# Patient Record
Sex: FEMALE | Race: NATIVE HAWAIIAN OR OTHER PACIFIC ISLANDER | NOT HISPANIC OR LATINO | Employment: FULL TIME | URBAN - METROPOLITAN AREA
[De-identification: names, ages, dates, MRNs, and addresses within clinical notes are randomized per-mention and may not be internally consistent; named-entity substitution may affect disease eponyms.]

---

## 2018-01-01 ENCOUNTER — TRANSFERRED RECORDS (OUTPATIENT)
Dept: HEALTH INFORMATION MANAGEMENT | Facility: CLINIC | Age: 33
End: 2018-01-01

## 2018-01-01 LAB — PAP SMEAR - HIM PATIENT REPORTED: NEGATIVE

## 2020-11-05 ASSESSMENT — ENCOUNTER SYMPTOMS
DIARRHEA: 0
PALPITATIONS: 0
JOINT SWELLING: 0
DYSURIA: 0
HEARTBURN: 0
FEVER: 0
CONSTIPATION: 0
FREQUENCY: 0
COUGH: 0
HEADACHES: 0
DIZZINESS: 0
NERVOUS/ANXIOUS: 1
CHILLS: 0
HEMATOCHEZIA: 0
NAUSEA: 0
SHORTNESS OF BREATH: 0
PARESTHESIAS: 0
SORE THROAT: 0
ABDOMINAL PAIN: 0
EYE PAIN: 0
HEMATURIA: 0
BREAST MASS: 0
ARTHRALGIAS: 0
MYALGIAS: 0

## 2020-11-06 ENCOUNTER — OFFICE VISIT (OUTPATIENT)
Dept: FAMILY MEDICINE | Facility: CLINIC | Age: 35
End: 2020-11-06
Payer: COMMERCIAL

## 2020-11-06 VITALS
HEIGHT: 66 IN | OXYGEN SATURATION: 99 % | BODY MASS INDEX: 38.06 KG/M2 | SYSTOLIC BLOOD PRESSURE: 128 MMHG | WEIGHT: 236.8 LBS | HEART RATE: 64 BPM | RESPIRATION RATE: 16 BRPM | TEMPERATURE: 98.2 F | DIASTOLIC BLOOD PRESSURE: 76 MMHG

## 2020-11-06 DIAGNOSIS — Z13.0 SCREENING, ANEMIA, DEFICIENCY, IRON: ICD-10-CM

## 2020-11-06 DIAGNOSIS — Z00.00 ROUTINE GENERAL MEDICAL EXAMINATION AT A HEALTH CARE FACILITY: Primary | ICD-10-CM

## 2020-11-06 DIAGNOSIS — Z13.1 SCREENING FOR DIABETES MELLITUS: ICD-10-CM

## 2020-11-06 DIAGNOSIS — Z11.4 SCREENING FOR HIV (HUMAN IMMUNODEFICIENCY VIRUS): ICD-10-CM

## 2020-11-06 DIAGNOSIS — Z13.29 SCREENING FOR THYROID DISORDER: ICD-10-CM

## 2020-11-06 DIAGNOSIS — Z11.59 ENCOUNTER FOR HEPATITIS C SCREENING TEST FOR LOW RISK PATIENT: ICD-10-CM

## 2020-11-06 DIAGNOSIS — Z13.21 ENCOUNTER FOR VITAMIN DEFICIENCY SCREENING: ICD-10-CM

## 2020-11-06 DIAGNOSIS — Z13.6 CARDIOVASCULAR SCREENING; LDL GOAL LESS THAN 160: ICD-10-CM

## 2020-11-06 LAB
ALBUMIN SERPL-MCNC: 3.6 G/DL (ref 3.4–5)
ALP SERPL-CCNC: 62 U/L (ref 40–150)
ALT SERPL W P-5'-P-CCNC: 20 U/L (ref 0–50)
ANION GAP SERPL CALCULATED.3IONS-SCNC: 3 MMOL/L (ref 3–14)
AST SERPL W P-5'-P-CCNC: 15 U/L (ref 0–45)
BASOPHILS # BLD AUTO: 0 10E9/L (ref 0–0.2)
BASOPHILS NFR BLD AUTO: 0.4 %
BILIRUB SERPL-MCNC: 0.6 MG/DL (ref 0.2–1.3)
BUN SERPL-MCNC: 8 MG/DL (ref 7–30)
CALCIUM SERPL-MCNC: 9.4 MG/DL (ref 8.5–10.1)
CHLORIDE SERPL-SCNC: 105 MMOL/L (ref 94–109)
CHOLEST SERPL-MCNC: 169 MG/DL
CO2 SERPL-SCNC: 29 MMOL/L (ref 20–32)
CREAT SERPL-MCNC: 0.67 MG/DL (ref 0.52–1.04)
DIFFERENTIAL METHOD BLD: NORMAL
EOSINOPHIL # BLD AUTO: 0.2 10E9/L (ref 0–0.7)
EOSINOPHIL NFR BLD AUTO: 2.2 %
ERYTHROCYTE [DISTWIDTH] IN BLOOD BY AUTOMATED COUNT: 12.6 % (ref 10–15)
FERRITIN SERPL-MCNC: 19 NG/ML (ref 12–150)
GFR SERPL CREATININE-BSD FRML MDRD: >90 ML/MIN/{1.73_M2}
GLUCOSE SERPL-MCNC: 83 MG/DL (ref 70–99)
HCT VFR BLD AUTO: 42.4 % (ref 35–47)
HDLC SERPL-MCNC: 38 MG/DL
HGB BLD-MCNC: 13.7 G/DL (ref 11.7–15.7)
LDLC SERPL CALC-MCNC: 125 MG/DL
LYMPHOCYTES # BLD AUTO: 2 10E9/L (ref 0.8–5.3)
LYMPHOCYTES NFR BLD AUTO: 28.7 %
MCH RBC QN AUTO: 30.6 PG (ref 26.5–33)
MCHC RBC AUTO-ENTMCNC: 32.3 G/DL (ref 31.5–36.5)
MCV RBC AUTO: 95 FL (ref 78–100)
MONOCYTES # BLD AUTO: 0.6 10E9/L (ref 0–1.3)
MONOCYTES NFR BLD AUTO: 7.9 %
NEUTROPHILS # BLD AUTO: 4.2 10E9/L (ref 1.6–8.3)
NEUTROPHILS NFR BLD AUTO: 60.8 %
NONHDLC SERPL-MCNC: 131 MG/DL
PLATELET # BLD AUTO: 267 10E9/L (ref 150–450)
POTASSIUM SERPL-SCNC: 4.1 MMOL/L (ref 3.4–5.3)
PROT SERPL-MCNC: 7.5 G/DL (ref 6.8–8.8)
RBC # BLD AUTO: 4.48 10E12/L (ref 3.8–5.2)
SODIUM SERPL-SCNC: 137 MMOL/L (ref 133–144)
TRIGL SERPL-MCNC: 30 MG/DL
TSH SERPL DL<=0.005 MIU/L-ACNC: 2.11 MU/L (ref 0.4–4)
WBC # BLD AUTO: 6.9 10E9/L (ref 4–11)

## 2020-11-06 PROCEDURE — 36415 COLL VENOUS BLD VENIPUNCTURE: CPT | Performed by: PHYSICIAN ASSISTANT

## 2020-11-06 PROCEDURE — 99385 PREV VISIT NEW AGE 18-39: CPT | Performed by: PHYSICIAN ASSISTANT

## 2020-11-06 PROCEDURE — 82306 VITAMIN D 25 HYDROXY: CPT | Performed by: PHYSICIAN ASSISTANT

## 2020-11-06 PROCEDURE — 80061 LIPID PANEL: CPT | Performed by: PHYSICIAN ASSISTANT

## 2020-11-06 PROCEDURE — 87389 HIV-1 AG W/HIV-1&-2 AB AG IA: CPT | Performed by: PHYSICIAN ASSISTANT

## 2020-11-06 PROCEDURE — 86803 HEPATITIS C AB TEST: CPT | Performed by: PHYSICIAN ASSISTANT

## 2020-11-06 PROCEDURE — 82728 ASSAY OF FERRITIN: CPT | Performed by: PHYSICIAN ASSISTANT

## 2020-11-06 PROCEDURE — 80050 GENERAL HEALTH PANEL: CPT | Performed by: PHYSICIAN ASSISTANT

## 2020-11-06 ASSESSMENT — ENCOUNTER SYMPTOMS
FREQUENCY: 0
DIZZINESS: 0
HEARTBURN: 0
JOINT SWELLING: 0
BREAST MASS: 0
NAUSEA: 0
ARTHRALGIAS: 0
DIARRHEA: 0
EYE PAIN: 0
DYSURIA: 0
CONSTIPATION: 0
FEVER: 0
SHORTNESS OF BREATH: 0
COUGH: 0
ABDOMINAL PAIN: 0
HEMATOCHEZIA: 0
HEMATURIA: 0
MYALGIAS: 0
NERVOUS/ANXIOUS: 1
CHILLS: 0
PALPITATIONS: 0
HEADACHES: 0
SORE THROAT: 0
PARESTHESIAS: 0

## 2020-11-06 ASSESSMENT — MIFFLIN-ST. JEOR: SCORE: 1772.93

## 2020-11-06 NOTE — Clinical Note
Pap smear done ~ 2 years ago, within normal limits and no history of abnormals.  Previously lived in [facility] and will get medical records from there when possible.

## 2020-11-06 NOTE — PROGRESS NOTES
SUBJECTIVE:   CC: Durga Flannery is an 35 year old woman who presents for preventive health visit.       Patient has been advised of split billing requirements and indicates understanding: Yes  Healthy Habits:     Getting at least 3 servings of Calcium per day:  Yes    Bi-annual eye exam:  NO    Dental care twice a year:  Yes    Sleep apnea or symptoms of sleep apnea:  Excessive snoring    Diet:  Carbohydrate counting    Frequency of exercise:  6-7 days/week    Duration of exercise:  45-60 minutes    Taking medications regularly:  Yes    Medication side effects:  None    PHQ-2 Total Score: 2    Additional concerns today:  Yes    Pap smear done ~ 2 years ago, within normal limits and no history of abnormals.  Previously lived in [facility] and will get medical records from there when possible.      Today's PHQ-2 Score:   PHQ-2 ( 1999 [employer]) 11/5/2020   Q1: Little interest or pleasure in doing things 1   Q2: Feeling down, depressed or hopeless 1   PHQ-2 Score 2   Q1: Little interest or pleasure in doing things Several days   Q2: Feeling down, depressed or hopeless Several days   PHQ-2 Score 2       Abuse: Current or Past (Physical, Sexual or Emotional) - No  Do you feel safe in your environment? Yes        Social History     Tobacco Use     Smoking status: Never Smoker     Smokeless tobacco: Never Used   Substance Use Topics     Alcohol use: Yes     Comment: 3/ week         Alcohol Use 11/5/2020   Prescreen: >3 drinks/day or >7 drinks/week? No       Reviewed orders with patient.  Reviewed health maintenance and updated orders accordingly - Yes  Labs reviewed in EPIC  BP Readings from Last 3 Encounters:   11/06/20 128/76    Wt Readings from Last 3 Encounters:   11/06/20 107.4 kg (236 lb 12.8 oz)                  There is no problem list on file for this patient.    History reviewed. No pertinent surgical history.    Social History     Tobacco Use     Smoking status: Never Smoker     Smokeless tobacco: Never Used    Substance Use Topics     Alcohol use: Yes     Comment: 3/ week     History reviewed. No pertinent family history.      Current Outpatient Medications   Medication Sig Dispense Refill     ferrous fumarate 65 mg, [place]. FE,-Vitamin C 125 mg (VITRON C)  MG TABS tablet Take 1 tablet by mouth daily       Multiple Vitamins-Minerals (HAIR SKIN AND NAILS FORMULA PO)        Nutritional Supplements (VITAMIN D BOOSTER PO)        No Known Allergies  No lab results found.     Mammogram not appropriate for this patient based on age.    Pertinent mammograms are reviewed under the imaging tab.  History of abnormal Pap smear: NO - age 30- 65 PAP every 3 years recommended     Reviewed and updated as needed this visit by clinical staff  Tobacco  Allergies  Meds  Problems  Med Hx  Surg Hx  Fam Hx  Soc Hx          Reviewed and updated as needed this visit by Provider  Tobacco  Allergies  Meds  Problems  Med Hx  Surg Hx  Fam Hx             Review of Systems   Constitutional: Negative for chills and fever.   HENT: Negative for congestion, ear pain, hearing loss and sore throat.    Eyes: Negative for pain.   Respiratory: Negative for cough and shortness of breath.    Cardiovascular: Negative for chest pain, palpitations and peripheral edema.   Gastrointestinal: Negative for abdominal pain, constipation, diarrhea, heartburn, hematochezia and nausea.   Breasts:  Negative for tenderness, breast mass and discharge.   Genitourinary: Negative for dysuria, frequency, genital sores, hematuria, pelvic pain, urgency, vaginal bleeding and vaginal discharge.   Musculoskeletal: Negative for arthralgias, joint swelling and myalgias.   Skin: Negative for rash.   Neurological: Negative for dizziness, headaches and paresthesias.   Psychiatric/Behavioral: Negative for mood changes. The patient is nervous/anxious.         OBJECTIVE:   /76 (BP Location: Right arm, Patient Position: Chair, Cuff Size: Adult Large)   Pulse 64   Temp  "98.2  F (36.8  C) (Oral)   Resp 16   Ht 1.664 m (5' 5.5\")   Wt 107.4 kg (236 lb 12.8 oz)   LMP 10/13/2020 (Exact Date)   SpO2 99%   Breastfeeding No   BMI 38.81 kg/m    Physical Exam  GENERAL: healthy, alert and no distress  EYES: Eyes grossly normal to inspection, PERRL and conjunctivae and sclerae normal  HENT: ear canals and TM's normal, nose and mouth without ulcers or lesions  NECK: no adenopathy, no asymmetry, masses, or scars and thyroid normal to palpation  RESP: lungs clear to auscultation - no rales, rhonchi or wheezes  BREAST: normal without masses, tenderness or nipple discharge and no palpable axillary masses or adenopathy  CV: regular rate and rhythm, normal S1 S2, no S3 or S4, no murmur, click or rub, no peripheral edema and peripheral pulses strong  ABDOMEN: soft, nontender, no hepatosplenomegaly, no masses and bowel sounds normal  MS: no gross musculoskeletal defects noted, no edema  SKIN: no suspicious lesions or rashes  NEURO: Normal strength and tone, mentation intact and speech normal  PSYCH: mentation appears normal, affect normal/bright    Diagnostic Test Results:  Labs reviewed in Epic    ASSESSMENT/PLAN:       ICD-10-CM    1. Routine general medical examination at a health care facility  Z00.00    2. Screening, anemia, deficiency, iron  Z13.0 Ferritin     CBC with platelets differential   3. Screening for diabetes mellitus  Z13.1 Comprehensive metabolic panel   4. Screening for thyroid disorder  Z13.29 TSH with free T4 reflex   5. Encounter for vitamin deficiency screening  Z13.21 Vitamin D Deficiency   6. Encounter for hepatitis C screening test for low risk patient  Z11.59 Hepatitis C Screen Reflex to HCV RNA Quant and Genotype   7. Screening for HIV (human immunodeficiency virus)  Z11.4 HIV Antigen Antibody Combo   8. CARDIOVASCULAR SCREENING; LDL GOAL LESS THAN 160  Z13.6 Lipid panel reflex to direct LDL Fasting       Patient has been advised of split billing requirements and " "indicates understanding: Yes  COUNSELING:  Reviewed preventive health counseling, as reflected in patient instructions       Regular exercise       Healthy diet/nutrition    Estimated body mass index is 38.81 kg/m  as calculated from the following:    Height as of this encounter: 1.664 m (5' 5.5\").    Weight as of this encounter: 107.4 kg (236 lb 12.8 oz).    Weight management plan: Discussed healthy diet and exercise guidelines    She reports that she has never smoked. She has never used smokeless tobacco.      Counseling Resources:  ATP IV Guidelines  Pooled Cohorts Equation Calculator  Breast Cancer Risk Calculator  BRCA-Related Cancer Risk Assessment: FHS-7 Tool  FRAX Risk Assessment  ICSI Preventive Guidelines  Dietary Guidelines for Americans, 2010  USDA's MyPlate  ASA Prophylaxis  Lung CA Screening    [facility]  "

## 2020-11-09 LAB
DEPRECATED CALCIDIOL+CALCIFEROL SERPL-MC: 46 UG/L (ref 20–75)
HCV AB SERPL QL IA: NONREACTIVE
HIV 1+2 AB+HIV1 P24 AG SERPL QL IA: NONREACTIVE

## 2020-11-09 NOTE — RESULT ENCOUNTER NOTE
"Paulina Calixto  Your attached labs are overall within normal limits, but continue to work on healthy diet and exercise for your cholesterol levels.  Please contact the office with any questions or concerns.    Melisa Zavaleta \"Luís\" CECI Garcia    "

## 2021-01-29 ENCOUNTER — OFFICE VISIT (OUTPATIENT)
Dept: FAMILY MEDICINE | Facility: CLINIC | Age: 36
End: 2021-01-29
Payer: COMMERCIAL

## 2021-01-29 VITALS
HEART RATE: 80 BPM | WEIGHT: 237 LBS | OXYGEN SATURATION: 99 % | HEIGHT: 66 IN | SYSTOLIC BLOOD PRESSURE: 133 MMHG | BODY MASS INDEX: 38.09 KG/M2 | DIASTOLIC BLOOD PRESSURE: 85 MMHG | TEMPERATURE: 98.5 F

## 2021-01-29 DIAGNOSIS — N30.01 ACUTE CYSTITIS WITH HEMATURIA: Primary | ICD-10-CM

## 2021-01-29 DIAGNOSIS — Z30.011 ENCOUNTER FOR INITIAL PRESCRIPTION OF CONTRACEPTIVE PILLS: ICD-10-CM

## 2021-01-29 DIAGNOSIS — R39.9 URINARY SYMPTOM OR SIGN: ICD-10-CM

## 2021-01-29 DIAGNOSIS — Z11.3 SCREEN FOR STD (SEXUALLY TRANSMITTED DISEASE): ICD-10-CM

## 2021-01-29 LAB
ALBUMIN UR-MCNC: 30 MG/DL
APPEARANCE UR: CLEAR
BILIRUB UR QL STRIP: ABNORMAL
COLOR UR AUTO: YELLOW
GLUCOSE UR STRIP-MCNC: NEGATIVE MG/DL
HGB UR QL STRIP: ABNORMAL
KETONES UR STRIP-MCNC: 15 MG/DL
LEUKOCYTE ESTERASE UR QL STRIP: ABNORMAL
NITRATE UR QL: POSITIVE
PH UR STRIP: 5.5 PH (ref 5–7)
RBC #/AREA URNS AUTO: ABNORMAL /HPF
SOURCE: ABNORMAL
SP GR UR STRIP: >1.03 (ref 1–1.03)
UROBILINOGEN UR STRIP-ACNC: 1 EU/DL (ref 0.2–1)
WBC #/AREA URNS AUTO: ABNORMAL /HPF

## 2021-01-29 PROCEDURE — 87591 N.GONORRHOEAE DNA AMP PROB: CPT | Performed by: PHYSICIAN ASSISTANT

## 2021-01-29 PROCEDURE — 99214 OFFICE O/P EST MOD 30 MIN: CPT | Performed by: PHYSICIAN ASSISTANT

## 2021-01-29 PROCEDURE — 87491 CHLMYD TRACH DNA AMP PROBE: CPT | Performed by: PHYSICIAN ASSISTANT

## 2021-01-29 PROCEDURE — 87086 URINE CULTURE/COLONY COUNT: CPT | Performed by: PHYSICIAN ASSISTANT

## 2021-01-29 PROCEDURE — 81001 URINALYSIS AUTO W/SCOPE: CPT | Performed by: PHYSICIAN ASSISTANT

## 2021-01-29 RX ORDER — NITROFURANTOIN 25; 75 MG/1; MG/1
100 CAPSULE ORAL 2 TIMES DAILY
Qty: 14 CAPSULE | Refills: 0 | Status: SHIPPED | OUTPATIENT
Start: 2021-01-29 | End: 2021-02-05

## 2021-01-29 RX ORDER — NORETHINDRONE ACETATE AND ETHINYL ESTRADIOL 1MG-20(21)
1 KIT ORAL DAILY
Qty: 84 TABLET | Refills: 3 | Status: SHIPPED | OUTPATIENT
Start: 2021-01-29 | End: 2021-09-07

## 2021-01-29 ASSESSMENT — MIFFLIN-ST. JEOR: SCORE: 1773.83

## 2021-01-29 NOTE — PATIENT INSTRUCTIONS
Encouraged patient to push fluids, may use Pyridium (urlistat/azo) and/or cranberry juice/pills OTC as directed. Discussed importance of good personal hygiene and frequent urination.  Prescription for oral antibiotic sent to pharmacy.  Call or return to clinic prn if these symptoms worsen or fail to improve as anticipated.     Risks and benefits of various birth control options were discussed.  -No family hx or personal hx of breast cancer, clots, DVTs, PEs.    -Discussed options including IUD, oral contraception, nuvaring, patch, condoms, natural family planning.  Patient elects low dose oral contraceptive pill.  Side effects discussed.  Discussed how to use, what to do if misses a dose, when to start.  Discussed importance of safe sex practices and condom use with patient.  Follow up if any questions or concerns.        We are working hard to begin vaccinating more people against COVID-19. Currently, we are only vaccinating Phase 1a workers - healthcare workers who are unable to do their job remotely. Vaccine availability is very limited.      If you are a healthcare worker and you are unable to do your job remotely, please log in to [employer] using this link to see if we have openings and schedule an appointment. At your vaccine appointment, you will be asked to provide proof of employment as a health care worker. If you cannot, you will be turned away.     Vaccine appointments are being added as they become available. Please check your [employer] account frequently for availability.  If you have technical difficulty using [employer], call [phone] for assistance.     You can learn more about the state's phased approach to administering the vaccine, with details on each phase, here.      Phase 1b is the next group that will get vaccinated and includes frontline essential workers and adults 75 years of age and older. When we are able to start vaccinating this group, we will share that information on our website. Check  this website to stay up to date on COVID-19 vaccination information.        Did you know?      You can schedule a video visit for follow-up appointments as well as future appointments for certain conditions.  Please see the below link.     [email or web address]    If you have not already done so,  I encourage you to sign up for [employer] ([email or web address]).  This will allow you to review your results, securely communicate with a provider, and schedule virtual visits as well.

## 2021-01-29 NOTE — PROGRESS NOTES
Assessment & Plan     Acute cystitis with hematuria; Urinary symptom or sign  Urinalysis abnormal, prescription for oral antibiotic sent to pharmacy; urine culture pending; see patient instructions as well.  - nitroFURantoin macrocrystal-monohydrate (MACROBID) 100 MG capsule; Take 1 capsule (100 mg) by mouth 2 times daily for 7 days  - *UA reflex to Microscopic and Culture ([place] and [facility] (except [place] and [place])  - Urine Culture Aerobic Bacterial  - Urine Microscopic    Encounter for initial prescription of contraceptive pills  Risks and benefits of various birth control options were discussed.  -No family hx or personal hx of breast cancer, clots, DVTs, PEs.    -Discussed options including IUD, oral contraception, nuvaring, patch, condoms, natural family planning.  Patient elects low dose oral contraceptive pill.  Side effects discussed.  Discussed how to use, what to do if misses a dose, when to start.  Discussed importance of safe sex practices and condom use with patient.  Follow up if any questions or concerns.  - norethindrone-ethinyl estradiol (JUNEL FE 1/20) 1-20 MG-MCG tablet; Take 1 tablet by mouth daily    Screen for STD (sexually transmitted disease)  No current symptoms, screening  - Neisseria gonorrhoeae PCR  - Chlamydia trachomatis PCR  Review of the result(s) of each unique test - pap semar in [facility] ~ 2 years ago, will get exact dates      20 minutes spent on the date of the encounter doing chart review, history and exam, documentation and further activities as noted above           Patient Instructions   Encouraged patient to push fluids, may use Pyridium (urlistat/azo) and/or cranberry juice/pills OTC as directed. Discussed importance of good personal hygiene and frequent urination.  Prescription for oral antibiotic sent to pharmacy.  Call or return to clinic prn if these symptoms worsen or fail to improve as anticipated.     Risks and benefits of various birth control options  were discussed.  -No family hx or personal hx of breast cancer, clots, DVTs, PEs.    -Discussed options including IUD, oral contraception, nuvaring, patch, condoms, natural family planning.  Patient elects low dose oral contraceptive pill.  Side effects discussed.  Discussed how to use, what to do if misses a dose, when to start.  Discussed importance of safe sex practices and condom use with patient.  Follow up if any questions or concerns.        We are working hard to begin vaccinating more people against COVID-19. Currently, we are only vaccinating Phase 1a workers - healthcare workers who are unable to do their job remotely. Vaccine availability is very limited.      If you are a healthcare worker and you are unable to do your job remotely, please log in to [employer] using this link to see if we have openings and schedule an appointment. At your vaccine appointment, you will be asked to provide proof of employment as a health care worker. If you cannot, you will be turned away.     Vaccine appointments are being added as they become available. Please check your [employer] account frequently for availability.  If you have technical difficulty using [employer], call [phone] for assistance.     You can learn more about the state's phased approach to administering the vaccine, with details on each phase, here.      Phase 1b is the next group that will get vaccinated and includes frontline essential workers and adults 75 years of age and older. When we are able to start vaccinating this group, we will share that information on our website. Check this website to stay up to date on COVID-19 vaccination information.        Did you know?      You can schedule a video visit for follow-up appointments as well as future appointments for certain conditions.  Please see the below link.     [email or web address]    If you have not already done so,  I encourage you to sign up for [employer]  "([email or web address]).  This will allow you to review your results, securely communicate with a provider, and schedule virtual visits as well.      Return in about 6 months (around 7/29/2021) for Routine pap smear, or sooner with worsening symptoms.    [facility] is a 35 year old who presents to clinic today for the following health issues     HPI       Genitourinary - Female  Onset/Duration: 3 days  Description:   Painful urination (Dysuria): YES           Frequency: YES  Blood in urine (Hematuria): YES  Delay in urine (Hesitency): YES  Intensity: mild  Progression of Symptoms:  same  Accompanying Signs & Symptoms:  Fever/chills: no  Flank pain: no  Nausea and vomiting: no  Vaginal symptoms: none  Abdominal/Pelvic Pain: no  History:   History of frequent UTI s: no  History of kidney stones: no  Sexually Active: YES  Possibility of pregnancy: No  Precipitating or alleviating factors: None  Therapies tried and outcome: Cranberry juice prn    Patient also interested in started oral contraceptive pill - previously on orthotricyclin in the past with no issues and good results.    Patient also wondering if she is a candidate for Gardasil9 vaccine.    Review of Systems   Constitutional, HEENT, cardiovascular, pulmonary, GI, , musculoskeletal, neuro, skin, endocrine and psych systems are negative, except as otherwise noted.      Objective    /85 (BP Location: Left arm, Cuff Size: Adult Large)   Pulse 80   Temp 98.5  F (36.9  C) (Tympanic)   Ht 1.664 m (5' 5.5\")   Wt 107.5 kg (237 lb)   LMP 01/04/2021   SpO2 99%   BMI 38.84 kg/m    Body mass index is 38.84 kg/m .  Physical Exam   GENERAL: healthy, alert and no distress  RESP: lungs clear to auscultation - no rales, rhonchi or wheezes  CV: regular rate and rhythm, normal S1 S2, no S3 or S4, no murmur, click or rub, no peripheral edema and peripheral pulses " strong  ABDOMEN: soft, nontender, no hepatosplenomegaly, no masses and bowel sounds normal  BACK: no paralumbar tenderness  PSYCH: mentation appears normal, affect normal/bright    Results for orders placed or performed in visit on 01/29/21 (from the past 24 hour(s))   *UA reflex to Microscopic and Culture ([place] and [facility] (except [place] and [place])    Specimen: Midstream Urine   Result Value Ref Range    Color Urine Yellow     Appearance Urine Clear     Glucose Urine Negative NEG^Negative mg/dL    Bilirubin Urine Small (A) NEG^Negative    Ketones Urine 15 (A) NEG^Negative mg/dL    Specific Gravity Urine >1.030 1.003 - 1.035    Blood Urine Large (A) NEG^Negative    pH Urine 5.5 5.0 - 7.0 pH    Protein Albumin Urine 30 (A) NEG^Negative mg/dL    Urobilinogen Urine 1.0 0.2 - 1.0 EU/dL    Nitrite Urine Positive (A) NEG^Negative    Leukocyte Esterase Urine Moderate (A) NEG^Negative    Source Midstream Urine    Urine Microscopic   Result Value Ref Range    WBC Urine Quantity not sufficient (A) OTO5^0 - 5 /HPF    RBC Urine Quantity not sufficient (A) OTO2^O - 2 /HPF

## 2021-01-29 NOTE — NURSING NOTE
"Chief Complaint   Patient presents with     Urinary Problem     Imm/Inj     initial /85 (BP Location: Left arm, Cuff Size: Adult Large)   Pulse 80   Temp 98.5  F (36.9  C) (Tympanic)   Ht 1.664 m (5' 5.5\")   Wt 107.5 kg (237 lb)   LMP 01/04/2021   SpO2 99%   BMI 38.84 kg/m   Estimated body mass index is 38.84 kg/m  as calculated from the following:    Height as of this encounter: 1.664 m (5' 5.5\").    Weight as of this encounter: 107.5 kg (237 lb).  BP completed using cuff size: large.  L  arm      Health Maintenance that is potentially due pending provider review:  NONE    n/a    Jared Suh ma  "

## 2021-01-30 LAB
BACTERIA SPEC CULT: NORMAL
SPECIMEN SOURCE: NORMAL

## 2021-02-02 LAB
C TRACH DNA SPEC QL NAA+PROBE: NEGATIVE
N GONORRHOEA DNA SPEC QL NAA+PROBE: NEGATIVE
SPECIMEN SOURCE: NORMAL
SPECIMEN SOURCE: NORMAL

## 2021-02-02 NOTE — RESULT ENCOUNTER NOTE
"Paulina Calixto  Your attached labs are negative for gonorrhea and chlamydia.  Please contact the office with any questions or concerns.    Melisa Zavaleta \"Luís\" CECI Garcia    "

## 2021-03-07 ENCOUNTER — HEALTH MAINTENANCE LETTER (OUTPATIENT)
Age: 36
End: 2021-03-07

## 2021-03-16 ENCOUNTER — OFFICE VISIT (OUTPATIENT)
Dept: FAMILY MEDICINE | Facility: CLINIC | Age: 36
End: 2021-03-16
Payer: COMMERCIAL

## 2021-03-16 VITALS
DIASTOLIC BLOOD PRESSURE: 88 MMHG | HEART RATE: 77 BPM | TEMPERATURE: 98.6 F | WEIGHT: 240 LBS | OXYGEN SATURATION: 100 % | BODY MASS INDEX: 38.57 KG/M2 | RESPIRATION RATE: 16 BRPM | HEIGHT: 66 IN | SYSTOLIC BLOOD PRESSURE: 127 MMHG

## 2021-03-16 DIAGNOSIS — Z01.419 ENCOUNTER FOR GYNECOLOGICAL EXAMINATION WITHOUT ABNORMAL FINDING: Primary | ICD-10-CM

## 2021-03-16 DIAGNOSIS — Z11.3 SCREEN FOR STD (SEXUALLY TRANSMITTED DISEASE): ICD-10-CM

## 2021-03-16 PROCEDURE — 99213 OFFICE O/P EST LOW 20 MIN: CPT | Performed by: PHYSICIAN ASSISTANT

## 2021-03-16 PROCEDURE — 87624 HPV HI-RISK TYP POOLED RSLT: CPT | Performed by: PHYSICIAN ASSISTANT

## 2021-03-16 PROCEDURE — G0145 SCR C/V CYTO,THINLAYER,RESCR: HCPCS | Performed by: PHYSICIAN ASSISTANT

## 2021-03-16 PROCEDURE — 99000 SPECIMEN HANDLING OFFICE-LAB: CPT | Performed by: PHYSICIAN ASSISTANT

## 2021-03-16 PROCEDURE — 36415 COLL VENOUS BLD VENIPUNCTURE: CPT | Performed by: PHYSICIAN ASSISTANT

## 2021-03-16 PROCEDURE — 87591 N.GONORRHOEAE DNA AMP PROB: CPT | Performed by: PHYSICIAN ASSISTANT

## 2021-03-16 PROCEDURE — 86780 TREPONEMA PALLIDUM: CPT | Mod: 90 | Performed by: PHYSICIAN ASSISTANT

## 2021-03-16 PROCEDURE — 87389 HIV-1 AG W/HIV-1&-2 AB AG IA: CPT | Performed by: PHYSICIAN ASSISTANT

## 2021-03-16 PROCEDURE — 87491 CHLMYD TRACH DNA AMP PROBE: CPT | Performed by: PHYSICIAN ASSISTANT

## 2021-03-16 ASSESSMENT — MIFFLIN-ST. JEOR: SCORE: 1782.44

## 2021-03-16 NOTE — PROGRESS NOTES
Assessment & Plan     Encounter for gynecological examination without abnormal finding  - Pap imaged thin layer screen with HPV - recommended age 30 - 65 years (select HPV order below)  - HPV High Risk Types DNA Cervical    Screen for STD (sexually transmitted disease)  - Neisseria gonorrhoeae PCR  - Chlamydia trachomatis PCR  - Treponema Abs w Reflex to RPR and Titer  - HIV Antigen Antibody Combo    Review of prior external note(s) from - previous PE with labs done at that time for biometric form  20 minutes spent on the date of the encounter doing chart review, history and exam, documentation and further activities as noted above       Patient Instructions     Preventive Health Recommendations  Female Ages 26 - 39  Yearly exam:   See your health care provider every year in order to    Review health changes.     Discuss preventive care.      Review your medicines if you your doctor has prescribed any.    Until age 30: Get a Pap test every three years (more often if you have had an abnormal result).    After age 30: Talk to your doctor about whether you should have a Pap test every 3 years or have a Pap test with HPV screening every 5 years.   You do not need a Pap test if your uterus was removed (hysterectomy) and you have not had cancer.  You should be tested each year for STDs (sexually transmitted diseases), if you're at risk.   Talk to your provider about how often to have your cholesterol checked.  If you are at risk for diabetes, you should have a diabetes test (fasting glucose).  Shots: Get a flu shot each year. Get a tetanus shot every 10 years.   Nutrition:     Eat at least 5 servings of fruits and vegetables each day.    Eat whole-grain bread, whole-wheat pasta and brown rice instead of white grains and rice.    Get adequate Calcium and Vitamin D.     Lifestyle    Exercise at least 150 minutes a week (30 minutes a day, 5 days of the week). This will help you control your weight and prevent  disease.    Limit alcohol to one drink per day.    No smoking.     Wear sunscreen to prevent skin cancer.    See your dentist every six months for an exam and cleaning.    Preventive Health Recommendations  Female Ages 26 - 39  Yearly exam:   See your health care provider every year in order to    Review health changes.     Discuss preventive care.      Review your medicines if you your doctor has prescribed any.    Until age 30: Get a Pap test every three years (more often if you have had an abnormal result).    After age 30: Talk to your doctor about whether you should have a Pap test every 3 years or have a Pap test with HPV screening every 5 years.   You do not need a Pap test if your uterus was removed (hysterectomy) and you have not had cancer.  You should be tested each year for STDs (sexually transmitted diseases), if you're at risk.   Talk to your provider about how often to have your cholesterol checked.  If you are at risk for diabetes, you should have a diabetes test (fasting glucose).  Shots: Get a flu shot each year. Get a tetanus shot every 10 years.   Nutrition:     Eat at least 5 servings of fruits and vegetables each day.    Eat whole-grain bread, whole-wheat pasta and brown rice instead of white grains and rice.    Get adequate Calcium and Vitamin D.     Lifestyle    Exercise at least 150 minutes a week (30 minutes a day, 5 days of the week). This will help you control your weight and prevent disease.    Limit alcohol to one drink per day.    No smoking.     Wear sunscreen to prevent skin cancer.    See your dentist every six months for an exam and cleaning.        Return in about 1 year (around 3/16/2022) for Routine Visit, or sooner with worsening symptoms.    Melisa Garcia PA-C  [facility]    Los Calixto is a 36 year old who presents for the following health issues   HPI     Pap and STD check  No current std symptoms but new partner and would like to be  "safe.  Interested in getting IUD in near future as well.  Biometric form completed from previous labs drawn end of 2020.        Review of Systems   Constitutional, HEENT, cardiovascular, pulmonary, GI, , musculoskeletal, neuro, skin, endocrine and psych systems are negative, except as otherwise noted.      Objective    /88 (BP Location: Left arm, Cuff Size: Adult Large)   Pulse 77   Temp 98.6  F (37  C) (Tympanic)   Resp 16   Ht 1.664 m (5' 5.5\")   Wt 108.9 kg (240 lb)   LMP 02/25/2021   SpO2 100%   BMI 39.33 kg/m    Body mass index is 39.33 kg/m .  Physical Exam   GENERAL: healthy, alert and no distress  RESP: lungs clear to auscultation - no rales, rhonchi or wheezes  CV: regular rate and rhythm, normal S1 S2, no S3 or S4, no murmur, click or rub, no peripheral edema and peripheral pulses strong  ABDOMEN: soft, nontender, no hepatosplenomegaly, no masses and bowel sounds normal   (female): normal female external genitalia, normal urethral meatus, vaginal mucosa, normal cervix/adnexa/uterus without masses or discharge; pap smear done today  PSYCH: mentation appears normal, affect normal/bright              "

## 2021-03-16 NOTE — NURSING NOTE
"Chief Complaint   Patient presents with     Physical     initial /88 (BP Location: Left arm, Cuff Size: Adult Large)   Pulse 77   Temp 98.6  F (37  C) (Tympanic)   Resp 16   Ht 1.664 m (5' 5.5\")   Wt 108.9 kg (240 lb)   LMP 02/25/2021   SpO2 100%   BMI 39.33 kg/m   Estimated body mass index is 39.33 kg/m  as calculated from the following:    Height as of this encounter: 1.664 m (5' 5.5\").    Weight as of this encounter: 108.9 kg (240 lb).  BP completed using cuff size: large.  L  R arm      Health Maintenance that is potentially due pending provider review:  NONE    PAP--Possibly completing today, per Provider review    Jared Suh ma  "

## 2021-03-17 LAB
HIV 1+2 AB+HIV1 P24 AG SERPL QL IA: NONREACTIVE
T PALLIDUM AB SER QL: NONREACTIVE

## 2021-03-18 NOTE — RESULT ENCOUNTER NOTE
"Paulina Calixto  Your attached labs are negative for STDs.  Please contact the office with any questions or concerns.    Melisa Zavaleta \"Luís\" CECI Garcia    "

## 2021-03-19 LAB
COPATH REPORT: NORMAL
PAP: NORMAL

## 2021-03-22 LAB
FINAL DIAGNOSIS: NORMAL
HPV HR 12 DNA CVX QL NAA+PROBE: NEGATIVE
HPV16 DNA SPEC QL NAA+PROBE: NEGATIVE
HPV18 DNA SPEC QL NAA+PROBE: NEGATIVE
SPECIMEN DESCRIPTION: NORMAL
SPECIMEN SOURCE CVX/VAG CYTO: NORMAL

## 2021-04-14 ENCOUNTER — OFFICE VISIT (OUTPATIENT)
Dept: NURSING | Facility: CLINIC | Age: 36
End: 2021-04-14
Payer: COMMERCIAL

## 2021-04-14 PROCEDURE — 91300 PR COVID VAC PFIZER DIL RECON 30 MCG/0.3 ML IM: CPT

## 2021-04-14 PROCEDURE — 0001A PR COVID VAC PFIZER DIL RECON 30 MCG/0.3 ML IM: CPT

## 2021-05-05 ENCOUNTER — IMMUNIZATION (OUTPATIENT)
Dept: NURSING | Facility: CLINIC | Age: 36
End: 2021-05-05
Attending: INTERNAL MEDICINE
Payer: COMMERCIAL

## 2021-05-05 PROCEDURE — 91300 PR COVID VAC PFIZER DIL RECON 30 MCG/0.3 ML IM: CPT

## 2021-05-05 PROCEDURE — 0002A PR COVID VAC PFIZER DIL RECON 30 MCG/0.3 ML IM: CPT

## 2021-07-13 ENCOUNTER — OFFICE VISIT (OUTPATIENT)
Dept: FAMILY MEDICINE | Facility: CLINIC | Age: 36
End: 2021-07-13
Payer: COMMERCIAL

## 2021-07-13 VITALS
BODY MASS INDEX: 39.6 KG/M2 | SYSTOLIC BLOOD PRESSURE: 134 MMHG | HEIGHT: 66 IN | RESPIRATION RATE: 22 BRPM | WEIGHT: 246.4 LBS | OXYGEN SATURATION: 100 % | HEART RATE: 67 BPM | DIASTOLIC BLOOD PRESSURE: 81 MMHG | TEMPERATURE: 98 F

## 2021-07-13 DIAGNOSIS — Z11.3 SCREEN FOR STD (SEXUALLY TRANSMITTED DISEASE): Primary | ICD-10-CM

## 2021-07-13 LAB
CLUE CELLS: PRESENT
TRICHOMONAS, WET PREP: ABNORMAL
WBC'S/HIGH POWER FIELD, WET PREP: ABNORMAL
YEAST, WET PREP: ABNORMAL

## 2021-07-13 PROCEDURE — 87591 N.GONORRHOEAE DNA AMP PROB: CPT | Performed by: FAMILY MEDICINE

## 2021-07-13 PROCEDURE — 87389 HIV-1 AG W/HIV-1&-2 AB AG IA: CPT | Performed by: FAMILY MEDICINE

## 2021-07-13 PROCEDURE — 36415 COLL VENOUS BLD VENIPUNCTURE: CPT | Performed by: FAMILY MEDICINE

## 2021-07-13 PROCEDURE — 87210 SMEAR WET MOUNT SALINE/INK: CPT | Performed by: FAMILY MEDICINE

## 2021-07-13 PROCEDURE — 99213 OFFICE O/P EST LOW 20 MIN: CPT | Performed by: FAMILY MEDICINE

## 2021-07-13 PROCEDURE — 87491 CHLMYD TRACH DNA AMP PROBE: CPT | Performed by: FAMILY MEDICINE

## 2021-07-13 ASSESSMENT — MIFFLIN-ST. JEOR: SCORE: 1811.47

## 2021-07-13 NOTE — PROGRESS NOTES
"        Los Calixto is a 36 year old who presents for the following health issues     HPI     Pt here for STD screening, new partner recently - no acute sx's or known exposure.     reports being sexually active and has had partner(s) who are Male. She reports using the following methods of birth control/protection: Pull-out method and Pill.    Current Outpatient Medications   Medication     ferrous fumarate 65 mg, [place]. FE,-Vitamin C 125 mg (VITRON C)  MG TABS tablet     Multiple Vitamins-Minerals (HAIR SKIN AND NAILS FORMULA PO)     Nutritional Supplements (VITAMIN D BOOSTER PO)     norethindrone-ethinyl estradiol (JUNEL FE 1/20) 1-20 MG-MCG tablet     No current facility-administered medications for this visit.     I have reviewed the patient's medical history in detail; there are no changes to the history as noted in EpicCare.    ROS: As per HPI.  Constitutional: no fevers/sweats/chills, no weight changes  GI: no nausea/vomiting/diarrhea, normal stooling  GYN: no Vaginal discharge    EXAM  /81 (Patient Position: Sitting, Cuff Size: Adult Large)   Pulse 67   Temp 98  F (36.7  C) (Tympanic)   Resp 22   Ht 1.664 m (5' 5.5\")   Wt 111.8 kg (246 lb 6.4 oz)   LMP 07/13/2021 (Exact Date)   SpO2 100%   BMI 40.38 kg/m    Gen: Healthy appearing female in no apparent distress  :vaginal exam defered    Results for orders placed or performed in visit on 07/13/21 (from the past 24 hour(s))   Wet prep - Clinic Collect    Specimen: Vagina; Swab   Result Value Ref Range    Trichomonas Absent Absent    Yeast Absent Absent    Clue Cells Present (A) Absent    WBCs/high power field 1+ (A) None       ASSESSMENT/PLAN:  Assessment & Plan     Screen for STD (sexually transmitted disease)    Will Follow up pending lab tests with patient by mychart or phone      - Wet prep - Clinic Collect  - HIV Antigen Antibody Combo; Future  - NEISSERIA GONORRHOEA PCR; Future  - CHLAMYDIA TRACHOMATIS PCR; Future  - " NEISSERIA GONORRHOEA PCR  - CHLAMYDIA TRACHOMATIS PCR  - HIV Antigen Antibody Combo    No follow-ups on file.    Walt Thompson MD  [facility]    See instructions    Will Follow up pending lab tests with patient by mychart or phone

## 2021-07-15 LAB
C TRACH DNA SPEC QL NAA+PROBE: NEGATIVE
HIV 1+2 AB+HIV1 P24 AG SERPL QL IA: NONREACTIVE
N GONORRHOEA DNA SPEC QL NAA+PROBE: NEGATIVE

## 2021-09-07 ENCOUNTER — VIRTUAL VISIT (OUTPATIENT)
Dept: FAMILY MEDICINE | Facility: CLINIC | Age: 36
End: 2021-09-07
Payer: COMMERCIAL

## 2021-09-07 DIAGNOSIS — F41.1 GENERALIZED ANXIETY DISORDER: ICD-10-CM

## 2021-09-07 DIAGNOSIS — F41.0 PANIC ATTACK: Primary | ICD-10-CM

## 2021-09-07 PROCEDURE — 99214 OFFICE O/P EST MOD 30 MIN: CPT | Mod: 95 | Performed by: FAMILY MEDICINE

## 2021-09-07 RX ORDER — PROPRANOLOL HYDROCHLORIDE 10 MG/1
10 TABLET ORAL 3 TIMES DAILY PRN
Qty: 90 TABLET | Refills: 3 | Status: SHIPPED | OUTPATIENT
Start: 2021-09-07 | End: 2022-01-06

## 2021-09-07 ASSESSMENT — ANXIETY QUESTIONNAIRES
6. BECOMING EASILY ANNOYED OR IRRITABLE: NOT AT ALL
1. FEELING NERVOUS, ANXIOUS, OR ON EDGE: NEARLY EVERY DAY
2. NOT BEING ABLE TO STOP OR CONTROL WORRYING: NEARLY EVERY DAY
3. WORRYING TOO MUCH ABOUT DIFFERENT THINGS: NEARLY EVERY DAY
5. BEING SO RESTLESS THAT IT IS HARD TO SIT STILL: SEVERAL DAYS
GAD7 TOTAL SCORE: 15
7. FEELING AFRAID AS IF SOMETHING AWFUL MIGHT HAPPEN: NEARLY EVERY DAY
IF YOU CHECKED OFF ANY PROBLEMS ON THIS QUESTIONNAIRE, HOW DIFFICULT HAVE THESE PROBLEMS MADE IT FOR YOU TO DO YOUR WORK, TAKE CARE OF THINGS AT HOME, OR GET ALONG WITH OTHER PEOPLE: SOMEWHAT DIFFICULT

## 2021-09-07 ASSESSMENT — PATIENT HEALTH QUESTIONNAIRE - PHQ9
SUM OF ALL RESPONSES TO PHQ QUESTIONS 1-9: 13
5. POOR APPETITE OR OVEREATING: MORE THAN HALF THE DAYS

## 2021-09-07 NOTE — PATIENT INSTRUCTIONS
"Anxiety is very common but can be quite disabling. It is absolutely treatable!       One important thing to know is that treating anxiety can be very difficult initially because your body is in a vigilant, hyperaware state when you're chronically anxious, so any changes in how your body is feeling can trigger additional anxiety response. This psychological response can be triggered by starting a new medication. Sometimes just knowing how your brain and body might react can help you cope with any side effects you might have from the medication so that you can stay on them long enough to be effective.      The best therapy for anxiety is cognitive behavioral therapy, which is essentially coaching to help you redirect spiraling thoughts. Therapy is the gold standard for treating anxiety, as it has the best, most effective long term results.     You can contact our clinic anytime to schedule a virtual appointment with Talha Laura, our behavioralist here at [facility], who can help provide therapy for anxiety and depression.    book \"Mind over Mood\".                   "

## 2021-09-07 NOTE — PROGRESS NOTES
Durga is a 36 year old who is being evaluated via a billable video visit.      How would you like to obtain your AVS? MyChart  If the video visit is dropped, the invitation should be resent by: Send to e-mail at: [email or web address]  Will anyone else be joining your video visit? No  Video Start Time: 4:34 PM    Assessment & Plan     Panic attack  See AVS, see orders, referred to our behavioralist  - propranolol (INDERAL) 10 MG tablet; Take 1 tablet (10 mg) by mouth 3 times daily as needed (anxiety panic attack)    Generalized anxiety disorder  See above, new diagnosis but chronic problem with acute exaccarbation, recommended CBT, propranolol as needed, consider adding Celexa as needed.    56}       Depression Screening Follow Up    PHQ 9/7/2021   PHQ-9 Total Score 13   Q9: Thoughts of better off dead/self-harm past 2 weeks Not at all     Last PHQ-9 9/7/2021   1.  Little interest or pleasure in doing things 2   2.  Feeling down, depressed, or hopeless 1   3.  Trouble falling or staying asleep, or sleeping too much 2   4.  Feeling tired or having little energy 2   5.  Poor appetite or overeating 2   6.  Feeling bad about yourself 1   7.  Trouble concentrating 2   8.  Moving slowly or restless 1   Q9: Thoughts of better off dead/self-harm past 2 weeks 0   PHQ-9 Total Score 13   Difficulty at work, home, or with people Somewhat difficult       Follow Up Actions Taken  Patient counseled, no additional follow up at this time.  Patient has experienced a recent significant life event.  Patient counseled, no additional follow up at this time.  Depression Action Plan reviewed with patient.  Follow up recommended: our behavioralist         Return in about 2 weeks (around 9/21/2021) for follow up.    Kayleen Mora MD  [facility]    Los Calixto is a 36 year old who presents for the following health issues     HPI     Abnormal Mood Symptoms  Onset/Duration: Long time of anxiety all  time, worse premenstrually, can be severe  She thinks she's had anxiety since elementary school, much worse over the past few months, now impacting productivity, now getting more and more avoidant about work  She's never had therapy  Uses [employer], tries to get outside, exercise  She's been on selective serotonin reuptake inhibitor in the past during a tough time, but didn't feel very well on it  She moved to [place] from [facility] during the pandemic, works from home and lives alone, so has been feeling very socially isolated  Description: Gets worse before her periods, feeling panic like she cant move and worrying, feeling frozen in fear  Depression (if yes, do PHQ-9): no  Anxiety (if yes, do DAYNE-7): YES  Accompanying Signs & Symptoms:  Still participating in activities that you used to enjoy: YES  Fatigue: YES  Irritability: no  Difficulty concentrating: YES  Changes in appetite: YES- binging more and not eating at all on and off  Problems with sleep: YES- wakes up at night  Heart racing/beating fast: YES- heart racing once or twice a month  Abnormally elevated, expansive, or irritable mood: no  Persistently increased activity or energy: YES  Thoughts of hurting yourself or others: no  History:  Recent stress or major life event: YES- moved location and pandemic  Prior depression or anxiety: None  Family history of depression or anxiety: YES- sister(both) other family have symptoms but have not been iagnosed  Alcohol/drug use: YES- alcohol socially  Difficulty sleeping: YES  Precipitating or alleviating factors: PMS makes it really bad, work load  Therapies tried and outcome: lifestyle changes , meditate,exercise and walking  PHQ 9/7/2021   PHQ-9 Total Score 13   Q9: Thoughts of better off dead/self-harm past 2 weeks Not at all     DAYNE-7 SCORE 9/7/2021   Total Score 15         Review of Systems   Constitutional, HEENT, cardiovascular, pulmonary, GI, , musculoskeletal, neuro, skin, endocrine and psych systems are  "negative, except as otherwise noted.      Objective    Vitals - Patient Reported  Weight (Patient Reported): 108.9 kg (240 lb)  Height (Patient Reported): 166.4 cm (5' 5.5\")  BMI (Based on Pt Reported Ht/Wt): 39.33      Vitals:  No vitals were obtained today due to virtual visit.    Physical Exam   GENERAL: Healthy, alert and no distress  EYES: Eyes grossly normal to inspection.  No discharge or erythema, or obvious scleral/conjunctival abnormalities.  RESP: No audible wheeze, cough, or visible cyanosis.  No visible retractions or increased work of breathing.    SKIN: Visible skin clear. No significant rash, abnormal pigmentation or lesions.  NEURO: Cranial nerves grossly intact.  Mentation and speech appropriate for age.  PSYCH: Mentation appears normal, affect normal/bright, judgement and insight intact, normal speech and appearance well-groomed.          Video-Visit Details    Type of service:  Video Visit    Video End Time:4:55 PM    Originating Location (pt. Location): Home    Distant Location (provider location):  [facility]     Platform used for Video Visit: Марина  "

## 2021-09-07 NOTE — LETTER
My Depression Action Plan  Name: Durga Flannery   Date of Birth 1985  Date: 9/7/2021    My doctor: Hernandez Torres   My clinic: [facility] [address] [place] [id number] [phone]          GREEN    ZONE   Good Control    What it looks like:     Things are going generally well. You have normal ups and downs. You may even feel depressed from time to time, but bad moods usually last less than a day.   What you need to do:  1. Continue to care for yourself (see self care plan)  2. Check your depression survival kit and update it as needed  3. Follow your physician s recommendations including any medication.  4. Do not stop taking medication unless you consult with your physician first.           YELLOW         ZONE Getting Worse    What it looks like:     Depression is starting to interfere with your life.     It may be hard to get out of bed; you may be starting to isolate yourself from others.    Symptoms of depression are starting to last most all day and this has happened for several days.     You may have suicidal thoughts but they are not constant.   What you need to do:     1. Call your care team. Your response to treatment will improve if you keep your care team informed of your progress. Yellow periods are signs an adjustment may need to be made.     2. Continue your self-care.  Just get dressed and ready for the day.  Don't give yourself time to talk yourself out of it.    3. Talk to someone in your support network.    4. Open up your Depression Self-Care Plan/Wellness Kit.           RED    ZONE Medical Alert - Get Help    What it looks like:     Depression is seriously interfering with your life.     You may experience these or other symptoms: You can t get out of bed most days, can t work or engage in other necessary activities, you have trouble taking care of basic hygiene, or basic responsibilities, thoughts of suicide or death that will not go  away, self-injurious behavior.     What you need to do:  1. Call your care team and request a same-day appointment. If they are not available (weekends or after hours) call your local crisis line, emergency room or 911.          Depression Self-Care Plan / Wellness Kit    Many people find that medication and therapy are helpful treatments for managing depression. In addition, making small changes to your everyday life can help to boost your mood and improve your wellbeing. Below are some tips for you to consider. Be sure to talk with your medical provider and/or behavioral health consultant if your symptoms are worsening or not improving.     Sleep   Sleep hygiene  means all of the habits that support good, restful sleep. It includes maintaining a consistent bedtime and wake time, using your bedroom only for sleeping or sex, and keeping the bedroom dark and free of distractions like a computer, smartphone, or television.     Develop a Healthy Routine  Maintain good hygiene. Get out of bed in the morning, make your bed, brush your teeth, take a shower, and get dressed. Don t spend too much time viewing media that makes you feel stressed. Find time to relax each day.    Exercise  Get some form of exercise every day. This will help reduce pain and release endorphins, the  feel good  chemicals in your brain. It can be as simple as just going for a walk or doing some gardening, anything that will get you moving.      Diet  Strive to eat healthy foods, including fruits and vegetables. Drink plenty of water. Avoid excessive sugar, caffeine, alcohol, and other mood-altering substances.     Stay Connected with Others  Stay in touch with friends and family members.    Manage Your Mood  Try deep breathing, massage therapy, biofeedback, or meditation. Take part in fun activities when you can. Try to find something to smile about each day.     Psychotherapy  Be open to working with a therapist if your provider recommends it.      Medication  Be sure to take your medication as prescribed. Most anti-depressants need to be taken every day. It usually takes several weeks for medications to work. Not all medicines work for all people. It is important to follow-up with your provider to make sure you have a treatment plan that is working for you. Do not stop your medication abruptly without first discussing it with your provider.    Crisis Resources   These hotlines are for both adults and children. They and are open 24 hours a day, 7 days a week unless noted otherwise.      National Suicide Prevention Lifeline   8-700-163-TALK (9803)      Crisis Text Line    www.crisistextline.org  Text HOME to 495355 from anywhere in the [place], anytime, about any type of crisis. A live, trained crisis counselor will receive the text and respond quickly.      [place] Lifeline for LGBTQ Youth  A national crisis intervention and suicide lifeline for LGBTQ youth under 25. Provides a safe place to talk without judgement. Call 1-373.465.9376; text START to 430538 or visit www.thetrevorproject.org to talk to a trained counselor.      For [facility] crisis numbers, visit the [facility] website at:  https://[place].gov/dhs/people-we-serve/adults/health-care/mental-health/resources/crisis-contacts.jsp

## 2021-09-08 ASSESSMENT — ANXIETY QUESTIONNAIRES: GAD7 TOTAL SCORE: 15

## 2021-10-01 DIAGNOSIS — F41.0 PANIC ATTACK: ICD-10-CM

## 2021-10-05 RX ORDER — PROPRANOLOL HYDROCHLORIDE 10 MG/1
TABLET ORAL
Qty: 0.1 TABLET | Refills: 0 | OUTPATIENT
Start: 2021-10-05

## 2021-10-11 ENCOUNTER — HEALTH MAINTENANCE LETTER (OUTPATIENT)
Age: 36
End: 2021-10-11

## 2021-11-09 ENCOUNTER — IMMUNIZATION (OUTPATIENT)
Dept: NURSING | Facility: CLINIC | Age: 36
End: 2021-11-09
Payer: COMMERCIAL

## 2021-11-09 PROCEDURE — 0064A COVID-19,PF,MODERNA (18+ YRS BOOSTER .25ML): CPT

## 2021-11-09 PROCEDURE — 91306 COVID-19,PF,MODERNA (18+ YRS BOOSTER .25ML): CPT

## 2022-01-05 DIAGNOSIS — F41.0 PANIC ATTACK: ICD-10-CM

## 2022-01-06 NOTE — TELEPHONE ENCOUNTER
Pt was suppose to f/u 2 weeks after visit on 9/7/21 . This was not done  Message sent ot pt on my chart    Kia Timmons, RN, BSN  [facility]

## 2022-01-11 RX ORDER — PROPRANOLOL HYDROCHLORIDE 10 MG/1
10 TABLET ORAL 3 TIMES DAILY PRN
Qty: 270 TABLET | Refills: 0 | Status: SHIPPED | OUTPATIENT
Start: 2022-01-11 | End: 2022-05-16

## 2022-01-30 ENCOUNTER — HEALTH MAINTENANCE LETTER (OUTPATIENT)
Age: 37
End: 2022-01-30

## 2022-02-13 ASSESSMENT — ENCOUNTER SYMPTOMS
HEADACHES: 0
FREQUENCY: 0
SHORTNESS OF BREATH: 0
DIZZINESS: 0
HEARTBURN: 0
CHILLS: 0
WEAKNESS: 0
PALPITATIONS: 0
FEVER: 0
CONSTIPATION: 0
BREAST MASS: 0
JOINT SWELLING: 0
DIARRHEA: 0
MYALGIAS: 0
ARTHRALGIAS: 0
PARESTHESIAS: 0
ABDOMINAL PAIN: 0
COUGH: 0
NERVOUS/ANXIOUS: 1
SORE THROAT: 0
HEMATOCHEZIA: 0
HEMATURIA: 0
DYSURIA: 0
EYE PAIN: 0

## 2022-02-16 ENCOUNTER — OFFICE VISIT (OUTPATIENT)
Dept: FAMILY MEDICINE | Facility: CLINIC | Age: 37
End: 2022-02-16
Payer: COMMERCIAL

## 2022-02-16 VITALS
DIASTOLIC BLOOD PRESSURE: 88 MMHG | BODY MASS INDEX: 40.18 KG/M2 | OXYGEN SATURATION: 100 % | HEART RATE: 81 BPM | WEIGHT: 250 LBS | SYSTOLIC BLOOD PRESSURE: 132 MMHG | TEMPERATURE: 97.9 F | HEIGHT: 66 IN

## 2022-02-16 DIAGNOSIS — F41.1 GAD (GENERALIZED ANXIETY DISORDER): ICD-10-CM

## 2022-02-16 DIAGNOSIS — Z00.00 ROUTINE GENERAL MEDICAL EXAMINATION AT A HEALTH CARE FACILITY: Primary | ICD-10-CM

## 2022-02-16 DIAGNOSIS — E66.01 MORBID OBESITY (H): ICD-10-CM

## 2022-02-16 DIAGNOSIS — N36.8 SKENE'S GLAND CYST: ICD-10-CM

## 2022-02-16 LAB
ERYTHROCYTE [DISTWIDTH] IN BLOOD BY AUTOMATED COUNT: 12.2 % (ref 10–15)
HBA1C MFR BLD: 5 % (ref 0–5.6)
HCT VFR BLD AUTO: 43.2 % (ref 35–47)
HGB BLD-MCNC: 14.1 G/DL (ref 11.7–15.7)
MCH RBC QN AUTO: 30.1 PG (ref 26.5–33)
MCHC RBC AUTO-ENTMCNC: 32.6 G/DL (ref 31.5–36.5)
MCV RBC AUTO: 92 FL (ref 78–100)
PLATELET # BLD AUTO: 347 10E3/UL (ref 150–450)
RBC # BLD AUTO: 4.68 10E6/UL (ref 3.8–5.2)
WBC # BLD AUTO: 8.8 10E3/UL (ref 4–11)

## 2022-02-16 PROCEDURE — 87070 CULTURE OTHR SPECIMN AEROBIC: CPT | Performed by: NURSE PRACTITIONER

## 2022-02-16 PROCEDURE — 90471 IMMUNIZATION ADMIN: CPT | Performed by: NURSE PRACTITIONER

## 2022-02-16 PROCEDURE — 90715 TDAP VACCINE 7 YRS/> IM: CPT | Performed by: NURSE PRACTITIONER

## 2022-02-16 PROCEDURE — 87205 SMEAR GRAM STAIN: CPT | Performed by: NURSE PRACTITIONER

## 2022-02-16 PROCEDURE — 99214 OFFICE O/P EST MOD 30 MIN: CPT | Mod: 25 | Performed by: NURSE PRACTITIONER

## 2022-02-16 PROCEDURE — 84443 ASSAY THYROID STIM HORMONE: CPT | Performed by: NURSE PRACTITIONER

## 2022-02-16 PROCEDURE — 36415 COLL VENOUS BLD VENIPUNCTURE: CPT | Performed by: NURSE PRACTITIONER

## 2022-02-16 PROCEDURE — 83036 HEMOGLOBIN GLYCOSYLATED A1C: CPT | Performed by: NURSE PRACTITIONER

## 2022-02-16 PROCEDURE — 80061 LIPID PANEL: CPT | Performed by: NURSE PRACTITIONER

## 2022-02-16 PROCEDURE — 99395 PREV VISIT EST AGE 18-39: CPT | Mod: 25 | Performed by: NURSE PRACTITIONER

## 2022-02-16 PROCEDURE — 85027 COMPLETE CBC AUTOMATED: CPT | Performed by: NURSE PRACTITIONER

## 2022-02-16 PROCEDURE — 80053 COMPREHEN METABOLIC PANEL: CPT | Performed by: NURSE PRACTITIONER

## 2022-02-16 ASSESSMENT — ENCOUNTER SYMPTOMS
ABDOMINAL PAIN: 0
HEARTBURN: 0
EYE PAIN: 0
DYSURIA: 0
SHORTNESS OF BREATH: 0
BREAST MASS: 0
CONSTIPATION: 0
NERVOUS/ANXIOUS: 1
JOINT SWELLING: 0
FEVER: 0
SORE THROAT: 0
FREQUENCY: 0
COUGH: 0
DIZZINESS: 0
DIARRHEA: 0
HEMATOCHEZIA: 0
CHILLS: 0
WEAKNESS: 0
PARESTHESIAS: 0
MYALGIAS: 0
PALPITATIONS: 0
HEADACHES: 0
ARTHRALGIAS: 0
HEMATURIA: 0

## 2022-02-16 NOTE — NURSING NOTE
Prior to immunization administration, verified patients identity using patient s name and date of birth. Please see Immunization Activity for additional information.     Screening Questionnaire for Adult Immunization    Are you sick today?   No   Do you have allergies to medications, food, a vaccine component or latex?   No   Have you ever had a serious reaction after receiving a vaccination?   No   Do you have a long-term health problem with heart, lung, kidney, or metabolic disease (e.g., diabetes), asthma, a blood disorder, no spleen, complement component deficiency, a cochlear implant, or a spinal fluid leak?  Are you on long-term aspirin therapy?   No   Do you have cancer, leukemia, HIV/AIDS, or any other immune system problem?   No   Do you have a parent, brother, or sister with an immune system problem?   No   In the past 3 months, have you taken medications that affect  your immune system, such as prednisone, other steroids, or anticancer drugs; drugs for the treatment of rheumatoid arthritis, Crohn s disease, or psoriasis; or have you had radiation treatments?   No   Have you had a seizure, or a brain or other nervous system problem?   No   During the past year, have you received a transfusion of blood or blood    products, or been given immune (gamma) globulin or antiviral drug?   No   For women: Are you pregnant or is there a chance you could become       pregnant during the next month?   No   Have you received any vaccinations in the past 4 weeks?   No     Immunization questionnaire answers were all negative.        Per orders of Сергей Massey CNP, injection of TDAP given by Ronna Mcclain. Patient instructed to remain in clinic for 15 minutes afterwards, and to report any adverse reaction to me immediately.       Screening performed by Ronna Mcclain on 2/16/2022 at 3:42 PM.

## 2022-02-16 NOTE — PROGRESS NOTES
SUBJECTIVE:   CC: Durga Flannery is an 36 year old woman who presents for preventive health visit.     Patient has been advised of split billing requirements and indicates understanding: Yes     37-year-old female with past medical history morbid obesity, generalized anxiety with panic attacks in clinic for preventative health exam.  Patient requesting completion of biometric screening form for work.  Complaint of vaginal cyst/pimple.  - [place] gland cyst: Started 1 month ago; occurring intermittently; was able to express white drainage from area;  nontender, no intercourse, no drainage , denies dysuria, hematuria, abnormal discharge or active bleeding;Contraception:: not sexually active at the moment;   - anxiety: increased over the past few weeks due to work; not making time for self; 1 panic attack in the past month; wakes up over night; taking propranool more regularly in the past 3 weeks    Today's PHQ-2 Score:   PHQ-2 ( 1999 [employer]) 2/13/2022   Q1: Little interest or pleasure in doing things 1   Q2: Feeling down, depressed or hopeless 1   PHQ-2 Score 2   PHQ-2 Total Score (12-17 Years)- Positive if 3 or more points; Administer PHQ-A if positive -   Q1: Little interest or pleasure in doing things Several days   Q2: Feeling down, depressed or hopeless Several days   PHQ-2 Score 2       PHQ 9/7/2021   PHQ-9 Total Score 13   Q9: Thoughts of better off dead/self-harm past 2 weeks Not at all     DAYNE-7 SCORE 9/7/2021   Total Score 15       Abuse: Current or Past (Physical, Sexual or Emotional) - No  Do you feel safe in your environment? Yes    Social History     Tobacco Use     Smoking status: Never Smoker     Smokeless tobacco: Never Used   Substance Use Topics     Alcohol use: Yes     Comment: 3/ week     If you drink alcohol do you typically have >3 drinks per day or >7 drinks per week? No    Alcohol Use 2/13/2022   Prescreen: >3 drinks/day or >7 drinks/week? No   No flowsheet data found.    Reviewed orders with  patient.  Reviewed health maintenance and updated orders accordingly - Yes  Labs reviewed in EPIC  BP Readings from Last 3 Encounters:   02/16/22 132/88   07/13/21 134/81   03/16/21 127/88    Wt Readings from Last 3 Encounters:   02/16/22 113.4 kg (250 lb)   07/13/21 111.8 kg (246 lb 6.4 oz)   03/16/21 108.9 kg (240 lb)          Breast Cancer Screening:    FHS-7:   Breast CA Risk Assessment (FHS-7) 2/13/2022   Did any of your first-degree relatives have breast or ovarian cancer? Yes   Did any of your relatives have bilateral breast cancer? No   Did any man in your family have breast cancer? No   Did any woman in your family have breast and ovarian cancer? Yes   Did any woman in your family have breast cancer before age 50 y? No   Do you have 2 or more relatives with breast and/or ovarian cancer? Yes   Do you have 2 or more relatives with breast and/or bowel cancer? No     Patient under 40 years of age: Routine Mammogram Screening not recommended.   Pertinent mammograms are reviewed under the imaging tab.    History of abnormal Pap smear: NO - age 30- 65 PAP every 3 years recommended  NO - age 30-65 PAP every 5 years with negative HPV co-testing recommended  PAP / HPV Latest Ref Rng & Units 3/16/2021   PAP (Historical) - NIL   HPV16 NEG:Negative Negative   HPV18 NEG:Negative Negative   HRHPV NEG:Negative Negative     Reviewed and updated as needed this visit by clinical staff   Tobacco  Allergies  Meds  Problems  Med Hx  Surg Hx  Fam Hx  Soc   Hx      Reviewed and updated as needed this visit by Provider   Tobacco  Allergies  Meds  Problems  Med Hx  Surg Hx  Fam Hx         History reviewed. No pertinent past medical history.     Review of Systems   Constitutional: Negative for chills and fever.   HENT: Negative for congestion, ear pain, hearing loss and sore throat.    Eyes: Negative for pain and visual disturbance.   Respiratory: Negative for cough and shortness of breath.    Cardiovascular: Negative  "for chest pain, palpitations and peripheral edema.   Gastrointestinal: Negative for abdominal pain, constipation, diarrhea, heartburn and hematochezia.   Breasts:  Negative for tenderness, breast mass and discharge.   Genitourinary: Negative for dysuria, frequency, genital sores, hematuria, pelvic pain, vaginal bleeding and vaginal discharge.   Musculoskeletal: Negative for arthralgias, joint swelling and myalgias.   Skin: Negative for rash.   Neurological: Negative for dizziness, weakness, headaches and paresthesias.   Psychiatric/Behavioral: Positive for mood changes. The patient is nervous/anxious.         OBJECTIVE:   /88   Pulse 81   Temp 97.9  F (36.6  C)   Ht 1.67 m (5' 5.75\")   Wt 113.4 kg (250 lb)   LMP 02/11/2022 (Exact Date)   SpO2 100%   BMI 40.66 kg/m    Physical Exam  GENERAL: healthy, alert and no distress  EYES: Eyes grossly normal to inspection, PERRL and conjunctivae and sclerae normal  HENT: ear canals and TM's normal, nose and mouth without ulcers or lesions  NECK: no adenopathy, no asymmetry, masses, or scars and thyroid normal to palpation  RESP: lungs clear to auscultation - no rales, rhonchi or wheezes  BREAST: normal without masses, tenderness or nipple discharge and no palpable axillary masses or adenopathy  CV: regular rate and rhythm, normal S1 S2, no S3 or S4, no murmur, click or rub, no peripheral edema and peripheral pulses strong  ABDOMEN: soft, nontender, no hepatosplenomegaly, no masses and bowel sounds normal   (female): normal female external genitalia, normal urethral meatus , vaginal mucosa pink, moist, well rugated and [place]'s gland small 3-4mm cyst erythematous, nontender, thick white drainage expressed  MS: no gross musculoskeletal defects noted, no edema  SKIN: no suspicious lesions or rashes  NEURO: Normal strength and tone, mentation intact and speech normal  PSYCH: mentation appears normal, affect normal/bright    Diagnostic Test Results:  Labs reviewed in " Epic  Results for orders placed or performed in visit on 02/16/22   CBC with platelets     Status: Normal   Result Value Ref Range    WBC Count 8.8 4.0 - 11.0 10e3/uL    RBC Count 4.68 3.80 - 5.20 10e6/uL    Hemoglobin 14.1 11.7 - 15.7 g/dL    Hematocrit 43.2 35.0 - 47.0 %    MCV 92 78 - 100 fL    MCH 30.1 26.5 - 33.0 pg    MCHC 32.6 31.5 - 36.5 g/dL    RDW 12.2 10.0 - 15.0 %    Platelet Count 347 150 - 450 10e3/uL   Comprehensive metabolic panel     Status: Normal   Result Value Ref Range    Sodium 136 133 - 144 mmol/L    Potassium 4.1 3.4 - 5.3 mmol/L    Chloride 106 94 - 109 mmol/L    Carbon Dioxide (CO2) 23 20 - 32 mmol/L    Anion Gap 7 3 - 14 mmol/L    Urea Nitrogen 20 7 - 30 mg/dL    Creatinine 0.89 0.52 - 1.04 mg/dL    Calcium 9.0 8.5 - 10.1 mg/dL    Glucose 78 70 - 99 mg/dL    Alkaline Phosphatase 60 40 - 150 U/L    AST 15 0 - 45 U/L    ALT 21 0 - 50 U/L    Protein Total 7.7 6.8 - 8.8 g/dL    Albumin 3.6 3.4 - 5.0 g/dL    Bilirubin Total 0.4 0.2 - 1.3 mg/dL    GFR Estimate 85 >60 mL/min/1.73m2   Hemoglobin A1c     Status: Normal   Result Value Ref Range    Hemoglobin A1C 5.0 0.0 - 5.6 %   Lipid panel reflex to direct LDL Fasting     Status: Abnormal   Result Value Ref Range    Cholesterol 151 <200 mg/dL    Triglycerides 38 <150 mg/dL    Direct Measure HDL 34 (L) >=50 mg/dL    LDL Cholesterol Calculated 109 (H) <=100 mg/dL    Non HDL Cholesterol 117 <130 mg/dL    Patient Fasting > 8hrs? No     Narrative    Cholesterol  Desirable:  <200 mg/dL    Triglycerides  Normal:  Less than 150 mg/dL  Borderline High:  150-199 mg/dL  High:  200-499 mg/dL  Very High:  Greater than or equal to 500 mg/dL    Direct Measure HDL  Female:  Greater than or equal to 50 mg/dL   Male:  Greater than or equal to 40 mg/dL    LDL Cholesterol  Desirable:  <100mg/dL  Above Desirable:  100-129 mg/dL   Borderline High:  130-159 mg/dL   High:  160-189 mg/dL   Very High:  >= 190 mg/dL    Non HDL Cholesterol  Desirable:  130 mg/dL  Above  Desirable:  130-159 mg/dL  Borderline High:  160-189 mg/dL  High:  190-219 mg/dL  Very High:  Greater than or equal to 220 mg/dL   TSH with free T4 reflex     Status: Normal   Result Value Ref Range    TSH 1.33 0.40 - 4.00 mU/L   Cyst Aerobic Bacterial Culture Routine with Gram Stain     Status: Abnormal    Specimen: Vaginal Mucosa; Cyst   Result Value Ref Range    Culture 1+ Normal frank     Gram Stain Result 1+ Gram positive cocci (A)     Gram Stain Result 1+ Gram negative bacilli (A)     Gram Stain Result No white blood cells seen (A)        ASSESSMENT/PLAN:   Durga was seen today for physical.    Diagnoses and all orders for this visit:    Routine general medical examination at a health care facility  Preventative exam completed no abnormalities; discussed health maintenance screenings including breasts, colorectal testing; reviewed medication, PMH  Plan: We will obtain annual labs  -     CBC with platelets; Future  -     Comprehensive metabolic panel; Future  -     Hemoglobin A1c; Future  -     Lipid panel reflex to direct LDL Fasting; Future  -     TSH with free T4 reflex; Future  -     PREVENTIVE VISIT,EST,18-39    [place] gland cyst  Will treat empirically with Bactrim and Augmentin; obtain cultures  Plan:   -     fluconazole (DIFLUCAN) 150 MG tablet; Take 1 tablet (150 mg) by mouth once for 1 dose  -     sulfamethoxazole-trimethoprim (BACTRIM DS) 800-160 MG tablet; Take 1 tablet by mouth 2 times daily for 7 days  -     amoxicillin-clavulanate (AUGMENTIN) 875-125 MG tablet; Take 1 tablet by mouth 2 times daily for 7 days  -     Anaerobic bacterial culture; Future  -     Cyst Aerobic Bacterial Culture Routine with Gram Stain    Morbid obesity (H)  body mass index is 40.66;  Weight 113.4 kg (250 lb)  Plan: Discussed dietary/daily exercise routine; obesity management clinic with medication and dietitian options    DAYNE (generalized anxiety disorder)  Increased stress and anxiety at work; taking propanolol  "regularly with improvement in symptoms; feels she is coping well  Plan: Continue to follow-up;and offer mental health therapy    Other orders  -     TDAP VACCINE (Adacel, Boostrix)  [[id number]]    Patient has been advised of split billing requirements and indicates understanding: Yes    COUNSELING:  Reviewed preventive health counseling, as reflected in patient instructions       Regular exercise       Healthy diet/nutrition       Contraception       Safe sex practices/STD prevention    Estimated body mass index is 40.66 kg/m  as calculated from the following:    Height as of this encounter: 1.67 m (5' 5.75\").    Weight as of this encounter: 113.4 kg (250 lb).    Weight management plan: Discussed healthy diet and exercise guidelines    She reports that she has never smoked. She has never used smokeless tobacco.      Counseling Resources:  ATP IV Guidelines  Pooled Cohorts Equation Calculator  Breast Cancer Risk Calculator  BRCA-Related Cancer Risk Assessment: FHS-7 Tool  FRAX Risk Assessment  ICSI Preventive Guidelines  Dietary Guidelines for Americans, 2010  [employer]'s MyPlate  ASA Prophylaxis  Lung CA Screening    ORAL Carpenter [facility]  Answers for HPI/ROS submitted by the patient on 2/13/2022  Frequency of exercise:: 2-3 days/week  Getting at least 3 servings of Calcium per day:: Yes  Diet:: Carbohydrate counting  Taking medications regularly:: No  Medication side effects:: None  Bi-annual eye exam:: NO  Dental care twice a year:: Yes  Sleep apnea or symptoms of sleep apnea:: Excessive snoring  Additional concerns today:: Yes  Duration of exercise:: 45-60 minutes  Barriers to taking medications:: Problems remembering to take them      "

## 2022-02-17 LAB
ALBUMIN SERPL-MCNC: 3.6 G/DL (ref 3.4–5)
ALP SERPL-CCNC: 60 U/L (ref 40–150)
ALT SERPL W P-5'-P-CCNC: 21 U/L (ref 0–50)
ANION GAP SERPL CALCULATED.3IONS-SCNC: 7 MMOL/L (ref 3–14)
AST SERPL W P-5'-P-CCNC: 15 U/L (ref 0–45)
BILIRUB SERPL-MCNC: 0.4 MG/DL (ref 0.2–1.3)
BUN SERPL-MCNC: 20 MG/DL (ref 7–30)
CALCIUM SERPL-MCNC: 9 MG/DL (ref 8.5–10.1)
CHLORIDE BLD-SCNC: 106 MMOL/L (ref 94–109)
CHOLEST SERPL-MCNC: 151 MG/DL
CO2 SERPL-SCNC: 23 MMOL/L (ref 20–32)
CREAT SERPL-MCNC: 0.89 MG/DL (ref 0.52–1.04)
FASTING STATUS PATIENT QL REPORTED: NO
GFR SERPL CREATININE-BSD FRML MDRD: 85 ML/MIN/1.73M2
GLUCOSE BLD-MCNC: 78 MG/DL (ref 70–99)
HDLC SERPL-MCNC: 34 MG/DL
LDLC SERPL CALC-MCNC: 109 MG/DL
NONHDLC SERPL-MCNC: 117 MG/DL
POTASSIUM BLD-SCNC: 4.1 MMOL/L (ref 3.4–5.3)
PROT SERPL-MCNC: 7.7 G/DL (ref 6.8–8.8)
SODIUM SERPL-SCNC: 136 MMOL/L (ref 133–144)
TRIGL SERPL-MCNC: 38 MG/DL
TSH SERPL DL<=0.005 MIU/L-ACNC: 1.33 MU/L (ref 0.4–4)

## 2022-02-18 LAB
BACTERIA FLD CULT: ABNORMAL
GRAM STAIN RESULT: ABNORMAL

## 2022-02-24 ENCOUNTER — MYC MEDICAL ADVICE (OUTPATIENT)
Dept: FAMILY MEDICINE | Facility: CLINIC | Age: 37
End: 2022-02-24
Payer: COMMERCIAL

## 2022-02-28 RX ORDER — FLUCONAZOLE 150 MG/1
150 TABLET ORAL ONCE
Qty: 1 TABLET | Refills: 0 | Status: SHIPPED | OUTPATIENT
Start: 2022-02-28 | End: 2022-02-28

## 2022-02-28 RX ORDER — SULFAMETHOXAZOLE/TRIMETHOPRIM 800-160 MG
1 TABLET ORAL 2 TIMES DAILY
Qty: 14 TABLET | Refills: 0 | Status: SHIPPED | OUTPATIENT
Start: 2022-02-28 | End: 2022-03-07

## 2022-02-28 NOTE — TELEPHONE ENCOUNTER
Apologies, I did not sign the order. Bactrim and Augmentin sent to pharmacy.    ORAL Carpenter CNP

## 2022-02-28 NOTE — TELEPHONE ENCOUNTER
Сергей- see patients message, I am not seeing a prescription for an abx or mention of prescribing abx at recent 2/16/22 office visit    STEPHANIE GutierrezN RN  [facility]

## 2022-03-01 ENCOUNTER — MYC MEDICAL ADVICE (OUTPATIENT)
Dept: FAMILY MEDICINE | Facility: CLINIC | Age: 37
End: 2022-03-01
Payer: COMMERCIAL

## 2022-03-01 DIAGNOSIS — E66.01 MORBID OBESITY (H): Primary | ICD-10-CM

## 2022-03-01 NOTE — TELEPHONE ENCOUNTER
Dr. Torres-Please review and sign if agree.  Patient requested referral to Nutrition for obesity.    Referral pended.    Thank you!  STEPHANIE QuijanoN, RN  [facility]

## 2022-03-08 RX ORDER — INFLUENZA A VIRUS A/GUANGDONG-MAONAN/SWL1536/2019 CNIC-1909 (H1N1) ANTIGEN (FORMALDEHYDE INACTIVATED), INFLUENZA A VIRUS A/HONG KONG/2671/2019 (H3N2) ANTIGEN (FORMALDEHYDE INACTIVATED), INFLUENZA B VIRUS B/PHUKET/3073/2013 ANTIGEN (FORMALDEHYDE INACTIVATED), AND INFLUENZA B VIRUS B/WASHINGTON/02/2019 ANTIGEN (FORMALDEHYDE INACTIVATED) 15; 15; 15; 15 UG/.5ML; UG/.5ML; UG/.5ML; UG/.5ML
0.5 INJECTION, SUSPENSION INTRAMUSCULAR ONCE
COMMUNITY
Start: 2021-09-14 | End: 2022-09-30

## 2022-04-29 ENCOUNTER — HOSPITAL ENCOUNTER (OUTPATIENT)
Dept: NUTRITION | Facility: CLINIC | Age: 37
Discharge: HOME | End: 2022-04-29
Attending: FAMILY MEDICINE | Admitting: FAMILY MEDICINE
Payer: COMMERCIAL

## 2022-04-29 DIAGNOSIS — E66.01 MORBID OBESITY (H): ICD-10-CM

## 2022-04-29 PROCEDURE — 97802 MEDICAL NUTRITION INDIV IN: CPT | Mod: GT,95 | Performed by: DIETITIAN, REGISTERED

## 2022-04-29 NOTE — DISCHARGE INSTRUCTIONS
Emmanuel Calixto,    It was nice meeting you today.  Here are the goals we discussed:    GOALS  Balanced carbohydrate intake (30 grams per meal)   Focus on whole foods (fruit) as snacks vs processed foods  Track intake 2 days during the week and 2 day on the weekend   Aim for 0788-7877 calories     Diet Guidelines after Weight-loss Surgery  [email or web address]     Supplements after Weight Loss Surgery  [email or web address]      As I mentioned, you may want to look into the comprehensive medical weight loss program for surgical follow up.    Please let me know if you have any questions.    Thanks,    Gregory Lauren, RD, LD  [facility] Outpatient Dietitian  [phone] (office phone)

## 2022-04-29 NOTE — PROGRESS NOTES
Video-Visit Details    Type of service:  Video Visit    Video Start Time (time video started): 2:30    Video End Time (time video stopped): 3:18    Originating Location (pt. Location): Home    Distant Location (provider location):  [facility] NUTRITION SERVICES     Mode of Communication:  Video Conference via [facility]    OUTPATIENT NUTRITION ASSESSMENT (VIDEO VISIT DUE TO COVID-19)  REASON FOR ASSESSMENT  Durga Flannery referred by Dr. Torres for MNT related to Morbid obesity (H) [E66.01]  - Primary  Patient accompanied by self      ASSESSMENT   Nutrition History:  - Information obtained from patient.  - Patient is on a low carbohydrate/keto diet at home.    Since 2015 lost 125 lbs; gained 25 lbs in the past year  Keto diet  Diet intake connected to stress   VSG in 2015 [place] and lost 70 lbs -moved to [place] in 2020  50 lbs during COVID (275 lbs)     When stressed not exercising or eating well   Limits sweets; limiting rice and bread     Can eat sandwich; 2 tacos   Can eat large salad   No fruit     Diet Recall:  Breakfast: bone broth + vegetable juice + coffee with 1 T half and half; breakfast sandwich   Lunch: meat and vegetables or porkloin and green beans   Dinner: meat and vegetables (low carb tortilla) or tacos (2 fish)   Snack: keto cluster (nuts and pumpkin seeds); popcorn; pistachios; Fiber One brownie bar; keto dessert   Beverages: plain water with lemon and lime; 2 glasses of wine; sparkling water; 20 oz coffee (1-2 T half and half max); 2 times per month chips and cookie from [employer] + single serve dessert  Dining out: varies-usually fish taco     Exercise: Walking 60 minutes 2-5 miles (4-5 times per week); weight lifting     NUTRITION FOCUSED PHYSICAL ASSESSMENT (NFPA) FOR DIAGNOSING MALNUTRITION  Yes         Observed:   No nutrition-related physical findings observed    Obtained from Chart/Interdisciplinary Team:  None noted     LABS  Labs reviewed (discussed lipid  "levels)    MEDICATIONS  Medications reviewed    ANTHROPOMETRICS   Height: 5'5.75\"  Weight: 250 lbs   BMI (kg/m2): 40.6 kg/m2  Weight Status:  Obesity Grade III BMI >40  %IBW: 195%  Weight History:   Wt Readings from Last 10 Encounters:   02/16/22 113.4 kg (250 lb)   07/13/21 111.8 kg (246 lb 6.4 oz)   03/16/21 108.9 kg (240 lb)   01/29/21 107.5 kg (237 lb)   11/06/20 107.4 kg (236 lb 12.8 oz)     ASSESSED NUTRITION NEEDS  Estimated Energy Needs: 3376-5786 kcals/day (11-14 Kcal/Kg)  Justification:  (obese)  Estimated Protein Needs: 58-70 grams protein/day (1-1.2 g pro/Kg)  Justification:  (maintenance)  Estimated Fluid Needs: 7215-7044 mL/day (30-35 mL/kg)    ASSESSED MALNUTRITION STATUS  % Weight Loss:  None noted  % Intake:  No decreased intake noted  Subcutaneous Fat Loss:  None observed  Loss of Muscle Mass:  None observed  Fluid Retention:  None noted    Malnutrition Diagnosis:  Patient does not meet two of the above criteria necessary for diagnosing malnutrition    DIAGNOSIS   Nutrition Diagnosis:  Obese, class III related to excess energy intake as evidenced by current intake and BMI of 40 kg/m2       INTERVENTIONS   Nutrition Prescription   Recommend modified carbohydrate diet for balanced diet and weight loss     IMPLEMENTATION   Assessed learning needs and learning preference  Teaching Method(s) used: Booklet / Handout  Explanation  Vitamin and Mineral Supplements: recommend post surgical sleeve gastrectomy vitamin regimen -complete multivitamin and mineral (2); 2477-8822 mg calcium citrate with vitamin D; 5000 international unit(s) vitamin D daily; 500 mcg B12; 12 mg thiamine daily; Vitron C daily   Diet Education:  Provided education on carbohydrate counting, weight loss diet   Nutrition Education (Content):   a)  Discussed portion sizes, label reading, carbohydrate counting, saturated fat, exercise and diet modification.  Instructed patient on label reading using label from home (parmesean crisps).  " Encouraged patient to expand whole food choices.  Congratulated patient on weight loss success since 2015.  Reviewed sleeve gastrectomy diet and avoidance of liquids during meals.  Suggest patient begin post surgery vitamin and mineral regimen.  Supported patient with the challenge of diet changes during stress.    b)  Provided Diet Guidelines after Weight loss surgery and vitamin and mineral list post surgery   Nutrition Education (Application):   a)  Discussed tracking apps. Recommend tracking 2 days during the week and one day on the weekend.  Adjust calorie level to 0553-9031 calories per day with 40% carbohydrate.     b)  Patient verbalizes understanding of diet by stating will focus on balanced carbohydrate intake.   Expected patient engagement: good  Other: Recommend comprehensive medical weight loss program for surgery follow up and assessment for weight loss medications.     GOALS  Balanced carbohydrate intake (30 grams per meal)   Focus on whole foods (fruit) as snacks vs processed foods  Track intake 2 days during the week and 2 day on the weekend     FOLLOW UP/MONITORING   Progress towards goals will be monitored and evaluated per protocol and Practice Guidelines  Patient to call if desires further follow up or if has questions   RD name and number provided     Time Spent with Patient  48 minutes    Gregory Kessler, RD, LD  [facility] Outpatient Dietitian  [phone] (office phone)

## 2022-05-14 DIAGNOSIS — F41.0 PANIC ATTACK: ICD-10-CM

## 2022-05-16 RX ORDER — PROPRANOLOL HYDROCHLORIDE 10 MG/1
10 TABLET ORAL 3 TIMES DAILY PRN
Qty: 270 TABLET | Refills: 0 | Status: SHIPPED | OUTPATIENT
Start: 2022-05-16 | End: 2023-10-26

## 2022-05-16 NOTE — TELEPHONE ENCOUNTER
Beta-Blockers Protocol Passed 05/14/2022 01:30 PM   Protocol Details  Blood pressure under 140/90 in past 12 months    Patient is age 6 or older    Recent (12 mo) or future (30 days) visit within the authorizing provider's specialty    Medication is active on med list

## 2022-07-27 ENCOUNTER — OFFICE VISIT (OUTPATIENT)
Dept: MIDWIFE SERVICES | Facility: CLINIC | Age: 37
End: 2022-07-27
Payer: COMMERCIAL

## 2022-07-27 VITALS
DIASTOLIC BLOOD PRESSURE: 96 MMHG | WEIGHT: 258 LBS | TEMPERATURE: 97.6 F | BODY MASS INDEX: 41.96 KG/M2 | SYSTOLIC BLOOD PRESSURE: 149 MMHG | HEART RATE: 82 BPM

## 2022-07-27 DIAGNOSIS — Z11.3 ROUTINE SCREENING FOR STI (SEXUALLY TRANSMITTED INFECTION): Primary | ICD-10-CM

## 2022-07-27 DIAGNOSIS — R03.0 ELEVATED BLOOD PRESSURE READING WITHOUT DIAGNOSIS OF HYPERTENSION: ICD-10-CM

## 2022-07-27 DIAGNOSIS — B96.89 BACTERIAL VAGINOSIS: ICD-10-CM

## 2022-07-27 DIAGNOSIS — N76.0 BACTERIAL VAGINOSIS: ICD-10-CM

## 2022-07-27 DIAGNOSIS — N36.8 CYST OF SKENE'S GLAND: ICD-10-CM

## 2022-07-27 PROCEDURE — 36415 COLL VENOUS BLD VENIPUNCTURE: CPT | Performed by: ADVANCED PRACTICE MIDWIFE

## 2022-07-27 PROCEDURE — 86780 TREPONEMA PALLIDUM: CPT | Performed by: ADVANCED PRACTICE MIDWIFE

## 2022-07-27 PROCEDURE — 86803 HEPATITIS C AB TEST: CPT | Performed by: ADVANCED PRACTICE MIDWIFE

## 2022-07-27 PROCEDURE — 87340 HEPATITIS B SURFACE AG IA: CPT | Performed by: ADVANCED PRACTICE MIDWIFE

## 2022-07-27 PROCEDURE — 87591 N.GONORRHOEAE DNA AMP PROB: CPT | Performed by: ADVANCED PRACTICE MIDWIFE

## 2022-07-27 PROCEDURE — 87389 HIV-1 AG W/HIV-1&-2 AB AG IA: CPT | Performed by: ADVANCED PRACTICE MIDWIFE

## 2022-07-27 PROCEDURE — 99203 OFFICE O/P NEW LOW 30 MIN: CPT | Performed by: ADVANCED PRACTICE MIDWIFE

## 2022-07-27 PROCEDURE — 87210 SMEAR WET MOUNT SALINE/INK: CPT | Performed by: ADVANCED PRACTICE MIDWIFE

## 2022-07-27 PROCEDURE — 87491 CHLMYD TRACH DNA AMP PROBE: CPT | Performed by: ADVANCED PRACTICE MIDWIFE

## 2022-07-27 RX ORDER — METRONIDAZOLE 500 MG/1
500 TABLET ORAL 2 TIMES DAILY
Qty: 14 TABLET | Refills: 0 | Status: SHIPPED | OUTPATIENT
Start: 2022-07-27 | End: 2022-08-04

## 2022-07-27 ASSESSMENT — ANXIETY QUESTIONNAIRES
IF YOU CHECKED OFF ANY PROBLEMS ON THIS QUESTIONNAIRE, HOW DIFFICULT HAVE THESE PROBLEMS MADE IT FOR YOU TO DO YOUR WORK, TAKE CARE OF THINGS AT HOME, OR GET ALONG WITH OTHER PEOPLE: SOMEWHAT DIFFICULT
2. NOT BEING ABLE TO STOP OR CONTROL WORRYING: MORE THAN HALF THE DAYS
3. WORRYING TOO MUCH ABOUT DIFFERENT THINGS: MORE THAN HALF THE DAYS
7. FEELING AFRAID AS IF SOMETHING AWFUL MIGHT HAPPEN: MORE THAN HALF THE DAYS
1. FEELING NERVOUS, ANXIOUS, OR ON EDGE: MORE THAN HALF THE DAYS
GAD7 TOTAL SCORE: 13
5. BEING SO RESTLESS THAT IT IS HARD TO SIT STILL: MORE THAN HALF THE DAYS
GAD7 TOTAL SCORE: 13
6. BECOMING EASILY ANNOYED OR IRRITABLE: SEVERAL DAYS

## 2022-07-27 ASSESSMENT — PATIENT HEALTH QUESTIONNAIRE - PHQ9
5. POOR APPETITE OR OVEREATING: MORE THAN HALF THE DAYS
SUM OF ALL RESPONSES TO PHQ QUESTIONS 1-9: 11

## 2022-07-27 NOTE — PROGRESS NOTES
S:  Durga is here for routine STI testing. She also has [place]'s gland cyst that was diagnosed in February 2022 that is still present. She took antibiotics and cyst did not resolve. She has not tried warm compresses. Cyst is nontender and doesn't bother her. It has not changed in size.    O:  BP (!) 149/96   Pulse 82   Temp 97.6  F (36.4  C)   Wt 117 kg (258 lb)   Breastfeeding No   BMI 41.96 kg/m    Pelvic Exam:  Vulva: [place]'s gland cyst on left labia minora, 2mm in size, able to express small amount of fluid; normal hair distribution, no adenopathy  Vagina: Moist, pink, discharge is thin and white, well rugated, no lesions    A/P:  (Z11.3) Routine screening for STI (sexually transmitted infection)  (primary encounter diagnosis)  Plan: Chlamydia trachomatis PCR - Clinic Collect,         Neisseria gonorrhoeae PCR - Clinic Collect,         Treponema Abs w Reflex to RPR and Titer,         Hepatitis B surface antigen, Hepatitis C         antibody, HIV Antigen Antibody Combo, Wet prep         - Clinic Collect    (N36.8) Cyst of [place]'s gland  Comment: Discussed warm compresses. If cyst is not growing in size of presenting with tenderness or other signs of infection, okay to monitor.     (R03.0) Elevated blood pressure reading without diagnosis of hypertension  Comment: Encouraged to follow up with family practice.    (N76.0,  B96.89) Bacterial vaginosis  Plan: metroNIDAZOLE (FLAGYL) 500 MG tablet    ORAL Osoroi CNM

## 2022-07-28 LAB
C TRACH DNA SPEC QL NAA+PROBE: NEGATIVE
HBV SURFACE AG SERPL QL IA: NONREACTIVE
HCV AB SERPL QL IA: NONREACTIVE
HIV 1+2 AB+HIV1 P24 AG SERPL QL IA: NONREACTIVE
N GONORRHOEA DNA SPEC QL NAA+PROBE: NEGATIVE
T PALLIDUM AB SER QL: NONREACTIVE

## 2022-08-01 ENCOUNTER — OFFICE VISIT (OUTPATIENT)
Dept: URGENT CARE | Facility: URGENT CARE | Age: 37
End: 2022-08-01
Payer: COMMERCIAL

## 2022-08-01 VITALS
WEIGHT: 255 LBS | HEART RATE: 99 BPM | DIASTOLIC BLOOD PRESSURE: 84 MMHG | TEMPERATURE: 98.6 F | SYSTOLIC BLOOD PRESSURE: 132 MMHG | HEIGHT: 66 IN | OXYGEN SATURATION: 98 % | BODY MASS INDEX: 40.98 KG/M2 | RESPIRATION RATE: 15 BRPM

## 2022-08-01 DIAGNOSIS — B96.89 BV (BACTERIAL VAGINOSIS): Primary | ICD-10-CM

## 2022-08-01 DIAGNOSIS — R11.0 NAUSEA: ICD-10-CM

## 2022-08-01 DIAGNOSIS — R51.9 ACUTE NONINTRACTABLE HEADACHE, UNSPECIFIED HEADACHE TYPE: ICD-10-CM

## 2022-08-01 DIAGNOSIS — N76.0 BV (BACTERIAL VAGINOSIS): Primary | ICD-10-CM

## 2022-08-01 LAB
BASOPHILS # BLD AUTO: 0 10E3/UL (ref 0–0.2)
BASOPHILS NFR BLD AUTO: 0 %
EOSINOPHIL # BLD AUTO: 0.1 10E3/UL (ref 0–0.7)
EOSINOPHIL NFR BLD AUTO: 1 %
ERYTHROCYTE [DISTWIDTH] IN BLOOD BY AUTOMATED COUNT: 12.4 % (ref 10–15)
HCT VFR BLD AUTO: 43.4 % (ref 35–47)
HGB BLD-MCNC: 14.4 G/DL (ref 11.7–15.7)
IMM GRANULOCYTES # BLD: 0 10E3/UL
IMM GRANULOCYTES NFR BLD: 0 %
LYMPHOCYTES # BLD AUTO: 0.7 10E3/UL (ref 0.8–5.3)
LYMPHOCYTES NFR BLD AUTO: 14 %
MCH RBC QN AUTO: 30.3 PG (ref 26.5–33)
MCHC RBC AUTO-ENTMCNC: 33.2 G/DL (ref 31.5–36.5)
MCV RBC AUTO: 91 FL (ref 78–100)
MONOCYTES # BLD AUTO: 0.6 10E3/UL (ref 0–1.3)
MONOCYTES NFR BLD AUTO: 12 %
NEUTROPHILS # BLD AUTO: 3.9 10E3/UL (ref 1.6–8.3)
NEUTROPHILS NFR BLD AUTO: 73 %
PLATELET # BLD AUTO: 247 10E3/UL (ref 150–450)
RBC # BLD AUTO: 4.75 10E6/UL (ref 3.8–5.2)
WBC # BLD AUTO: 5.3 10E3/UL (ref 4–11)

## 2022-08-01 PROCEDURE — 85025 COMPLETE CBC W/AUTO DIFF WBC: CPT | Performed by: PHYSICIAN ASSISTANT

## 2022-08-01 PROCEDURE — 36415 COLL VENOUS BLD VENIPUNCTURE: CPT | Performed by: PHYSICIAN ASSISTANT

## 2022-08-01 PROCEDURE — 99213 OFFICE O/P EST LOW 20 MIN: CPT | Performed by: PHYSICIAN ASSISTANT

## 2022-08-01 NOTE — PROGRESS NOTES
BV (bacterial vaginosis)  - clindamycin (CLEOCIN 1 DOSE) 2 % vaginal cream; Place 1 applicator vaginally daily for 5 days    Patient was asked to discontinue metronidazole.    Nausea  - CBC with platelets and differential; Future  - CBC with platelets and differential    Acute nonintractable headache, unspecified headache type  - CBC with platelets and differential; Future  - CBC with platelets and differential    Tre Clayton PA-C  [facility] URGENT CARE    Subjective   37 year old who presents to clinic today for the following health issues:    Urgent Care and Medication Problem       HPI     Patient was taking flagyl for bacterial vaginosis for the past 5 days. Patient states that she began to have a headache and nausea since then. Patient states that she has been having some stiff neck since then as well.     Review of Systems   Review of Systems   See HPI     Objective    Temp: 98.6  F (37  C) Temp src: Temporal BP: 132/84 Pulse: 99   Resp: 15 SpO2: 98 %       Physical Exam   Physical Exam  Constitutional:       General: She is not in acute distress.     Appearance: Normal appearance. She is normal weight. She is not ill-appearing, toxic-appearing or diaphoretic.   HENT:      Head: Normocephalic and atraumatic.   Cardiovascular:      Rate and Rhythm: Normal rate.      Pulses: Normal pulses.   Pulmonary:      Effort: Pulmonary effort is normal. No respiratory distress.   Neurological:      General: No focal deficit present.      Mental Status: She is alert and oriented to person, place, and time. Mental status is at baseline.   Psychiatric:         Mood and Affect: Mood normal.         Behavior: Behavior normal.         Thought Content: Thought content normal.         Judgment: Judgment normal.          Results for orders placed or performed in visit on 08/01/22 (from the past 24 hour(s))   CBC with platelets and differential    Narrative    The following orders were created for panel order CBC with  platelets and differential.  Procedure                               Abnormality         Status                     ---------                               -----------         ------                     CBC with platelets and d...[[id number]]  Abnormal            Final result                 Please view results for these tests on the individual orders.   CBC with platelets and differential   Result Value Ref Range    WBC Count 5.3 4.0 - 11.0 10e3/uL    RBC Count 4.75 3.80 - 5.20 10e6/uL    Hemoglobin 14.4 11.7 - 15.7 g/dL    Hematocrit 43.4 35.0 - 47.0 %    MCV 91 78 - 100 fL    MCH 30.3 26.5 - 33.0 pg    MCHC 33.2 31.5 - 36.5 g/dL    RDW 12.4 10.0 - 15.0 %    Platelet Count 247 150 - 450 10e3/uL    % Neutrophils 73 %    % Lymphocytes 14 %    % Monocytes 12 %    % Eosinophils 1 %    % Basophils 0 %    % Immature Granulocytes 0 %    Absolute Neutrophils 3.9 1.6 - 8.3 10e3/uL    Absolute Lymphocytes 0.7 (L) 0.8 - 5.3 10e3/uL    Absolute Monocytes 0.6 0.0 - 1.3 10e3/uL    Absolute Eosinophils 0.1 0.0 - 0.7 10e3/uL    Absolute Basophils 0.0 0.0 - 0.2 10e3/uL    Absolute Immature Granulocytes 0.0 <=0.4 10e3/uL

## 2022-08-04 ENCOUNTER — OFFICE VISIT (OUTPATIENT)
Dept: URGENT CARE | Facility: URGENT CARE | Age: 37
End: 2022-08-04
Payer: COMMERCIAL

## 2022-08-04 VITALS
HEART RATE: 83 BPM | TEMPERATURE: 98.1 F | SYSTOLIC BLOOD PRESSURE: 150 MMHG | DIASTOLIC BLOOD PRESSURE: 102 MMHG | OXYGEN SATURATION: 100 % | HEIGHT: 66 IN | BODY MASS INDEX: 41.35 KG/M2 | WEIGHT: 257.3 LBS

## 2022-08-04 DIAGNOSIS — J30.1 SEASONAL ALLERGIC RHINITIS DUE TO POLLEN: Primary | ICD-10-CM

## 2022-08-04 DIAGNOSIS — M54.2 NECK PAIN: ICD-10-CM

## 2022-08-04 DIAGNOSIS — Z20.822 SUSPECTED 2019 NOVEL CORONAVIRUS INFECTION: ICD-10-CM

## 2022-08-04 LAB — SARS-COV-2 RNA RESP QL NAA+PROBE: NEGATIVE

## 2022-08-04 PROCEDURE — 99213 OFFICE O/P EST LOW 20 MIN: CPT | Mod: CS | Performed by: EMERGENCY MEDICINE

## 2022-08-04 PROCEDURE — U0003 INFECTIOUS AGENT DETECTION BY NUCLEIC ACID (DNA OR RNA); SEVERE ACUTE RESPIRATORY SYNDROME CORONAVIRUS 2 (SARS-COV-2) (CORONAVIRUS DISEASE [COVID-19]), AMPLIFIED PROBE TECHNIQUE, MAKING USE OF HIGH THROUGHPUT TECHNOLOGIES AS DESCRIBED BY CMS-2020-01-R: HCPCS | Performed by: EMERGENCY MEDICINE

## 2022-08-04 PROCEDURE — U0005 INFEC AGEN DETEC AMPLI PROBE: HCPCS | Performed by: EMERGENCY MEDICINE

## 2022-08-04 RX ORDER — CETIRIZINE HYDROCHLORIDE 10 MG/1
10 TABLET ORAL DAILY
Qty: 30 TABLET | Refills: 0 | Status: SHIPPED | OUTPATIENT
Start: 2022-08-04 | End: 2023-02-19

## 2022-08-04 RX ORDER — LIDOCAINE 4 G/G
1 PATCH TOPICAL EVERY 24 HOURS
Qty: 10 PATCH | Refills: 0 | Status: SHIPPED | OUTPATIENT
Start: 2022-08-04 | End: 2022-09-30

## 2022-08-04 RX ORDER — FLUTICASONE PROPIONATE 50 MCG
1 SPRAY, SUSPENSION (ML) NASAL DAILY
Qty: 11.1 ML | Refills: 0 | Status: SHIPPED | OUTPATIENT
Start: 2022-08-04 | End: 2022-08-09

## 2022-08-04 NOTE — PROGRESS NOTES
Assessment & Plan     Diagnosis:    (J30.1) Seasonal allergic rhinitis due to pollen  (primary encounter diagnosis)  Plan: fluticasone (FLONASE) 50 MCG/ACT nasal spray,         cetirizine (ZYRTEC) 10 MG tablet    (Z20.822) Suspected 2019 novel coronavirus infection    (M54.2) Neck pain  Plan: Lidocaine (LIDOCARE) 4 % Patch      Medical Decision Making:  Durga Flannery is a 37 year old female who presents for evaluation of nasal discharge, ear pain, facial/nasal congestion and pain, neck pain. No fevers or known sick contacts. On exam the patient has swollen nasal mucosa with clear discharge.  Patient notes history of seasonal allergies; has not been using any allergy medications recently.    There is no signs at this point of serious bacterial infection such as OM, RPA, epiglottitis, PTA, strep pharyngitis, pneumonia, meningitis.     Given history and exam, I feel the most likely etiology is allergic rhinitis.  Will start medication as noted above for symptoms. Doubt bacterial etiology. Doubt intracerebral issues.  Certainly a viral syndrome is also a possibility.     Follow up with PCP in 2-3 days as needed. All questions answered.      Naresh Narayanan PA-C  [facility]    Subjective     Durga Flannery is a 37 year old female who presents with  to clinic today for the following health issues:  Chief Complaint   Patient presents with     Ear Pressure     Pressure in both ears but worst in the left.     Headache     Headache in the back of her head.     Post Nasal Drip       HPI    Onset of symptoms was 2 day(s) ago.    Course of illness is worsening.    Severity moderate  Current and Associated symptoms: runny nose, stuffy nose, ear pain bilateral, facial pain/pressure, headache, body aches and neck pain/stiffness.  Denies fever, wheezing, shortness of breath, hoarse voice, vomiting, diarrhea, not eating, not sleeping well, chest pain, vision changes, severe headaches, IVDU.  Treatment measures  "tried include Tylenol/Ibuprofen yesterday  Predisposing factors include seasonal allergies -has not taken medications for a few years.    No concerns for wheezing, shortness of breath, vomiting or difficulties swallowing food/fluids at this time.     Review of Systems    See HPI    Objective      Vitals: BP (!) 150/102 (BP Location: Left arm, Patient Position: Sitting, Cuff Size: Adult Large)   Pulse 83   Temp 98.1  F (36.7  C) (Tympanic)   Ht 1.676 m (5' 6\")   Wt 116.7 kg (257 lb 4.8 oz)   LMP 07/18/2022   SpO2 100%   BMI 41.53 kg/m        Patient Vitals for the past 24 hrs:   BP Temp Temp src Pulse SpO2 Height Weight   08/04/22 1415 (!) 150/102 98.1  F (36.7  C) Tympanic 83 100 % 1.676 m (5' 6\") 116.7 kg (257 lb 4.8 oz)       Vital signs reviewed by: Naresh Narayanan PA-C    Physical Exam   Constitutional: Alert and active. Non-toxic appearing.  No acute distress.  HENT:   Right Ear: External ear normal. TM: pale/grey  Left Ear: External ear normal. TM: pale/grey; slightly bulging with air-fluid level noted.  Nose: Nasal turbinates are boggy and slightly erythematous. Clear rhinorrhea noted. No septal masses or epistaxis noted.  Eyes: Conjunctivae, EOM are normal. Eyes-lids are normal.  Mouth: Mucous membranes are moist. Posterior oropharynx is clear. No exudates. Normal tongue and tonsil.  Neck: Normal ROM. No meningismus.  Cardiovascular: Regular rate and rhythm  Pulmonary/Chest: Effort normal. No respiratory distress. Lungs are clear to auscultation throughout.  Musculoskeletal: Normal range of motion.   Neurological: Alert and oriented x3.  Skin: No rash noted on visualized skin.       Naresh Narayanan PA-C, August 4, 2022                        "

## 2022-08-12 ENCOUNTER — MYC MEDICAL ADVICE (OUTPATIENT)
Dept: FAMILY MEDICINE | Facility: CLINIC | Age: 37
End: 2022-08-12

## 2022-08-15 ENCOUNTER — E-VISIT (OUTPATIENT)
Dept: FAMILY MEDICINE | Facility: CLINIC | Age: 37
End: 2022-08-15
Payer: COMMERCIAL

## 2022-08-15 DIAGNOSIS — Z71.85 IMMUNIZATION COUNSELING: Primary | ICD-10-CM

## 2022-08-15 PROCEDURE — 99421 OL DIG E/M SVC 5-10 MIN: CPT | Performed by: FAMILY MEDICINE

## 2022-09-06 ASSESSMENT — ANXIETY QUESTIONNAIRES
8. IF YOU CHECKED OFF ANY PROBLEMS, HOW DIFFICULT HAVE THESE MADE IT FOR YOU TO DO YOUR WORK, TAKE CARE OF THINGS AT HOME, OR GET ALONG WITH OTHER PEOPLE?: SOMEWHAT DIFFICULT
3. WORRYING TOO MUCH ABOUT DIFFERENT THINGS: NEARLY EVERY DAY
1. FEELING NERVOUS, ANXIOUS, OR ON EDGE: NEARLY EVERY DAY
GAD7 TOTAL SCORE: 19
4. TROUBLE RELAXING: NEARLY EVERY DAY
5. BEING SO RESTLESS THAT IT IS HARD TO SIT STILL: NEARLY EVERY DAY
IF YOU CHECKED OFF ANY PROBLEMS ON THIS QUESTIONNAIRE, HOW DIFFICULT HAVE THESE PROBLEMS MADE IT FOR YOU TO DO YOUR WORK, TAKE CARE OF THINGS AT HOME, OR GET ALONG WITH OTHER PEOPLE: SOMEWHAT DIFFICULT
GAD7 TOTAL SCORE: 19
7. FEELING AFRAID AS IF SOMETHING AWFUL MIGHT HAPPEN: NEARLY EVERY DAY
GAD7 TOTAL SCORE: 19
7. FEELING AFRAID AS IF SOMETHING AWFUL MIGHT HAPPEN: NEARLY EVERY DAY
2. NOT BEING ABLE TO STOP OR CONTROL WORRYING: NEARLY EVERY DAY
6. BECOMING EASILY ANNOYED OR IRRITABLE: SEVERAL DAYS

## 2022-09-12 ENCOUNTER — VIRTUAL VISIT (OUTPATIENT)
Dept: PSYCHOLOGY | Facility: CLINIC | Age: 37
End: 2022-09-12
Payer: COMMERCIAL

## 2022-09-12 ENCOUNTER — DOCUMENTATION ONLY (OUTPATIENT)
Dept: PSYCHOLOGY | Facility: CLINIC | Age: 37
End: 2022-09-12

## 2022-09-12 DIAGNOSIS — F43.21 GRIEF REACTION: Primary | ICD-10-CM

## 2022-09-12 PROCEDURE — 90837 PSYTX W PT 60 MINUTES: CPT | Mod: 95 | Performed by: SOCIAL WORKER

## 2022-09-12 NOTE — PROGRESS NOTES
[facility]   Mental Health & Addiction Services     Progress Note - Initial Visit    Patient  Name:  Durga Flannery Date: 2022         Service Type: Individual     Visit Start Time: 10:05  Visit End Time: 10:58    Visit #: 1    Attendees: Client    Service Modality:  Video Visit:      Provider verified identity through the following two step process.  Patient provided:  Patient  and Patient address    Telemedicine Visit: The patient's condition can be safely assessed and treated via synchronous audio and visual telemedicine encounter.      Reason for Telemedicine Visit: Services only offered telehealth    Originating Site (Patient Location): Patient's home    Distant Site (Provider Location): Provider Remote Setting    Consent:  The patient/guardian has verbally consented to: the potential risks and benefits of telemedicine (video visit) versus in person care; bill my insurance or make self-payment for services provided; and responsibility for payment of non-covered services.     Patient would like the video invitation sent by:  My Chart    Mode of Communication:  Video Conference via Amwell    As the provider I attest to compliance with applicable laws and regulations related to telemedicine.    DATA:   Interactive Complexity: No   Crisis: No     Presenting Concerns/  Current Stressors: Patient presented with a deep grief. On , her best friend/boy friend passed away. Patient did not know that he has passed away until . She has noted he has not been on whatsup for weeks. She only found out that he has passed away when she discovered his obituary in the news paper. Patient expressed sadness especially feels very guilty because she had not seen him and he has called her some times like two weeks before he passed. He wanted to see her. Though he was not sick and the patient could not leave work to travel to see him. He was a  who has been successful and traveling more  "often for his job. At age 43, he was not  but had been in some relationship. He and patient had had some break in their relationship which was due to the fact the patient wanted their relationship get more serious. Wished to introduce boyfriend to the the family. Wished to have a long term relationship. She had told him that off and on is not good. They have known each other since January 2018. They broke up in December 2020. the once again reconnect in 7/2021. He then freddie to [facility] to return in April. They got to see each other. Patient shared she does not know how he passed but she suspects he must have done SA.She remembers two weeks. Before he passed, he called her and told her he loved her. She told him she loved him as well. He told her \" I won't bother you anymore. Take care, good bye\" patinet did not know what this meant, did not realize that it was it. Patient tried to contact his mother to present him condoeances. She has not replied to her yet. This also makes her very sad.Patient notes she did not know him but she suspects he might had some problems. She notes sleep and appetite are better now. She also notes still the guilt feeling, fear, some panic attack feelings in the stomach like \" I can't move \", no energy. She is here because \" I don't want to feel the way I do. I want to think about good times not just this pain from the news\"    ASSESSMENT:  Mental Status Assessment:  Appearance:   Appropriate   Eye Contact:   Good   Psychomotor Behavior: Normal   Attitude:   Cooperative   Orientation:   Person Place Time Situation  Speech   Rate / Production: Normal/ Responsive   Volume:  Normal   Mood:    Sad   Affect:    Tearful   Thought Content:  Clear   Thought Form:  Coherent  Logical   Insight:    Good     Safety Issues and Plan for Safety and Risk Management:   [place] Suicide Severity Rating Scale (Lifetime/Recent)No flowsheet data found.  Patient denies current fears or " "concerns for personal safety.  Patient denies current or recent suicidal ideation or behaviors.  Patient denies current or recent homicidal ideation or behaviors.  Patient denies current or recent self injurious behavior or ideation.  Patient denies other safety concerns.  Recommended that patient call 911 or go to the local ED should there be a change in any of these risk factors.  Patient reports there are no firearms in the house.     Diagnostic Criteria:  fatigue, low energy, panic feelings, crying, feeling of guilt, fear, over all so emotional.     DSM5 Diagnoses: (Sustained by DSM5 Criteria Listed Above)  Diagnoses: Grief reaction   Psychosocial & Contextual Factors: \"Loss of a very close person in my life\"  WHODAS 2.0 (12 item):   WHODAS 2.0 Total Score 9/6/2022   Total Score 30   Total Score MyChart 30     Intervention:    AIDET performed. working rapport began. diagnostic assessment was initiated.   Collateral Reports Completed:  Not Applicable    PLAN: (Homework, other):  1. Provider will continue Diagnostic Assessment.  Patient was given the following to do until next session:  Continues to be active, to communicate with others that are positive, and increase self care    2. Provider recommended the following referrals: None discussed today.      3.  Suicide Risk and Safety Concerns were assessed for Durga Prudencio: no safety concerned reported.    LEOBARDO Toney   September 12, 2022      Answers for HPI/ROS submitted by the patient on 9/6/2022  DAYNE 7 TOTAL SCORE: 19      "

## 2022-09-13 ENCOUNTER — MYC MEDICAL ADVICE (OUTPATIENT)
Dept: MIDWIFE SERVICES | Facility: CLINIC | Age: 37
End: 2022-09-13

## 2022-09-13 DIAGNOSIS — Z11.3 ROUTINE SCREENING FOR STI (SEXUALLY TRANSMITTED INFECTION): Primary | ICD-10-CM

## 2022-09-13 NOTE — TELEPHONE ENCOUNTER
Pt last seen in office 7/27/22 for STI screening and [place]'s gland cyst.    Pt tested neg for all screening. She is equesting future orders for STI testing to be completed in October.    Routing to provider for review.    Shayy Ojeda RN on 9/13/2022 at 11:20 AM

## 2022-09-24 ENCOUNTER — HEALTH MAINTENANCE LETTER (OUTPATIENT)
Age: 37
End: 2022-09-24

## 2022-09-29 ENCOUNTER — VIRTUAL VISIT (OUTPATIENT)
Dept: PSYCHOLOGY | Facility: CLINIC | Age: 37
End: 2022-09-29
Payer: COMMERCIAL

## 2022-09-29 DIAGNOSIS — F43.21 GRIEF REACTION: Primary | ICD-10-CM

## 2022-09-29 PROCEDURE — 90791 PSYCH DIAGNOSTIC EVALUATION: CPT | Mod: 95 | Performed by: SOCIAL WORKER

## 2022-09-29 ASSESSMENT — COLUMBIA-SUICIDE SEVERITY RATING SCALE - C-SSRS
2. HAVE YOU ACTUALLY HAD ANY THOUGHTS OF KILLING YOURSELF?: NO
TOTAL  NUMBER OF ABORTED OR SELF INTERRUPTED ATTEMPTS SINCE LAST CONTACT: NO
6. HAVE YOU EVER DONE ANYTHING, STARTED TO DO ANYTHING, OR PREPARED TO DO ANYTHING TO END YOUR LIFE?: NO
TOTAL  NUMBER OF INTERRUPTED ATTEMPTS SINCE LAST CONTACT: NO
ATTEMPT SINCE LAST CONTACT: NO
1. SINCE LAST CONTACT, HAVE YOU WISHED YOU WERE DEAD OR WISHED YOU COULD GO TO SLEEP AND NOT WAKE UP?: NO
SUICIDE, SINCE LAST CONTACT: NO

## 2022-09-30 ENCOUNTER — OFFICE VISIT (OUTPATIENT)
Dept: FAMILY MEDICINE | Facility: CLINIC | Age: 37
End: 2022-09-30
Payer: COMMERCIAL

## 2022-09-30 VITALS
DIASTOLIC BLOOD PRESSURE: 90 MMHG | HEART RATE: 74 BPM | TEMPERATURE: 97.3 F | RESPIRATION RATE: 18 BRPM | BODY MASS INDEX: 41.5 KG/M2 | SYSTOLIC BLOOD PRESSURE: 128 MMHG | HEIGHT: 66 IN | WEIGHT: 258.2 LBS | OXYGEN SATURATION: 100 %

## 2022-09-30 DIAGNOSIS — Z11.3 ROUTINE SCREENING FOR STI (SEXUALLY TRANSMITTED INFECTION): ICD-10-CM

## 2022-09-30 DIAGNOSIS — E66.01 MORBID OBESITY (H): ICD-10-CM

## 2022-09-30 DIAGNOSIS — Z01.84 IMMUNITY STATUS TESTING: ICD-10-CM

## 2022-09-30 DIAGNOSIS — R03.0 ELEVATED BLOOD PRESSURE READING WITHOUT DIAGNOSIS OF HYPERTENSION: ICD-10-CM

## 2022-09-30 DIAGNOSIS — Z90.3 HISTORY OF SLEEVE GASTRECTOMY: ICD-10-CM

## 2022-09-30 DIAGNOSIS — Z23 NEED FOR PROPHYLACTIC VACCINATION AND INOCULATION AGAINST INFLUENZA: ICD-10-CM

## 2022-09-30 DIAGNOSIS — F41.0 PANIC ATTACK: ICD-10-CM

## 2022-09-30 DIAGNOSIS — Z86.39 HISTORY OF THYROID NODULE: ICD-10-CM

## 2022-09-30 DIAGNOSIS — Z23 NEED FOR COVID-19 VACCINE: ICD-10-CM

## 2022-09-30 DIAGNOSIS — Z71.85 VACCINE COUNSELING: Primary | ICD-10-CM

## 2022-09-30 DIAGNOSIS — F41.1 GENERALIZED ANXIETY DISORDER: ICD-10-CM

## 2022-09-30 DIAGNOSIS — F32.1 CURRENT MODERATE EPISODE OF MAJOR DEPRESSIVE DISORDER, UNSPECIFIED WHETHER RECURRENT (H): ICD-10-CM

## 2022-09-30 LAB
CLUE CELLS: ABNORMAL
FERRITIN SERPL-MCNC: 11 NG/ML (ref 12–150)
IRON SATN MFR SERPL: 13 % (ref 15–46)
IRON SERPL-MCNC: 42 UG/DL (ref 35–180)
TIBC SERPL-MCNC: 323 UG/DL (ref 240–430)
TRICHOMONAS, WET PREP: ABNORMAL
VIT B12 SERPL-MCNC: 579 PG/ML (ref 232–1245)
WBC'S/HIGH POWER FIELD, WET PREP: ABNORMAL
YEAST, WET PREP: ABNORMAL

## 2022-09-30 PROCEDURE — 0124A COVID-19,PF,PFIZER BOOSTER BIVALENT: CPT | Performed by: FAMILY MEDICINE

## 2022-09-30 PROCEDURE — 87210 SMEAR WET MOUNT SALINE/INK: CPT | Performed by: FAMILY MEDICINE

## 2022-09-30 PROCEDURE — 36415 COLL VENOUS BLD VENIPUNCTURE: CPT | Mod: 90 | Performed by: FAMILY MEDICINE

## 2022-09-30 PROCEDURE — 90686 IIV4 VACC NO PRSV 0.5 ML IM: CPT | Performed by: FAMILY MEDICINE

## 2022-09-30 PROCEDURE — 87340 HEPATITIS B SURFACE AG IA: CPT | Performed by: FAMILY MEDICINE

## 2022-09-30 PROCEDURE — 86803 HEPATITIS C AB TEST: CPT | Performed by: FAMILY MEDICINE

## 2022-09-30 PROCEDURE — 83550 IRON BINDING TEST: CPT | Performed by: FAMILY MEDICINE

## 2022-09-30 PROCEDURE — 82728 ASSAY OF FERRITIN: CPT | Performed by: FAMILY MEDICINE

## 2022-09-30 PROCEDURE — 87389 HIV-1 AG W/HIV-1&-2 AB AG IA: CPT | Performed by: FAMILY MEDICINE

## 2022-09-30 PROCEDURE — 91312 COVID-19,PF,PFIZER BOOSTER BIVALENT: CPT | Performed by: FAMILY MEDICINE

## 2022-09-30 PROCEDURE — 99000 SPECIMEN HANDLING OFFICE-LAB: CPT | Performed by: FAMILY MEDICINE

## 2022-09-30 PROCEDURE — 86658 ENTEROVIRUS ANTIBODY: CPT | Mod: 90 | Performed by: FAMILY MEDICINE

## 2022-09-30 PROCEDURE — 82306 VITAMIN D 25 HYDROXY: CPT | Performed by: FAMILY MEDICINE

## 2022-09-30 PROCEDURE — 99215 OFFICE O/P EST HI 40 MIN: CPT | Mod: 25 | Performed by: FAMILY MEDICINE

## 2022-09-30 PROCEDURE — 90471 IMMUNIZATION ADMIN: CPT | Performed by: FAMILY MEDICINE

## 2022-09-30 PROCEDURE — 86780 TREPONEMA PALLIDUM: CPT | Performed by: FAMILY MEDICINE

## 2022-09-30 PROCEDURE — 82607 VITAMIN B-12: CPT | Performed by: FAMILY MEDICINE

## 2022-09-30 ASSESSMENT — ENCOUNTER SYMPTOMS
WEAKNESS: 0
JOINT SWELLING: 0
FEVER: 0
HEARTBURN: 0
DYSURIA: 0
HEADACHES: 0
COUGH: 0
MYALGIAS: 0
NERVOUS/ANXIOUS: 1
PARESTHESIAS: 0
DIZZINESS: 0
SHORTNESS OF BREATH: 0
NAUSEA: 0
PALPITATIONS: 0
BREAST MASS: 0
FREQUENCY: 0
EYE PAIN: 0
SORE THROAT: 0
ABDOMINAL PAIN: 0
HEMATURIA: 0
CONSTIPATION: 0
ARTHRALGIAS: 0
DIARRHEA: 0
CHILLS: 0
HEMATOCHEZIA: 0

## 2022-09-30 ASSESSMENT — ANXIETY QUESTIONNAIRES
IF YOU CHECKED OFF ANY PROBLEMS ON THIS QUESTIONNAIRE, HOW DIFFICULT HAVE THESE PROBLEMS MADE IT FOR YOU TO DO YOUR WORK, TAKE CARE OF THINGS AT HOME, OR GET ALONG WITH OTHER PEOPLE: SOMEWHAT DIFFICULT
7. FEELING AFRAID AS IF SOMETHING AWFUL MIGHT HAPPEN: MORE THAN HALF THE DAYS
2. NOT BEING ABLE TO STOP OR CONTROL WORRYING: SEVERAL DAYS
GAD7 TOTAL SCORE: 7
3. WORRYING TOO MUCH ABOUT DIFFERENT THINGS: SEVERAL DAYS
1. FEELING NERVOUS, ANXIOUS, OR ON EDGE: SEVERAL DAYS
GAD7 TOTAL SCORE: 7
5. BEING SO RESTLESS THAT IT IS HARD TO SIT STILL: SEVERAL DAYS
4. TROUBLE RELAXING: SEVERAL DAYS
7. FEELING AFRAID AS IF SOMETHING AWFUL MIGHT HAPPEN: MORE THAN HALF THE DAYS
6. BECOMING EASILY ANNOYED OR IRRITABLE: NOT AT ALL
GAD7 TOTAL SCORE: 7
8. IF YOU CHECKED OFF ANY PROBLEMS, HOW DIFFICULT HAVE THESE MADE IT FOR YOU TO DO YOUR WORK, TAKE CARE OF THINGS AT HOME, OR GET ALONG WITH OTHER PEOPLE?: SOMEWHAT DIFFICULT

## 2022-09-30 ASSESSMENT — PATIENT HEALTH QUESTIONNAIRE - PHQ9
SUM OF ALL RESPONSES TO PHQ QUESTIONS 1-9: 14
SUM OF ALL RESPONSES TO PHQ QUESTIONS 1-9: 14
10. IF YOU CHECKED OFF ANY PROBLEMS, HOW DIFFICULT HAVE THESE PROBLEMS MADE IT FOR YOU TO DO YOUR WORK, TAKE CARE OF THINGS AT HOME, OR GET ALONG WITH OTHER PEOPLE: SOMEWHAT DIFFICULT
SUM OF ALL RESPONSES TO PHQ QUESTIONS 1-9: 14

## 2022-09-30 NOTE — RESULT ENCOUNTER NOTE
Paulina EnnisKatherine Flannery,  Your results came back with acceptable iron level  If you have any further concerns please do not hesitate to contact us by message, phone or making an appointment.  Have a good day   Dr Andrzej CARVAJAL

## 2022-09-30 NOTE — RESULT ENCOUNTER NOTE
Paulina Ms. Flannery,  Your results came back and a the ferritin level which is a form of iron storage is very low at 11.  This is likely due to poor absorption from prior sleeve gastrectomy.  Recommend taking Vitron-C daily as advised by the nutritionist along with calcium and vitamin D and B12 and thiamine as discussed with them in April and rechecking your ferritin levels with your primary care provider after 2 months of taking the medication.    If you have any further concerns please do not hesitate to contact us by message, phone or making an appointment.  Have a good day   Dr Andrzej CARVAJAL

## 2022-09-30 NOTE — PATIENT INSTRUCTIONS
Vaccine counseling done.  Grew up in [place] [facility] and may have had oral polio vaccination as part of public schooling protocol / [place]   No documentation available.  Given current polio outbreaks and travel options discussed.  Could do polio antibody testing to see if you are immune, or per CDC if has had prior 3 vaccines can get a booster or if unclear if has had any vaccines could repeat the full series of 3 of IPV.  IPV' is an inactivated vaccine and should not cause polio.  Opted to check antibodies and go from there.    Flu shot given today.  Completed primary COVID-vaccine series and given new bivalent COVID Pfizer booster today    Depression and anxiety discussed.  Currently safe.  Continue working with counselor and exercising.  Follow-up with your primary [facility] for further refills on propanolol.  Consider an SSRI in the future.    BMI 41, remote sleeve gastrectomy laparoscopically in 2015.  Encouraged to start supplements advised by the nutritionist we will check lab work today and also refer to a weight specialist for further evaluation and treatment.    Elevated blood pressure.  Recheck better but still slightly elevated.  Suspect from anxiety.  CBC, CMP, lipids and TSH and hemoglobin A1c were normal in February earlier this year.  Since blood pressure remains elevated recommend follow-up with [facility] your PCP for further surveillance.  Recommend a low-salt diet and counseling and exercise will be helpful.    History of thyroid nodule in the past s/p biopsy that was normal.  Thyroid function tests were normal in February this year.  May consider repeating ultrasound in the future with your primary care provider.    Noted to schedule for STD retesting in clinic next week but since we are going to get blood work today agreeable to getting it all today and orders by the midwife placed in July will be release to be done today.    Recommend going to the travel clinic in [facility] for upcoming travel and  follow-up with your primary [facility]el in February for routine preventive physical

## 2022-09-30 NOTE — PROGRESS NOTES
Assessment & Plan     Vaccine counseling  Vaccine counseling done.  Grew up in [facility] and may have had oral polio vaccination as part of public schooling [place]   No documentation available.  Given current polio  outbreaks and upcoming travel, options discussed.  Could do polio antibody testing to see if immune, or per CDC if has had prior 3 vaccines can get a booster or if unclear if has had any vaccines could repeat the full series of 3 of IPV.  IPV is an inactivated vaccine and should not cause polio.  Opted to check antibodies and go from there.    Flu shot given today.  Completed primary COVID-vaccine series and given new bivalent COVID Pfizer booster today    Depression and anxiety discussed.  Currently safe.  Continue working with counselor and exercising.  Follow-up with her primary Shontel for further refills on propanolol.  Consider an SSRI in the future.    BMI 41, remote sleeve gastrectomy laparoscopically in 2015.  Encouraged to start supplements advised by the nutritionist we will check lab work today and also refer to a weight specialist for further evaluation and treatment.    Elevated blood pressure.  Recheck better but still slightly elevated.  Suspect from anxiety.  CBC, CMP, lipids and TSH and hemoglobin A1c were normal in February earlier this year.  Since blood pressure remains elevated recommend follow-up with Сергей her PCP for further surveillance.  Recommend a low-salt diet and counseling and exercise will be helpful.    History of thyroid nodule in the past s/p biopsy that was normal.  Thyroid function tests were normal in February this year.  May consider repeating ultrasound in the future with her primary care provider.    Noted to schedule for STD retesting in clinic next week but since we are going to get blood work today agreeable to getting it all today and orders by the midwife placed in July will be released to be done today.    Recommend going to the travel  clinic in [facility] for upcoming travel and follow-up with your primary Shontel in February for routine preventive physical    Immunity status testing  Opted to check antibodies and go from there.  - Poliovirus Antibodies; Future  - Poliovirus Antibodies    Need for prophylactic vaccination and inoculation against influenza  Flu shot given today.  - INFLUENZA VACCINE IM >6 MO VALENT IIV4 (ALFURIA/FLUZONE)    Need for COVID-19 vaccine  Completed primary COVID-vaccine series and given new bivalent COVID Pfizer booster today  - COVID-19,PF,PFIZER BOOSTER BIVALENT 12+Yrs    Current moderate episode of major depressive disorder, unspecified whether recurrent (H)  Generalized anxiety disorder - vit d  Panic attack  Depression and anxiety discussed.  Currently safe.  Continue working with counselor and exercising.  Follow-up with her primary Shontel for further refills on propanolol.  Consider an SSRI in the future. Recommend vit d 2000 units daily    Morbid obesity (H)- [facility] Weight [facility]; Future  History of sleeve gastrectomy  BMI 41, remote sleeve gastrectomy laparoscopically in 2015.  Encouraged to start supplements advised by the nutritionist we will check lab work today and also refer to a weight specialist for further evaluation and treatment.  - Comprehensive Weight [facility]; Future  - Vitamin B12; Future  - Iron and iron binding capacity; Future  - Ferritin; Future  - Vitamin D Deficiency; Future  - Vitamin B12  - Iron and iron binding capacity  - Ferritin  - Vitamin D Deficiency    Elevated blood pressure reading without diagnosis of hypertension  Elevated blood pressure.  Recheck better but still slightly elevated.  Suspect from anxiety.  CBC, CMP, lipids and TSH and hemoglobin A1c were normal in February earlier this year.  Since blood pressure remains elevated recommend follow-up with Сергей her PCP for further surveillance.  Recommend a low-salt diet and counseling and exercise will be  "helpful.  - Comprehensive Weight Management; Future    History of thyroid nodule  History of thyroid nodule in the past s/p biopsy that was normal.  Thyroid function tests were normal in February this year.  May consider repeating ultrasound in the future with her primary care provider.    Routine screening for STI (sexually transmitted infection)  Noted to schedule for STD retesting in clinic next week but since we are going to get blood work today agreeable to getting it all today and orders by the midwife placed in July will be released to be done today.  - HIV Antigen Antibody Combo  - Hepatitis B surface antigen  - Hepatitis C antibody  - Treponema Abs w Reflex to RPR and Titer  - Wet prep - Clinic Collect  - Chlamydia trachomatis/Neisseria gonorrheae by PCR - Clinic Collect    Diagnosis or treatment significantly limited by social determinants of health - limited records, anxiety  Ordering of each unique test  55 minutes spent on the date of the encounter doing chart review, history and exam, documentation and further activities per the note     BMI:   Estimated body mass index is 41.5 kg/m  as calculated from the following:    Height as of this encounter: 1.68 m (5' 6.14\").    Weight as of this encounter: 117.1 kg (258 lb 3.2 oz).   Weight management plan: Patient referred to endocrine and/or weight management specialty Discussed healthy diet and exercise guidelines    Depression Screening Follow Up    PHQ 9/30/2022   PHQ-9 Total Score 14   Q9: Thoughts of better off dead/self-harm past 2 weeks Not at all     Last PHQ-9 9/30/2022   1.  Little interest or pleasure in doing things 2   2.  Feeling down, depressed, or hopeless 2   3.  Trouble falling or staying asleep, or sleeping too much 2   4.  Feeling tired or having little energy 2   5.  Poor appetite or overeating 2   6.  Feeling bad about yourself 2   7.  Trouble concentrating 2   8.  Moving slowly or restless 0   Q9: Thoughts of better off dead/self-harm " past 2 weeks 0   PHQ-9 Total Score 14   Difficulty at work, home, or with people -       Follow Up Actions Taken  Referred patient back to current mental health provider.  Referred patient back to PCP     Work on weight loss  Regular exercise  See Patient Instructions    Return in about 6 months (around 3/30/2023) for Routine preventive, in person with PCP Lester .    Erma Donnelly MD  [facility] Calixto is a 37 year old, presenting for the following health issues:  Immunization    Healthy Habits:     Bi-annual eye exam:  NO    Dental care twice a year:  Yes    Sleep apnea or symptoms of sleep apnea:  None    Diet:  Regular (no restrictions)    Frequency of exercise:  4-5 days/week    Duration of exercise:  45-60 minutes    Taking medications regularly:  Yes    Medication side effects:  Not applicable    PHQ-2 Total Score: 4    Additional concerns today:  Yes   Answers for HPI/ROS submitted by the patient on 9/30/2022  If you checked off any problems, how difficult have these problems made it for you to do your work, take care of things at home, or get along with other people?: Somewhat difficult  PHQ9 TOTAL SCORE: 14  DAYNE 7 TOTAL SCORE: 7  Conflicting answers have been found for some questions. Please document the patient's answers manually.  Pt states that she is here for immunizations    37-year-old lady history of morbid obesity, prior sleeve gastrectomy in 2015,  previously on Multivitamin, iron & vitamin D currently on no supplements,history of DAYNE, MDD, panic on propanolol as needed prescribed previously by Dr. Mora, history of wisdom tooth extractions, seasonal allergies on Zyrtec prn,   Grew up in [place], family originate from [place], went to college in [place], went back to [place] and came to [place] for work at the start of the pandemic.  Mother's family originate from [place].  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  Seen by PCP Сергей Massey 2/16/2022 for a physical & treated for inflamed labial cyst with Augmentin Bactrim and Diflucan.  Referred to the nutritionist for weight.  Seen by them virtually and given list of vitamins should be on given prior surgical history but not started.  Seen by midwife in July for STD testing which was negative and treated for BV with Flagyl.  Repeat testing scheduled for October.  Evisit done in August regarding concerns about polio vaccine.  Started counseling in September.,    Appointment today made originally as a preventive physical to get vaccines but then clarified already had a physical in February so changed to an office visit. New to this provider  Is concerned about the polio vaccine.  Grew up in [place] and [place].  Went to public school and  there where would have required routine preventive vaccines including polio.  Feels has had the oral polio vaccine but there is no documentation with her or her mother.  Wondering if she should get a booster for polio or not.    Also needs the flu shot  Has had primary COVID-vaccine series and needs new bivalent vaccine.  Plans to be traveling back to [place] in November and may be also [place].    Has depression anxiety and panic attack.  Currently declines a daily medication.  Feels safe.  Working with a counselor and exercising to help with mood.  Has rare use of propanolol given 270 pills a year ago by Dr. Mora    BMI 41, remote sleeve gastrectomy laparoscopically in 2015.  Not taking supplements.  Seen by nutritionist given list of supplements to take but did not start.  Would be open to referral to a weight specialist.    Elevated blood pressure today but is feeling anxious.  Reports no fever or chills, no headache or dizziness, no double or blurry vision, no facial pain, earache, sore throat, runny nose, post nasal drip, no trouble hearing, smelling, tasting or swallowing, no  cough , no chest pain, trouble breathing  No abdominal pain, heart burn, reflux, nausea or vomiting or diarrhea or constipation, no blood in stools or black stools, no weight loss or night sweats. No dysuria, hematuria, frequency, urgency, hesitancy, incontinence, No pelvic complaints. No leg swellingor joint pain. No rash.  Has nausea and palpitations with anxiety    History of thyroid nodule in the past s/p biopsy that was normal.  Thyroid function tests were normal in February earlier this year.  Opts to wait to repeat an ultrasound for now.  Reports no trouble swallowing and no lump felt.    Scheduled for STD testing in clinic next week & agreeable to getting today as getting lab work for other reasons today.    Depression Screening Follow Up    PHQ 9/30/2022   PHQ-9 Total Score 14   Q9: Thoughts of better off dead/self-harm past 2 weeks Not at all     Last PHQ-9 9/30/2022   1.  Little interest or pleasure in doing things 2   2.  Feeling down, depressed, or hopeless 2   3.  Trouble falling or staying asleep, or sleeping too much 2   4.  Feeling tired or having little energy 2   5.  Poor appetite or overeating 2   6.  Feeling bad about yourself 2   7.  Trouble concentrating 2   8.  Moving slowly or restless 0   Q9: Thoughts of better off dead/self-harm past 2 weeks 0   PHQ-9 Total Score 14   Difficulty at work, home, or with people -       Follow Up Actions Taken  Referred patient back to current mental health provider.  Referred patient back to PCP     Depression Screening Follow-up    PHQ 9/30/2022   PHQ-9 Total Score 14   Q9: Thoughts of better off dead/self-harm past 2 weeks Not at all       Does the patient currently have a mental health provider?  Yes, patient was referred back to current mental health provider.    Review of Systems   Constitutional: Negative for chills and fever.   HENT: Negative for congestion, ear pain, hearing loss and sore throat.    Eyes: Negative for pain and visual disturbance.  "  Respiratory: Negative for cough and shortness of breath.    Cardiovascular: Negative for chest pain, palpitations and peripheral edema.   Gastrointestinal: Negative for abdominal pain, constipation, diarrhea, heartburn, hematochezia and nausea.   Breasts:  Negative for tenderness, breast mass and discharge.   Genitourinary: Negative for dysuria, frequency, genital sores, hematuria, pelvic pain, urgency, vaginal bleeding and vaginal discharge.   Musculoskeletal: Negative for arthralgias, joint swelling and myalgias.   Skin: Negative for rash.   Neurological: Negative for dizziness, weakness, headaches and paresthesias.   Psychiatric/Behavioral: Negative for mood changes. The patient is nervous/anxious.       Constitutional, HEENT, cardiovascular, pulmonary, GI, , musculoskeletal, neuro, skin, endocrine and psych systems are negative, except as otherwise noted.      Objective    BP (!) 138/92 (BP Location: Left arm, Patient Position: Sitting, Cuff Size: Adult Large)   Pulse 80   Temp 97.3  F (36.3  C) (Temporal)   Resp 18   Ht 1.68 m (5' 6.14\")   Wt 117.1 kg (258 lb 3.2 oz)   LMP 08/30/2022 (Approximate)   SpO2 100%   BMI 41.50 kg/m    Body mass index is 41.5 kg/m .  Physical Exam   GENERAL: healthy, alert, no distress and obese  EYES: Eyes grossly normal to inspection, PERRL and conjunctivae and sclerae normal  HENT: ear canals and TM's normal, nose and mouth without ulcers or lesions  NECK: no adenopathy, no asymmetry, masses, or scars and thyroid normal to palpation  RESP: lungs clear to auscultation - no rales, rhonchi or wheezes  CV: regular rate and rhythm, normal S1 S2, no S3 or S4, no murmur, click or rub, no peripheral edema and peripheral pulses strong  ABDOMEN: soft, nontender, no hepatosplenomegaly, no masses and bowel sounds normal  MS: no gross musculoskeletal defects noted, no edema  SKIN: no suspicious lesions or rashes  NEURO: Normal strength and tone, mentation intact and speech " normal  PSYCH: mentation appears normal, affect flat, judgement and insight intact and appearance well groomed    Office Visit on 08/04/2022   Component Date Value Ref Range Status     SARS CoV2 PCR 08/04/2022 Negative  Negative Final    NEGATIVE: SARS-CoV-2 (COVID-19) RNA not detected, presumed negative.     No results found for any visits on 09/30/22.  No results found for this or any previous visit (from the past 24 hour(s)).

## 2022-10-01 NOTE — PROGRESS NOTES
"[facility]  Provider Name: Sol Santoyo, Credentials:  MSW- LICSW; [facility] PATIENT'S NAME: Durga Flannery  PREFERRED NAME: Durga  PRONOUNS:      she, her, hers  MRN: [id number]  : 1985  ADDRESS: [address]  [facility] [address]  ACCT. NUMBER:  [id number]  DATE OF SERVICE: 2022  START TIME: 13:00  END TIME: 14:00  PREFERRED PHONE: [phone]  May we leave a program related message: Yes  SERVICE MODALITY:  Video Visit:      Provider verified identity through the following two step process.  Patient provided:  Patient  and Patient address    Telemedicine Visit: The patient's condition can be safely assessed and treated via synchronous audio and visual telemedicine encounter.      Reason for Telemedicine Visit: Services only offered telehealth    Originating Site (Patient Location): Patient's home    Distant Site (Provider Location): Provider Remote Setting    Consent:  The patient/guardian has verbally consented to: the potential risks and benefits of telemedicine (video visit) versus in person care; bill my insurance or make self-payment for services provided; and responsibility for payment of non-covered services.     Patient would like the video invitation sent by:  My Chart    Mode of Communication:  Video Conference via [employer]    As the provider I attest to compliance with applicable laws and regulations related to telemedicine.    UNIVERSAL ADULT Mental Health DIAGNOSTIC ASSESSMENT    Identifying Information:  Patient is a 37 year old, ; other individual.    Patient was referred for an assessment by  self.  Patient attended the session alone.    Chief Complaint:   The reason for seeking services at this time is: \"Grief counseling and anxiety\".  The problem(s) began 2022 date she found out about the death of the person she cares about. The person passed away on .     Patient has not attempted to resolve these concerns in the past: new " issue    Social/Family History:  Patient reported he grew up [facility].  They were raised by biological parents  .  Parents  / . Patient was 32. Parents were good friends until dad passed away. Patient reported that his childhood was positive. Close family in general .  Patient described his current relationships with family of origin as  Good relationship with mom. Also had a good relationship with dad before he passed..     The patient describes his cultural background as : mom is , dad is . Cultural influences and impact on patient's life structure, values, norms, and healthcare:  background, raised on [facility], moved to [place] in 2020, recently loss friend.  Contextual influences on patient's health include: Individual Factors : grief of a friend and Health- Seeking Factors : difficulty seeking help when anxious.    These factors will be addressed in the Preliminary Treatment plan. Patient identified his preferred language to be English. Patient reported he does not need the assistance of an  or other support involved in therapy.     Patient reported had no significant delays in developmental tasks.   Patient's highest education level was graduate school  ; BA in Journalism and MA in international studies. Patient identified the following learning problems: none reported.  Modifications will not be used to assist communication in therapy.  Patient reports she is  able to understand written materials.    Patient reported the following relationship history: 2 include the one she just lost.  Patient's current relationship status is single, never . Patient identified his sexual orientation as heterosexual.  Patient reported having   child(ernie). Patient identified mother; siblings; friends as part of his support system.  Patient identified the quality of these relationships as good,  .      Patient's current living/housing  situation involves staying in own home/apartment.  The immediate members of family and household include self only and she reports that housing is stable.    Patient is currently employed fulltime.  Patient reports his finances are obtained through employment. Patient does identify finances as a current stressor.      Patient reported that he has not been involved with the legal system.    Patient does not report being under probation/ parole/ jurisdiction. They are not under any current court jurisdiction. .     Patient's Strengths and Limitations:  Patient identified the following strengths or resources that will help them succeed in treatment: exercise routine, lois / spirituality, friends / good social support, family support, insight, intelligence and work ethic. Things that may interfere with the patient's success in treatment include: Anxiety .     Assessments:  The following assessments were completed by patient for this visit:  PHQ2:   PHQ-2 ( 1999 [employer]) 9/30/2022 9/30/2022 7/27/2022 2/13/2022 3/16/2021 11/5/2020   Q1: Little interest or pleasure in doing things 2 2 1 1 0 1   Q2: Feeling down, depressed or hopeless 2 2 1 1 0 1   PHQ-2 Score 4 4 2 2 0 2   PHQ-2 Total Score (12-17 Years)- Positive if 3 or more points; Administer PHQ-A if positive - - - - 0 2   Q1: Little interest or pleasure in doing things More than half the days - - Several days - Several days   Q2: Feeling down, depressed or hopeless More than half the days - - Several days - Several days   PHQ-2 Score 4 - - 2 - 2     PHQ9:   PHQ-9 SCORE 9/7/2021 7/27/2022 9/30/2022 9/30/2022   PHQ-9 Total Score MyChart - - - 14 (Moderate depression)   PHQ-9 Total Score 13 11 14 14     GAD2:   DAYNE-2 9/6/2022   Feeling nervous, anxious, or on edge 3   Not being able to stop or control worrying 3   DAYNE-2 Total Score 6     GAD7:   DAYNE-7 SCORE 9/7/2021 7/27/2022 9/6/2022 9/30/2022   Total Score - - 19 (severe anxiety) 7 (mild anxiety)   Total Score 15 13 19  7     CAGE-AID:   CAGE-AID Total Score 9/6/2022   Total Score 2   Total Score MyChart 0  (A total score of 2 or greater is considered clinically significant)     PROMIS 10-Global Health (all questions and answers displayed):   PROMIS 10 9/29/2022   In general, would you say your health is: Good   In general, would you say your quality of life is: Good   In general, how would you rate your physical health? Good   In general, how would you rate your mental health, including your mood and your ability to think? Fair   In general, how would you rate your satisfaction with your social activities and relationships? Fair   In general, please rate how well you carry out your usual social activities and roles Fair   To what extent are you able to carry out your everyday physical activities such as walking, climbing stairs, carrying groceries, or moving a chair? Completely   How often have you been bothered by emotional problems such as feeling anxious, depressed or irritable? Sometimes   How would you rate your fatigue on average? Moderate   How would you rate your pain on average?   0 = No Pain  to  10 = Worst Imaginable Pain 5   In general, would you say your health is: 3   In general, would you say your quality of life is: 3   In general, how would you rate your physical health? 3   In general, how would you rate your mental health, including your mood and your ability to think? 2   In general, how would you rate your satisfaction with your social activities and relationships? 2   In general, please rate how well you carry out your usual social activities and roles. (This includes activities at home, at work and in your community, and responsibilities as a parent, child, spouse, employee, friend, etc.) 2   To what extent are you able to carry out your everyday physical activities such as walking, climbing stairs, carrying groceries, or moving a chair? 5   In the past 7 days, how often have you been bothered by emotional  problems such as feeling anxious, depressed, or irritable? 3   In the past 7 days, how would you rate your fatigue on average? 3   In the past 7 days, how would you rate your pain on average, where 0 means no pain, and 10 means worst imaginable pain? 5   Global Mental Health Score 10   Global Physical Health Score 14   PROMIS TOTAL - SUBSCORES 24     [place] Suicide Severity Rating Scale (Lifetime/Recent)No flowsheet data found.  [place] Suicide Severity Rating Scale (Short Version)  [place] Suicide Severity Rating (Short Version) 9/29/2022   1. Wish to be Dead (Since Last Contact) 0   2. Non-Specific Active Suicidal Thoughts (Since Last Contact) 0   Actual Attempt (Since Last Contact) 0   Has subject engaged in non-suicidal self-injurious behavior? (Since Last Contact) 0   Interrupted Attempts (Since Last Contact) 0   Aborted or Self-Interrupted Attempt (Since Last Contact) 0   Preparatory Acts or Behavior (Since Last Contact) 0   Suicide (Since Last Contact) 0   Calculated C-SSRS Risk Score (Since Last Contact) No Risk Indicated     Personal and Family Medical History:  Patient does report a family history of mental health concerns.  Patient reports family history includes Asthma in her father; Cerebrovascular Disease in her maternal grandfather and maternal grandmother; Diabetes in her father; Hypertension in her father; Schizophrenia in her sister; Thyroid Disease in her mother; Uterine Cancer in her mother. Some mental health in the family: sister has Bipolar disorder and schizophrenia as well as developmental issues.    Patient does not report Mental Health Diagnosis or Treatment.      Patient has had a physical exam to rule out medical causes for current symptoms.  Date of last physical exam was within the past year. Symptoms have developed since last physical exam and client was encouraged to follow up with PCP.  . The patient has a [facility] Provider, who is named Сергей Massey..  Patient  reports no current medical and/or dental concerns.  Patient denies any issues with pain..   There are not significant appetite / nutritional concerns / weight changes.   Patient does not report a history of head injury / trauma / cognitive impairment.        Current Outpatient Medications:      cetirizine (ZYRTEC) 10 MG tablet, Take 1 tablet (10 mg) by mouth daily (Patient not taking: Reported on 9/30/2022), Disp: 30 tablet, Rfl: 0     Multiple Vitamins-Minerals (HAIR SKIN AND NAILS FORMULA PO), , Disp: , Rfl:      propranolol (INDERAL) 10 MG tablet, TAKE 1 TABLET (10 MG) BY MOUTH 3 TIMES DAILY AS NEEDED (ANXIETY PANIC ATTACK), Disp: 270 tablet, Rfl: 0    Medication Adherence:  Patient reports taking.  taking prescribed medications as prescribed.    Patient Allergies:    Allergies   Allergen Reactions     Seasonal Allergies      Medical History:  No past medical history on file.    Current Mental Status Exam:   Appearance:  Appropriate    Eye Contact:  Good   Psychomotor:  Normal       Gait / station:  no problem  Attitude / Demeanor: Cooperative   Speech      Rate / Production: Normal/ Responsive      Volume:  Normal  volume      Language:  intact  Mood:   Normal  Affect:   Appropriate    Thought Content: Clear   Thought Process: Coherent       Associations: No loosening of associations  Insight:   Good   Judgment:  Intact   Orientation:  Person Place Time Situation  Attention/concentration: Good    Substance Use:  Patient did not report a family history of substance use concerns; see medical history section for details.  Patient has not received chemical dependency treatment in the past.  Patient has not ever been to detox.      Patient is not currently receiving any chemical dependency treatment.     Patient reported the following problems as a result of his substance use: no problems, not applicable.    Substance Use: No symptoms    Based on the negative CAGE score and clinical interview there  are not  indications of drug or alcohol abuse.      Significant Losses / Trauma / Abuse / Neglect Issues:   Patient did not serve in the .  There are indications or report of significant loss, trauma, abuse or neglect issues related to: are no indications and client denies any losses, trauma, abuse, or neglect concerns.  Concerns for possible neglect are not present.     Safety Assessment:   Patient denies current homicidal ideation and behaviors.  Patient denies current self-injurious ideation and behaviors.    Patient denied risk behaviors associated with substance use.  Patient denies any high risk behaviors associated with mental health symptoms.  Patient reports the following current concerns for his personal safety: None.  Patient reports there are not firearms in the house.       History of Safety Concerns:  Patient denied a history of homicidal ideation.     Patient denied a history of personal safety concerns.    Patient denied a history of assaultive behaviors.    Patient denied a history of sexual assault behaviors.     Patient denied a history of risk behaviors associated with substance use.  Patient denies any history of high risk behaviors associated with mental health symptoms.  Patient reports the following protective factors: dedication to family or friends; regular physical activity; daily obligations    Risk Plan:  See Recommendations for Safety and Risk Management Plan    Review of Symptoms per patient report:  Depression: Change in sleep, Lack of interest, Excessive or inappropriate guilt, Change in energy level, Difficulties concentrating, Change in appetite, Ruminations, Withdrawn, Frequent crying and housing keeping issues  Danica:  No Symptoms  Psychosis: No Symptoms  Anxiety: Excessive worry, Social anxiety, Fears/phobias : related to the recent loss, Ruminations and Poor concentration  Panic:  Palpitations, Shortness of breath and Sense of impending doom  Post Traumatic Stress Disorder:  No  Symptoms   Eating Disorder: No Symptoms  ADD / ADHD:  No symptoms  Conduct Disorder: No symptoms  Autism Spectrum Disorder: No symptoms  Obsessive Compulsive Disorder: No Symptoms    Patient reports the following compulsive behaviors and treatment history: None reported.      Diagnostic Criteria:   Generalized Anxiety Disorder  A. Excessive anxiety and worry about a number of events or activities (such as work or school performance).   B. The person finds it difficult to control the worry.   - Restlessness or feeling keyed up or on edge.    - Being easily fatigued.    - Difficulty concentrating or mind going blank.    - Irritability.   D. The focus of the anxiety and worry is not confined to features of an Axis I disorder.  E. The anxiety, worry, or physical symptoms cause clinically significant distress or impairment in social, occupational, or other important areas of functioning.  Grief reaction [F43.21]-    Grief reaction [F43.21]   just lost a close friend which has been making feel sad, and depressed. Noted change in sleep, Lack of interest, Excessive or inappropriate guilt, Change in energy level, Difficulties concentrating, Change in appetite, Ruminations, Withdrawn, Frequent crying and housing keeping issues- has slowed down taking care of household responsibility which is unusual to her.    Functional Status:  Patient reports the following functional impairments:  self-care/grief   Nonprogrammatic care:  Patient is requesting basic services to address current mental health concerns.    Clinical Summary:  1. Reason for assessment: Grief of a very close friend .  2. Psychosocial, Cultural and Contextual Factors: a best friend . Patient found out later. Never had chance to go to his funerals. She believes he  of SA  3. Principal DSM5 Diagnoses  (Sustained by DSM5 Criteria Listed Above):Grief reaction [F43.21]    4. Other Diagnoses that is relevant to services: Generalized Anxiety disorder  5.  Provisional Diagnosis:  Grief reaction [F43.21] as evidenced by today's interview, chart review, clinical inventories, and mental status  6. Prognosis: Return to Normal Functioning.  7. Likely consequences of symptoms if not treated: get worse.  8. Client strengths include:  caring, creative, educated, empathetic, employed, goal-focused, good listener, insightful, intelligent, motivated, open to learning, open to suggestions / feedback, responsible parent, support of family, friends and providers, wants to learn, willing to ask questions and work history .     Recommendations:   1. Plan for Safety and Risk Management:   Safety and Risk: Recommended that patient call 911 or go to the local ED should there be a change in any of these risk factors..          Report to child / adult protection services was NA.     2. Patient's identified mental health concerns with a cultural influence will be addressed by Patient.     3. Initial Treatment will focus on:    Grief / Loss - : close friend.     4. Resources/Service Plan:    services are not indicated.   Modifications to assist communication are not indicated.   Additional disability accommodations are not indicated.      5. Collaboration:   Collaboration / coordination of treatment will be initiated with the following  support professionals: primary care physician.    6.  Referrals:   The following referral(s) will be initiated: Not needed this time. Next Scheduled Appointment: 10/10/2022.     A Release of Information has been obtained for the following: No Need for BRYAN for  F providers.     Emergency Contact was obtained today. This is a verbal permission    7. LIANG: NO LIANG concerns    8. Records:   These were reviewed at time of assessment.   Information in this assessment was obtained from the medical record and  provided by patient who is a good historian. Patient will have open access to his mental health medical record.    Provider Name/ Credentials:  Sol Santoyo, [facility]- LICSW; [facility]  September 29, 2022

## 2022-10-01 NOTE — RESULT ENCOUNTER NOTE
Paulina EnnisKatherine Flannery,  Your results came back with a normal vitamin b 12   If you have any further concerns please do not hesitate to contact us by message, phone or making an appointment.  Have a good day   Dr Andrzej CARVAJAL

## 2022-10-02 LAB — T PALLIDUM AB SER QL: NONREACTIVE

## 2022-10-03 LAB
DEPRECATED CALCIDIOL+CALCIFEROL SERPL-MC: 35 UG/L (ref 20–75)
HBV SURFACE AG SERPL QL IA: NONREACTIVE
HCV AB SERPL QL IA: NONREACTIVE
HIV 1+2 AB+HIV1 P24 AG SERPL QL IA: NONREACTIVE

## 2022-10-03 NOTE — RESULT ENCOUNTER NOTE
Paulina Ms. Flannery,  Your results came back and are within acceptable limits. -Vitamin D level is normal and getting 1000 IU daily in your diet or supplements is recommended. . If you have any further concerns please do not hesitate to contact us by message, phone or making an appointment.  Have a good day   Dr Andrzej CARVAJAL

## 2022-10-10 ENCOUNTER — VIRTUAL VISIT (OUTPATIENT)
Dept: FAMILY MEDICINE | Facility: CLINIC | Age: 37
End: 2022-10-10
Payer: COMMERCIAL

## 2022-10-10 ENCOUNTER — VIRTUAL VISIT (OUTPATIENT)
Dept: PSYCHOLOGY | Facility: CLINIC | Age: 37
End: 2022-10-10
Payer: COMMERCIAL

## 2022-10-10 DIAGNOSIS — F43.21 GRIEF REACTION: Primary | ICD-10-CM

## 2022-10-10 DIAGNOSIS — F41.1 GAD (GENERALIZED ANXIETY DISORDER): ICD-10-CM

## 2022-10-10 DIAGNOSIS — R03.0 ELEVATED BLOOD PRESSURE READING WITHOUT DIAGNOSIS OF HYPERTENSION: Primary | ICD-10-CM

## 2022-10-10 PROCEDURE — 99213 OFFICE O/P EST LOW 20 MIN: CPT | Mod: 95 | Performed by: FAMILY MEDICINE

## 2022-10-10 PROCEDURE — 90837 PSYTX W PT 60 MINUTES: CPT | Mod: 95 | Performed by: SOCIAL WORKER

## 2022-10-10 NOTE — PATIENT INSTRUCTIONS
Check your blood pressure after sitting and resting for 10 minutes.  Goal blood pressure is less than 140/90.  Check your blood pressure 2-3 times a week.  Please log your blood pressures and bring to your next visit.  If your blood pressures are above 140/90 several readings in a row please follow-up in clinic.

## 2022-10-10 NOTE — PROGRESS NOTES
Durga is a 37 year old who is being evaluated via a billable video visit.          Assessment & Plan     Elevated blood pressure reading without diagnosis of hypertension  -Unable to measure BP today on virtual visit.  -Pt has BP cuff at home, discussed home BP monitoring.  -Continue with healthy eating and daily physical activity  -Follow-up in 6 weeks for recheck. If BP elevated at this time will need to discuss starting BP medication         Follow-up Visit   Expected date:  Nov 21, 2022 (Approximate)      Follow Up Appointment Details:     Follow-up with whom?: PCP    Follow-Up for what?: Other (Office Visit)    Additional Details: Blood pressure    How?: In Person                    Hernandez Torres DO  [facility]    [place] Cal[place]o is a 37 year old, presenting for the following health issues:  No chief complaint on file.      HPI     Patient here to follow-up on elevated BP readings from prior visit. Was suppose to have an inperson visit today but provider had to cancel. Not currently checking her blood pressure at home. Hx gastric bypass surgery. Eats small meals. Eats fairly healthy. Rarely consumes alcohol or sweetened drinks. Does not smoke. Has been under a lot of stress lately, thinks this might be contributing.     Walks two miles a day. Doesstrength training several times a week. Would like to try to see if her blood pressure will improve with lifestyle modifications.      BP Readings from Last 6 Encounters:   09/30/22 (!) 128/90   08/04/22 (!) 150/102   08/01/22 132/84   07/27/22 (!) 149/96   02/16/22 132/88   07/13/21 134/81     Review of Systems         Objective         Vitals:  No vitals were obtained today due to virtual visit.    Physical Exam   GENERAL: Healthy, alert and no distress  EYES: Eyes grossly normal to inspection.  No discharge or erythema, or obvious scleral/conjunctival abnormalities.  RESP: No audible wheeze, cough, or visible cyanosis.  No visible  retractions or increased work of breathing.    SKIN: Visible skin clear. No significant rash, abnormal pigmentation or lesions.  NEURO: Cranial nerves grossly intact.  Mentation and speech appropriate for age.  PSYCH: Mentation appears normal, affect normal/bright, judgement and insight intact, normal speech and appearance well-groomed.    Video-Visit Details    Video Start Time: 1:51 PM    Type of service:  Video Visit    Video End Time:1:51 PM    Originating Location (pt. Location): Home    Distant Location (provider location):  [facility]     Platform used for Video Visit: [employer]

## 2022-10-10 NOTE — PROGRESS NOTES
[facility]                                     Progress Note    Patient Name: Durga Flannery  Date: October 10, 2022         Service Type: Individual      Session Start Time: 15:02  Session End Time:15:57     Session Length:55    Session #:1    Attendees: Client    Service Modality:  Video Visit:      Provider verified identity through the following two step process.  Patient provided:  Patient  and Patient address    Telemedicine Visit: The patient's condition can be safely assessed and treated via synchronous audio and visual telemedicine encounter.      Reason for Telemedicine Visit: Services only offered telehealth    Originating Site (Patient Location): Patient's home    Distant Site (Provider Location): Provider Remote Setting- Home Office    Consent:  The patient/guardian has verbally consented to: the potential risks and benefits of telemedicine (video visit) versus in person care; bill my insurance or make self-payment for services provided; and responsibility for payment of non-covered services.     Patient would like the video invitation sent by:  My Chart    Mode of Communication:  Video Conference via Amwell    As the provider I attest to compliance with applicable laws and regulations related to telemedicine.    DATA  Interactive Complexity: No  Crisis: No      Progress Since Last Session (Related to Symptoms / Goals / Homework):   Symptoms: continues to process and makes effort to learn the best option to feel better    Homework: Achieved / completed to satisfaction      Episode of Care Goals: Achieved / completed to satisfaction - ACTION (Actively working towards change); Intervened by reinforcing change plan / affirming steps taken     Current / Ongoing Stressors and Concerns: Patient reports having been struggling to come to acceptance about her loss. She notes she won't be the same and would like to figure out how to move on. Had an opportunity to share what she knows  "about the man she only knew for about 5 years and yet left so much pain in her mainly due to how she found out and how she will never find out how he . Today patient was challenged with the question, \" who do you want to be\" moving on. Her treatment plan will be focusing on  Grief processing and some coping skills as well as how to deal with her anxiety. She plans to see the family in November which will help to heal. She will also practice some grounding techniques discussed.  Her next visit is in 2 weeks.     Treatment Objective(s) Addressed in This Session:   identify three distraction and diversion activities and use those activities to decrease level of anxiety    increase understanding of steps in the grief process   Prepare a narrative about the question \" Who do you want to become?\"     Intervention:     Situation        Automatic Thoughts  Cognitive Distortions      Feelings        Behavior        Questioning Thoughts          Motivational Interviewing    MI Intervention: Co-Developed Goal: targeting grief and anxiety, Expressed Empathy/Understanding, Supported Autonomy, Collaboration, Evocation, Permission to raise concern or advise and Open-ended questions     Change Talk Expressed by the Patient: Taking steps    Provider Response to Change Talk: E - Evoked more info from patient about behavior change, A - Affirmed patient's thoughts, decisions, or attempts at behavior change, R - Reflected patient's change talk and S - Summarized patient's change talk statements    Assessments completed prior to visit:2022    The following assessments were completed by patient for this visit:  PHQ2:   PHQ-2 (  [employer]) 2022 2022 2022 2022 3/16/2021 2020   Q1: Little interest or pleasure in doing things 2 2 1 1 0 1   Q2: Feeling down, depressed or hopeless 2 2 1 1 0 1   PHQ-2 Score 4 4 2 2 0 2   PHQ-2 Total Score (12-17 Years)- Positive if 3 or more points; Administer PHQ-A if positive " - - - - 0 2   Q1: Little interest or pleasure in doing things More than half the days - - Several days - Several days   Q2: Feeling down, depressed or hopeless More than half the days - - Several days - Several days   PHQ-2 Score 4 - - 2 - 2     PHQ9:   PHQ-9 SCORE 9/7/2021 7/27/2022 9/30/2022 9/30/2022   PHQ-9 Total Score MyChart - - - 14 (Moderate depression)   PHQ-9 Total Score 13 11 14 14     GAD2:   DAYNE-2 9/6/2022 10/10/2022   Feeling nervous, anxious, or on edge 3 1   Not being able to stop or control worrying 3 1   DAYNE-2 Total Score 6 2     GAD7:   DAYNE-7 SCORE 9/7/2021 7/27/2022 9/6/2022 9/30/2022   Total Score - - 19 (severe anxiety) 7 (mild anxiety)   Total Score 15 13 19 7     CAGE-AID:   CAGE-AID Total Score 9/6/2022   Total Score 2   Total Score MyChart 2 (A total score of 2 or greater is considered clinically significant)     PROMIS 10-Global Health (all questions and answers displayed):   PROMIS 10 9/29/2022 10/10/2022   In general, would you say your health is: Good Fair   In general, would you say your quality of life is: Good Fair   In general, how would you rate your physical health? Good Fair   In general, how would you rate your mental health, including your mood and your ability to think? Fair Fair   In general, how would you rate your satisfaction with your social activities and relationships? Fair Fair   In general, please rate how well you carry out your usual social activities and roles Fair Good   To what extent are you able to carry out your everyday physical activities such as walking, climbing stairs, carrying groceries, or moving a chair? Completely Mostly   How often have you been bothered by emotional problems such as feeling anxious, depressed or irritable? Sometimes Sometimes   How would you rate your fatigue on average? Moderate Moderate   How would you rate your pain on average?   0 = No Pain  to  10 = Worst Imaginable Pain 5 6   In general, would you say your health is: 3 2   In  general, would you say your quality of life is: 3 2   In general, how would you rate your physical health? 3 2   In general, how would you rate your mental health, including your mood and your ability to think? 2 2   In general, how would you rate your satisfaction with your social activities and relationships? 2 2   In general, please rate how well you carry out your usual social activities and roles. (This includes activities at home, at work and in your community, and responsibilities as a parent, child, spouse, employee, friend, etc.) 2 3   To what extent are you able to carry out your everyday physical activities such as walking, climbing stairs, carrying groceries, or moving a chair? 5 4   In the past 7 days, how often have you been bothered by emotional problems such as feeling anxious, depressed, or irritable? 3 3   In the past 7 days, how would you rate your fatigue on average? 3 3   In the past 7 days, how would you rate your pain on average, where 0 means no pain, and 10 means worst imaginable pain? 5 6   Global Mental Health Score 10 9   Global Physical Health Score 14 12   PROMIS TOTAL - SUBSCORES 24 21     [place] Suicide Severity Rating Scale (Short Version)  [place] Suicide Severity Rating (Short Version) 9/29/2022   1. Wish to be Dead (Since Last Contact) 0   2. Non-Specific Active Suicidal Thoughts (Since Last Contact) 0   Actual Attempt (Since Last Contact) 0   Has subject engaged in non-suicidal self-injurious behavior? (Since Last Contact) 0   Interrupted Attempts (Since Last Contact) 0   Aborted or Self-Interrupted Attempt (Since Last Contact) 0   Preparatory Acts or Behavior (Since Last Contact) 0   Suicide (Since Last Contact) 0   Calculated C-SSRS Risk Score (Since Last Contact) No Risk Indicated       ASSESSMENT: Current Emotional / Mental Status (status of significant symptoms):   Risk status (Self / Other harm or suicidal ideation)   Patient denies current fears or concerns for personal  safety.   Patient denies current or recent suicidal ideation or behaviors.   Patient denies current or recent homicidal ideation or behaviors.   Patient denies current or recent self injurious behavior or ideation.   Patient denies other safety concerns.   Patient reports there has been no change in risk factors since their last session.     Patient reports there has been no change in protective factors since their last session.     Recommended that patient call 911 or go to the local ED should there be a change in any of these risk factors.     Appearance:   Appropriate    Eye Contact:   Good    Psychomotor Behavior: Normal    Attitude:   Cooperative    Orientation:   Person Place Time Situation   Speech    Rate / Production: Normal/ Responsive Normal     Volume:  Normal    Mood:    Normal   Affect:    Tearful   Thought Content:  Clear    Thought Form:  Coherent  Logical    Insight:    Good      Medication Review:   No current psychiatric medications prescribed     Medication Compliance:   NA     Changes in Health Issues:   None reported     Chemical Use Review:   Substance Use: Chemical use reviewed, no active concerns identified      Tobacco Use: No current tobacco use.      Diagnosis:  1. Grief reaction    2. DAYNE (generalized anxiety disorder)      Collateral Reports Completed:   Routed note to PCP    PLAN: (Patient Tasks / Therapist Tasks / Other)  Patient will read her Treatment plan  Patient will practice the grounding techniques discussed today as needed.   Patient's next visit is on 10/25/2022    LEOBARDO Toney                                               ___________________________________________________________________    Individual Treatment Plan    Patient's Name: Durga Flannery  YOB: 1985    Date of Creation: 10/10/2022    Date Treatment Plan Last Reviewed/Revised: 10/10/2022    DSM5 Diagnoses: 300.02 (F41.1) Generalized Anxiety Disorder;           Grief reaction  "[F43.21]  Psychosocial / Contextual Factors: a best friend . Patient found out later. Never had chance to go to his funerals. She believes he  of SA    PROMIS (reviewed every 90 days): 21    Referral / Collaboration:  Referral to another professional/service is not indicated at this time..    Anticipated number of session for this episode of care: 6-9 sessions- Subject to change  Anticipation frequency of session: Biweekly  Anticipated Duration of each session: 53 or more minutes  Treatment plan will be reviewed in 90 days or when goals have been changed.     MeasurableTreatment Goal(s) related to diagnosis / functional impairment(s)  Goal 1: Patient will be able to talk about her loss more directly without being overwhelmed by feelings of grief.     I will know I've met my goal when the feelings of depression will be lifted to allow me achieve acceptance\"    Objective #A (Patient Action)    Status: New - Date: 10/10/2022   Patient will increase understanding of steps in the grief process.  Intervention(s)  Therapist will provide space to share and process her loss. offer support and practical tool to help regain confience to move forward. .         Goal 2: Stabilize anxiety level while increasing ability to function on a daily basis.       I will know I've met my goal when I feel comfortable sharing my story with               loved ones without overwhelming negative emotions.    Objective #A (Patient Action)                          Patient will identify at least 3 triggers for anxiety.  Status: Continued - Date(s): 10/10/2022  Intervention(s)  Therapist will review the CBT skills to help challenge any ANTs related to driving as they present.      Objective #A (Client Action)                Client will use thought-stopping strategy daily to reduce intrusive thoughts.  Status: Continued - Date(s): 10/10/2022  Intervention(s)  Therapist will assign homework : grounding techniques to self soothe with the 5 " senses.    Objective #C (Patient Action)                      Patient will identify at least 3 triggers for anxiety.  Status: Continued - Date(s): 10/10/2022  Intervention(s)  Therapist will teach distraction skills. Using the 5 senses.     Objective #B  Patient will identify at least 3 adaptive coping statements to counteract negative thoughts.  Status: Continued - Date(s): 10/10/2022  Intervention(s)  Therapist will teach emotional regulation skills. increase positive self talk.     Patient has reviewed and agreed to the above plan.     LEOBARDO Toney  October 10, 2022

## 2022-10-12 LAB
PV1 NAB TITR SER NT: NORMAL {TITER}
PV3 NAB TITR SER NT: NORMAL {TITER}

## 2022-10-13 NOTE — RESULT ENCOUNTER NOTE
Paulina Ms. Flannery,  Your results came back and no detectable polio virus antibodies detected   Reccommend getting 3 dose IPV polio series  May schedule with the medical assistant if desired  If you have any further concerns please do not hesitate to contact us by message, phone or making an appointment.  Have a good day   Dr Andrzej CARVAJAL

## 2022-10-17 ENCOUNTER — MYC MEDICAL ADVICE (OUTPATIENT)
Dept: FAMILY MEDICINE | Facility: CLINIC | Age: 37
End: 2022-10-17

## 2022-10-19 NOTE — TELEPHONE ENCOUNTER
Dr Melendez,    Pt writes:    I d like to go ahead and begin taking the blood pressure medication (rather than waiting six weeks to see if i can manage it on my own).    Pharmacy selected if you would like to order    Kia Timmons RN, BSN  [facility]

## 2022-10-20 NOTE — TELEPHONE ENCOUNTER
Please have  her schedule virtual with me. We will discuss medication, side effects, and monitoring.    Thanks!    Hernandez Torres, DO

## 2022-10-27 ENCOUNTER — VIRTUAL VISIT (OUTPATIENT)
Dept: FAMILY MEDICINE | Facility: CLINIC | Age: 37
End: 2022-10-27
Payer: COMMERCIAL

## 2022-10-27 DIAGNOSIS — I10 BENIGN ESSENTIAL HYPERTENSION: Primary | ICD-10-CM

## 2022-10-27 PROCEDURE — 99214 OFFICE O/P EST MOD 30 MIN: CPT | Mod: GT | Performed by: NURSE PRACTITIONER

## 2022-10-27 RX ORDER — HYDROCHLOROTHIAZIDE 12.5 MG/1
12.5 TABLET ORAL DAILY
Qty: 90 TABLET | Refills: 1 | Status: SHIPPED | OUTPATIENT
Start: 2022-10-27 | End: 2023-03-20

## 2022-10-27 NOTE — PATIENT INSTRUCTIONS
"Your potassium is slightly low from your blood pressure medication.  Make sure you are getting plenty of dietary potassium.      Foods high in potassium include: bananas, oranges, cantaloupe, honey dew, apricots, grapefruit, cooked spinach, broccoli, potatoes, sweet potatoes, mushrooms, peas. Cucumbers, zucchini, pumpkin, and leafy greens.  Fish such as tuna, halibut, cod, trout, and rockfish are high in potassium.  Beans like lima, zayas, kidney, soy, and lentils are also good choices.  You can also consider adding \"light salt\" to your diet which contains a potassium supplement.    "

## 2022-10-27 NOTE — PROGRESS NOTES
Durga is a 37 year old who is being evaluated via a billable video visit.      How would you like to obtain your AVS? MyChart  If the video visit is dropped, the invitation should be resent by: Text to cell phone: [phone]  Will anyone else be joining your video visit? No        Assessment & Plan     Benign essential hypertension  She elected to start medication today. Systolic has been fairly well controlled, but diastolic elevated consistently.  We will start hydrochlorothiazide today and she will continue to check BP at home.  She will send in a message in a month or so with her home BP readings and plans to schedule a BMP in 2-3 months.  We discussed high potassium foods and BP goals.  Also discussed modifiable risk factors.    - hydrochlorothiazide (HYDRODIURIL) 12.5 MG tablet; Take 1 tablet (12.5 mg) by mouth daily  - Basic metabolic panel  (Ca, Cl, CO2, Creat, Gluc, K, Na, BUN); Future    Prescription drug management             No follow-ups on file.    ORAL Espinosa [facility]    [place] is a 37 year old, presenting for the following health issues:  No chief complaint on file.      History of Present Illness       Reason for visit:  Medication     She eats 2-3 servings of fruits and vegetables daily.She consumes 0 sweetened beverage(s) daily.She exercises with enough effort to increase her heart rate 30 to 60 minutes per day.  She exercises with enough effort to increase her heart rate 3 or less days per week.   She is taking medications regularly.     At last apt, discussed BP medications.  BP running a little high.  Has been trying to lower it with some lifestyle modifications.  Lately has been under a lot more stress.  She has been checking at home and has decided she would like to start medication.      BP average 140's/90's.      Review of Systems   Constitutional, HEENT, cardiovascular, pulmonary, gi and gu systems are negative, except  as otherwise noted.      Objective           Vitals:  No vitals were obtained today due to virtual visit.    Physical Exam   GENERAL: Healthy, alert and no distress  EYES: Eyes grossly normal to inspection.  No discharge or erythema, or obvious scleral/conjunctival abnormalities.  RESP: No audible wheeze, cough, or visible cyanosis.  No visible retractions or increased work of breathing.    SKIN: Visible skin clear. No significant rash, abnormal pigmentation or lesions.  NEURO: Cranial nerves grossly intact.  Mentation and speech appropriate for age.  PSYCH: Mentation appears normal, affect normal/bright, judgement and insight intact, normal speech and appearance well-groomed.                Video-Visit Details    Video Start Time: 0702    Type of service:  Video Visit    Video End Time:0716    Originating Location (pt. Location): Home    Distant Location (provider location):  On-site    Platform used for Video Visit: Марина

## 2022-11-04 ENCOUNTER — ALLIED HEALTH/NURSE VISIT (OUTPATIENT)
Dept: FAMILY MEDICINE | Facility: CLINIC | Age: 37
End: 2022-11-04
Payer: COMMERCIAL

## 2022-11-04 DIAGNOSIS — Z23 IMMUNIZATION DUE: Primary | ICD-10-CM

## 2022-11-04 PROCEDURE — 90713 POLIOVIRUS IPV SC/IM: CPT

## 2022-11-04 PROCEDURE — 99207 PR NO CHARGE NURSE ONLY: CPT

## 2022-11-04 PROCEDURE — 90471 IMMUNIZATION ADMIN: CPT

## 2022-12-14 ENCOUNTER — TELEPHONE (OUTPATIENT)
Dept: SURGERY | Facility: CLINIC | Age: 37
End: 2022-12-14

## 2022-12-14 NOTE — TELEPHONE ENCOUNTER
Spoke to patient reminding them to fill out the new patient questionnaire before their appointment.    Tayler SOLOMON MA

## 2022-12-15 ENCOUNTER — TELEPHONE (OUTPATIENT)
Dept: SURGERY | Facility: CLINIC | Age: 37
End: 2022-12-15

## 2022-12-15 ENCOUNTER — VIRTUAL VISIT (OUTPATIENT)
Dept: SURGERY | Facility: CLINIC | Age: 37
End: 2022-12-15
Attending: FAMILY MEDICINE
Payer: COMMERCIAL

## 2022-12-15 VITALS — WEIGHT: 258 LBS | BODY MASS INDEX: 41.46 KG/M2 | HEIGHT: 66 IN

## 2022-12-15 DIAGNOSIS — Z90.3 HISTORY OF SLEEVE GASTRECTOMY: ICD-10-CM

## 2022-12-15 DIAGNOSIS — E66.01 MORBID OBESITY (H): ICD-10-CM

## 2022-12-15 DIAGNOSIS — E66.01 MORBID OBESITY (H): Primary | ICD-10-CM

## 2022-12-15 DIAGNOSIS — K91.2 POSTSURGICAL MALABSORPTION: ICD-10-CM

## 2022-12-15 DIAGNOSIS — R03.0 ELEVATED BLOOD PRESSURE READING WITHOUT DIAGNOSIS OF HYPERTENSION: ICD-10-CM

## 2022-12-15 DIAGNOSIS — Z98.84 BARIATRIC SURGERY STATUS: ICD-10-CM

## 2022-12-15 PROCEDURE — 99205 OFFICE O/P NEW HI 60 MIN: CPT | Mod: 95 | Performed by: PHYSICIAN ASSISTANT

## 2022-12-15 PROCEDURE — 97802 MEDICAL NUTRITION INDIV IN: CPT | Mod: 95 | Performed by: DIETITIAN, REGISTERED

## 2022-12-15 NOTE — PROGRESS NOTES
"Durga is a 37 year old who is being evaluated via a billable video visit.      If the video visit is dropped, the invitation should be resent by: Text to cell phone: [phone]  Will anyone else be joining your video visit? No      Video-Visit Details    Type of service:  Video Visit    Video Start Time: 9:06    Video End Time: 10:01      60 minutes spent on the date of the encounter doing chart review, review of test results, patient visit and documentation       Originating Location (pt. Location): Home    Distant Location (provider location):  Home    Platform used for Video Visit: [employer] [facility] Weight Management Consult        PATIENT:  Durga Flannery  MRN:         [id number]  :         1985  DANI:         12/15/2022        Dear Сергей Massey CNP,    I had the pleasure of seeing your patient, Durga Flannery. Full intake/assessment was done to determine barriers to weight loss success and develop a treatment plan. Durga Flannery is a 37 year old female interested in treatment of medical problems associated with excess weight. She has a height of 5' 5.75\"[pt reported[, a weight of 258 lbs 0 oz, and the calculated Body mass index is 41.96 kg/m .       Had Vertical Gastric Sleeve  in [facility]. Has been following up with primary care physician.  Down roughly 100 lbs since surgery. Lowest weight since surgery was 225 lbs. Has no real concerns regarding surgery.     Met with dietitian last year from bariatric program. Was given information about vitamins but does not regularly take them          ASSESSMENT/PLAN:  Class 3 severe obesity due to excess calories with Body Mass Index (BMI) of 40  Bariatric Status  Hypertension      Goals:  Get labs  Have breakfast beny  Decrease alcohol      Discussed the 24 week program    We discussed the role of pharmacological agents in the treatment of obesity and the \"off-label\" use of medications in this practice. We discussed the " "risks and benefits of each. We discussed indications, contraindications, potential side effects, and estimated costs of each. Discussed that medications must always be used together with lifestyle changes such as improvements in diet choices, portion control and establishing and maintaining a regular exercise program.       Will hold off on phentermine due to HTN  Will hold off on topamax would need birth control    Discussed Saxenda. Reviewed side effects. Patient information provided. RX sent to pharmacy    Patient is asked to make a nurse only visit to get height, weight, blood pressure and pulse      Follow up end of January with MTM and provider end of March.          She has the following co-morbidities:       12/14/2022   I have the following health issues associated with obesity: High Blood Pressure   I have the following symptoms associated with obesity: Fatigue       Patient is taking the following bariatric postoperative vitamins:  1 Complete multivitamins with minerals (at different times than calcium) - takes 2-3 times daily   96504 Int Units of Vitamin D does take daily   500 mg of calcium Once daily - taking 2-3 days weekly  2500 mcg of Vitamin B12 sl daily  B complex takes 2-3 times weekly  1 Iron/Vit C. Daily and vitamin C - just started in the past 3 months due to lab work          Patient Goals 12/14/2022   I am interested in having a healthier weight to diminish current health problems: Yes   I am interested in having a healthier weight in order to prevent future health problems: Yes   I am interested in having a healthier weight in order to have a future surgery: Yes   If yes, please indicate which surgery? Vsg       Referring Provider 12/14/2022   Please name the provider who referred you to Medical Weight Management.  If you do not know, please answer: \"I Don't Know\". I don t know       Weight History 12/14/2022   How concerned are you about your weight? Very Concerned   Would you describe " your weight gain as gradual? Yes   I became overweight: As a Child   The following factors have contributed to my weight gain:  Eating Too Much, Stress, Other   Please list the other factors.  Binge eating, grief   I have tried the following methods to lose weight: Watching Portions or Calories, Exercise, Weight Watchers, Atkins-type Diet (Low Carb/High Protein), Weight Loss Surgery, Fasting   My lowest weight since age 17 was: 189   My highest weight since age 17 was: 360   The most weight I have ever lost was: (lbs) 135   I have the following family history of obesity/being overweight:  My mother is overweight, One or more of my siblings are overweight   Has anyone in your family had weight loss surgery? No   How has your weight changed over the last year?  Gained   How many pounds? 30       Up at 7 am.  Does not eat breakfast. Yesterday ate at 11:30 am - had japanese sweet potato and steak - made at home.   Around 4 pm had korean burrito wrap with rice beef and cilantro   After working out had a sandwich with carrots, radish, steak, jalapeno in a Armenian bread wrap. Had 2/3 rd bag popcorn in the evening    Diet Recall Review with Patient 12/14/2022   Do you typically eat breakfast? No   Do you typically eat lunch? Yes   If you do eat lunch, what types of food do you typically eat?  Leftovers of meat and vegetables   Do you typically eat supper? Yes   If you do eat supper, what types of food do you typically eat? Meat and vegetables   Do you typically eat snacks? Yes   If you do snack, what types of food do you typically eat? Popcorn, fiber one bars   Do you like vegetables?  Yes   Do you drink water? Yes                   32 oz water   How many glasses of juice do you drink in a typical day? 0   How many of glasses of milk do you drink in a typical day? 0.5   If you do drink milk, what type? Whole   How many 8oz glasses of sugar containing drinks such as Jarrell-Aid/sweet tea do you drink in a day? 0   How many  cans/bottles of sugar pop/soda/tea/sports drinks do you drink in a day? 0   How many cans/bottles of diet pop/soda/tea or sports drink do you drink in a day? 1                   2 cups of coffee with creamer   How often do you have a drink of alcohol? 2-3 TImes a Week   - Drinks 4-8 glasses of wine weekly   If you do drink, how many drinks might you have in a day? 1 or 2       Eating Habits 12/14/2022   Generally, my meals include foods like these: bread, pasta, rice, potatoes, corn, crackers, sweet dessert, pop, or juice. Less Than Weekly   Generally, my meals include foods like these: fried meats, brats, burgers, french fries, pizza, cheese, chips, or ice cream. Less Than Weekly   Eat fast food (like McDonalds, [employer], [employer]). Less Than Weekly   Eat at a buffet or sit-down restaurant. Less Than Weekly   Eat most of my meals in front of the TV or computer. Almost Everyday   Often skip meals, eat at random times, have no regular eating times. A Few Times a Week   Rarely sit down for a meal but snack or graze throughout.  A Few Times a Week   Eat extra snacks between meals. A Few Times a Week   Eat most of my food at the end of the day. Almost Everyday   Eat in the middle of the night or wake up at night to eat. Never   Eat extra snacks to prevent or correct low blood sugar. Never   Eat to prevent acid reflux or stomach pain. Never   Worry about not having enough food to eat. Never   Have you been to the food shelf at least a few times this year? No   I eat when I am depressed. Once a Week   I eat when I am stressed. A Few Times a Week   I eat when I am bored. A Few Times a Week   I eat when I am anxious. A Few Times a Week   I eat when I am happy or as a reward. A Few Times a Week   I feel hungry all the time even if I just have eaten. Less Than Weekly   Feeling full is important to me. A Few Times a Week   I finish all the food on my plate even if I am already full. Once a Week   I can't resist eating  delicious food or walk past the good food/smell. Less Than Weekly   I eat/snack without noticing that I am eating. Less Than Weekly   I eat when I am preparing the meal. Less Than Weekly   I eat more than usual when I see others eating. Less Than Weekly   I have trouble not eating sweets, ice cream, cookies, or chips if they are around the house. Less Than Weekly   I think about food all day. Less Than Weekly   What foods, if any, do you crave? Sweets/Candy/Chocolate   Please list any other foods you crave? Bread       Amount of Food 12/14/2022   I make myself vomit what I have eaten or use laxatives to get rid of food. Makes self vomit maybe 4 times year. Last time was about 2 months ago when ate too many carbs and was uncomfortable so made self vomit.     I eat a large amount of food, like a loaf of bread, a box of cookies, a pint/quart of ice cream, all at once. Never   I eat a large amount of food even when I am not hungry. No    I eat rapidly. Monthly   I eat alone because I feel embarrassed and do not want others to see how much I have eaten. Monthly   I eat until I am uncomfortably full. Monthly   I feel bad, disgusted, or guilty after I overeat. Monthly   I make myself vomit what I have eaten or use laxatives to get rid of food. Monthly       Activity/Exercise History 12/14/2022   How much of a typical 12 hour day do you spend sitting? Most of the Day   How much of a typical 12 hour day do you spend lying down? Less Than Half the Day   How much of a typical day do you spend walking/standing? Less Than Half the Day   How many hours (not including work) do you spend on the TV/Video Games/Computer/Tablet/Phone? 2-3 Hours   How many times a week are you active for the purpose of exercise? 4-5 TImes a Week   What keeps you from being more active? Lack of Time, Too tired   How many total minutes do you spend doing some activity for the purpose of exercising when you exercise? More Than 30 Minutes     Lifts  weights.  When nice outside will walk 3 miles daily. In the winter less cardio but will do for 15-30 minutes.       PAST MEDICAL HISTORY:  Past Medical History:   Diagnosis Date     Hypertension        Work/Social History Reviewed With Patient 12/14/2022   My employment status is: Full-Time   My job is:    How much of your job is spent on the computer or phone? 100%   How many hours do you spend commuting to work daily?  0-10minutes   What is your marital status? Single   If in a relationship, is your significant other overweight? N/A   Do you have children? No   If you have children, are they overweight? N/A   Who do you live with?  N/a   Are they supportive of your health goals? No   Who does the food shopping?  Me       Mental Health History Reviewed With Patient 12/14/2022   Have you ever been physically or sexually abused? No   If yes, do you feel that the abuse is affecting your weight? N/A   If yes, would you like to talk to a counselor about the abuse? N/A   How often in the past 2 weeks have you felt little interest or pleasure in doing things? For Several Days   Over the past 2 weeks how often have you felt down, depressed, or hopeless? For Several Days       Sleep History Reviewed With Patient 12/14/2022   How many hours do you sleep at night? 6   Do you think that you snore loudly or has anybody ever heard you snore loudly (louder than talking or so loud it can be heard behind a shut door)? Yes   Has anyone seen or heard you stop breathing during your sleep? No   Do you often feel tired, fatigued, or sleepy during the day? No   Do you have a TV/Computer in your bedroom? Yes     ROS  General  NO NSAID use  No nicotine use  Fatigue: No  Sleep Quality: OK  Birth Control: No  HEENT  Hx of glaucoma: No  Vision changes: No  Cardiovascular  Chest Pain with Exertion: No  - Does have elevated blood pressure     BP Readings from Last 6 Encounters:   09/30/22 (!) 128/90   08/04/22 (!) 150/102  "  08/01/22 132/84   07/27/22 (!) 149/96   02/16/22 132/88   07/13/21 134/81       Palpitations: No  Hx of heart disease: No  Hx of PVD No  Pulmonary  Shortness of breath at rest: No  Shortness of breath with exertion: No  Snoring: Yes   Gastrointestinal  Heartburn: No  Abdominal pain: No  History of pancreatitis: No  Severe constipation: No  Hx of bowel obstruction: No  No nausea, vomiting or diarrhea  Gastrourinary  History of kidney stones: No  Psychiatric  Moods Stable: Yes  History of drug abuse: No  No history of eating disorder  Endocrine  Polydipsia: No  Polyuria: No  Personal or family history of thyroid cancer: No  Neurologic:  Hx of seizures: No  Hx of paresthesias: No      MEDICATIONS:   Current Outpatient Medications   Medication Sig Dispense Refill     cetirizine (ZYRTEC) 10 MG tablet Take 1 tablet (10 mg) by mouth daily 30 tablet 0     hydrochlorothiazide (HYDRODIURIL) 12.5 MG tablet Take 1 tablet (12.5 mg) by mouth daily 90 tablet 1     Multiple Vitamins-Minerals (HAIR SKIN AND NAILS FORMULA PO)        propranolol (INDERAL) 10 MG tablet TAKE 1 TABLET (10 MG) BY MOUTH 3 TIMES DAILY AS NEEDED (ANXIETY PANIC ATTACK) 270 tablet 0       ALLERGIES:   Allergies   Allergen Reactions     Seasonal Allergies        PHYSICAL EXAM:  Ht 5' 5.75\" (1.67 m)   Wt 258 lb (117 kg)   BMI 41.96 kg/m    GENERAL: Healthy, alert and no distress  EYES: Eyes grossly normal to inspection.  No discharge or erythema, or obvious scleral/conjunctival abnormalities.  RESP: No audible wheeze, cough, or visible cyanosis.  No visible retractions or increased work of breathing.    SKIN: Visible skin clear. No significant rash, abnormal pigmentation or lesions.  NEURO: Cranial nerves grossly intact.  Mentation and speech appropriate for age.  PSYCH: Mentation appears normal, affect normal/bright, judgement and insight intact, normal speech and appearance well-groomed.        Sincerely,    Marcin Aaron PA-C      "

## 2022-12-15 NOTE — PATIENT INSTRUCTIONS
Emmanuel Calixto,    It was nice seeing you today.  Thank you for sharing your struggles with food.  Here are the goals you determined:     Focus on mindfulness with volume of food (aim for 1 cup)   Start vitamins- see below    Vitamin Requirements    Complete multivitamin and mineral (2 per day)    Calcium with vitamin D    Calcium citrate preferred source   Take 600 mg 2 times per day or 500 mg 3 times per day   Take 2 hours apart from complete multivitamin and mineral    5000 international unit(s) vitamin D    500 mcg B12 daily (sublingual -put under your tongue) or 1000 mcg injection monthly    12 mg thiamine per day    Or 50 mg 2 times per week   Or 100 mg 1 time per week    Iron -1 daily   1 Vitron C or   1 -325 mg ferrous sulfate and 1 tablet 500 mg vitamin C       Handouts that you may find helpful:     Mindful Eating  [email or web address]     Building a Healthy Relationship with Food  [email or web address]    Please call [phone] to schedule your next appointment  in 4-8 weeks.    Thanks,    Gregory Lauren, RD, LD  [facility] Outpatient Dietitian[facility]   [phone] (office phone)

## 2022-12-15 NOTE — PROGRESS NOTES
"Video-Visit Details    Type of service:  Video Visit    Video Start Time (time video started): 10:02    Video End Time (time video stopped): 10:30    Originating Location (pt. Location): Home        Distant Location (provider location):  Off-site    Mode of Communication:  Video Conference via [facility] WEIGHT LOSS INITIAL EVALUATION  DIAGNOSIS:  Obese, class III     NUTRITION HISTORY:  Breakfast: Skips   Lunch: Korean burrito   Dinner: Stephen sandwich- beef carrots, cilantro and radish   Snacks: popcorn; fiber one bar   Beverage choices: water; alcohol   Dining out: orders when stressed   Binge eating: no  Emotional eating: yes   Night time eating: no   Exercise: 4-5 times per week 15-30 minutes cardio + strength daily     Other: Patient feel she is a stress eater.  Patient tries to avoid bread and rice, tries \"clean eating\" but will order out when stressed.  Patient states eating bread is a reward for stressful day.  Patient states may binge eat but upon further discussed overeating throughout the day.  Patient does not eat rapidly. Patient can eat 1.5 cups on food.  Patient with previous sleeve gastrectomy in [facility]. See RD note 4/2022 for more details.  Patient tries to avoid liquids with meals.  Patient is not taking vitamins consistently.  Per PA-C, patient taking 5000 vitamin D 3 per day.      MEDICATIONS:  Saxenda     ANTHROPOMETRICS:  Height: 5'5.75\"  Weight: 258 lbs   BMI: 41.9 kg/m2  NUTRITION DIAGNOSIS:   Obese, class III related to excess energy intake as evidence by BMI of 41.9 kg/m2   NUTRITION INTERVENTIONS  Nutrition Prescription:  Recommend modified energy- nutrient intake  Implementation:  Nutrition Education (Content):    Discussed sleeve gastrectomy diet guidelines     Determined alternative to emotional eating    Reviewed healthy snacking and beverage choices    Provided   Mindful Eating and Changing Your Relationship with Food\" handouts    Nutrition Education (Application): "     Patient to practice goals as stated below    Patient verbalizes understanding of diet by stating will focus on mindfulness     Expected patient engagement: fair-good     Goals: (Patient determined)   Focus on mindfulness with volume of food (aim for 1 cup)   Start vitmains     FOLLOW UP AND MONITORING:   Other  - follow up in 4-8 weeks.     TIME SPENT WITH PATIENT:  28 minutes   Gregory Kessler, RD, LD  [facility] Out[facility] Dietitian/[facility] Loss [facility]   [phone] (office phone)

## 2022-12-15 NOTE — TELEPHONE ENCOUNTER
PA Initiation    Medication: liraglutide - Weight Management (SAXENDA) 18 MG/3ML pen   Insurance Company: [employer] - Phone [phone] Fax [phone]  Pharmacy Filling the Rx: [id number] - [place], [place] -  [address]  Filling Pharmacy Phone: [phone]  Filling Pharmacy Fax: [phone]  Start Date: 12/15/2022

## 2022-12-15 NOTE — PATIENT INSTRUCTIONS
"Nice to talk with you today. Thank you for allowing me the privilege of caring for you. We hope we provided you with the excellent service you deserve.     To ensure the quality of our services you may receive a patient satisfaction survey from an independent monitoring company.  The greatest compliment you can give is \"Likely to Recommend\"    Below is our plan we discussed.-  CECI Burch        Patient is asked to make a nurse only visit to get height, weight, blood pressure and pulse       You should be receiving a call to schedule with a PharmD for the end of January. You may also schedule the appointment by calling [phone] or toll-free at [phone].    Please schedule a follow up with Marcin Aaron PA-C for late March by calling [phone].  If you need to reach me sooner you can do so by calling [phone].    Have a great day!       Goals:  Get labs  Have breakfast beny  Decrease alcohol        Bariatric Post Op Guidelines      General:    To avoid marginal ulcers avoid all forms of tobacco, alcohol in excess, caffeine, and NSAIDS (unless indicated for cardioprotection or othewise and opposed by a PPI).    Exercise is key for continued weight loss and weight maintenance. Aim for 30-60 minutes of physical activity most days of the week. Include cardiovascular and strength training.    Continue lifelong vitamins supplementation and annual lab follow up.  All  patients should supplement with the following bariatric postoperative vitamins:  2 Complete multivitamins with minerals (at different times than calcium)  Vitamin D 5000 Int Units/125 mg daily   Calcium 600 mg twice daily or 500 mg three times daily   Vitamin B12: 500 mcg sl daily or 1000 mcg Inj monthly  B complex daily or Thiamine 100 mg weekly  1 Iron/Vit C. Daily for females who menstruate and/or as directed    The bariatric team should be aware and evaluate all adverse gastrointestinal symptoms which can be a sign of complication. " Inability to tolerate textured solid food (chicken, steak, fish) may need to be evaluated by endoscopy.    There is a 10% increase of Alcohol Use Disorder in patients with bariatric surgery.   Most often occurring around 2 years post op.  Please call the clinic if you feel alcohol is interfering in your daily life.  We can help.     Follow up annually lifelong. Obesity is a chronic disease.  Weight gain can be expected. The goal of follow-up visits is to ensure adequate vitamin and protein absorption, evaluate food intake behavior, review exercise/activity level, and assist with weight regain.    Nutritional:  Eat 3 meals per day  (No snacks between meals.)  Do not skip meals.  This can cause overeating at the next meal and will prevent adequate protein and nutritional intake.    Aim for 60-80 grams of protein per day.  Always eat your protein first. This assists with optimal nutrition and helps you stay full longer.    Eat your protein first, and then follow with fiber.    Add fiber by including fruits, vegetables, whole grains, and beans.     Portions should be about 1 cup per meal. Use measuring cups to be accurate.  Continue to use saucer/salad plates, infant/toddler silverware to keep portion sizes small and take small bites.    Eat S-L-O-W-L-Y to make each meal last 20-30 minutes. Always stop eating when satisfied.    Aim for 64 oz. of calorie-free fluids daily.    Avoid drinking 30 min before, during, and 30 min after meal    Avoid high sugar and high fat foods to prevent high calorie intake. This will reduce your rate of weight loss and can cause weight regain.   Check nutrition labels for less than 10 grams of sugar and less than 10 grams of fat per serving.      MEDICATION STARTED AT THIS APPOINTMENT    We are starting a GLP-1 (Glucagon-like Peptide-1) medication called Saxenda. One of the ways it works is by slowing down the rate that food leaves your stomach. You feel carreno and will eat less. It also  helps regulate hormones that can help improve your blood sugars.    If you are a patient that checks blood sugars, continue to check as instructed by your doctor. Low blood sugars are rare but can happen if patients are on insulin or other oral agents. If you notice consistent low sugars or high sugars, your medication may need to be adjusted after your appointment. If this is the case, please call RN and provide her your blood sugar record from the last 3-4 days. The RN will get in touch with the doctor and call you back/[employer] message with recommendations. We tolerate high sugars for a bit, so if sugars are running 180-200, this is ok. As weight starts dropping the blood sugars should too. If readings are consistently over 200 for 1-2 weeks, then you should call the doctor/nurse.    Dosing for this medication:   Week 1- Inject 0.6 mg daily  Week 2- Inject 1.2 mg daily  Week 3- Inject 1.8 mg daily  Week 4- Inject 2.4 mg daily  Week 5 and thereafter- Inject 3.0 mg daily    Side effects of GLP- Medications include: The most common side effects are all GI related and consist of: nausea, constipation, diarrhea, burping, or gassiness. Patients are advised to eat slowly and less, and nausea typically passes if people can stick it out.     The risk of pancreatitis (inflammation of the pancreas) has been associated with this type of medication, but is very rare.  If you have had pancreatitis in the past, this medication may not be for you. Please let us know about any past history of pancreas problems.    Symptoms of pancreatitis include: Pain in your upper stomach area which may travel to your back and be worse after eating. Your stomach area may be tender to the touch.  You may have vomiting or nausea and/or have a fever. If you should develop any of these symptoms, stop the medication and contact your primary care doctor. They will do a blood test to check for pancreatitis.         There is a small chance you may have  "some low blood sugar after taking the medication.   The signs of low blood sugar are:  Weakness  Shaky   Hungry  Sweating  Confusion      See below for ways to treat low blood sugar without adding in lots of extra calories.      Treating Low Blood Sugar    If you have symptoms of low blood sugar (sweating, shaking, dizzy, confused) eat 15 grams of carbs and wait 15 minutes:    Glucose Tabs are best for sugars under 70 -  Dex4 or BD Glucose tablets are good, you will need to take 3-4 of these to equal 15 grams.     One small box of raisins  4 oz fruit juice box or   cup fruit juice  1 small apple  1 small banana    cup canned fruit in water    English muffin or a slice of bread with jelly   1 low fat frozen waffle with sugar-free syrup    cup cottage cheese with   cup frozen or fresh blueberries  1 cup skim or low-fat milk    cup whole grain cereal  4-6 crackers such as Triscuits      This medication is usually not covered by insurance and can be quite expensive. Sometimes a prior authorization is required, which may take up to 1-2 weeks for an insurance company to make a decision if they will cover the medication. Please be patient, you will be notified after a decision has been made.    Contact the nurse via whistleBox or call [phone] if you have any questions or concerns. (Do not stop taking it if you don't think it's working. For some people it works without them knowing it.)     In order to get refills of this or any medication we prescribe you must be seen in the medical weight mgmt clinic every 2-4 months.        If you have access to a computer, you can get the Saxenda savings coupon by following the below information:     1. Go to this [employer] website: [email or web address]    2. Click on \"get your card here\"    3. Select the button next to \"I need a new card or a replacement card\"    4.  Select the button next to \"No, I am not enrolled\" regarding the \"Are you enrolled in any " "government, state, or federally funded medical or prescription benefit programs? (Examples include but are not limited to Medicare, Medigap, VA, DOD, , or any similar federal or state health care program.)\" question.     5. Select \"Yes\" for the \"Do you have commercial (also known as private) insurance that covers your prescription? (Example: Insurance provided through an employer)\" question.     6. Follow the prompts and fill out your information, then press \"next\".    7. The site will construct a savings coupon card for you. You will be asked if you would like the card printed, emailed, etc and select the option you prefer. You can always take a picture of the card that it shows you.     8. Bring the savings card information to your pharmacy and they will use the savings card to reduce your copay.          "

## 2022-12-15 NOTE — TELEPHONE ENCOUNTER
Prior Authorization Retail Medication Request    Medication/Dose: liraglutide - Weight Management (SAXENDA) 18 MG/3ML pen  ICD code (if different than what is on RX):    Previously Tried and Failed:  Phentermine contraindicated due to elevated BP  Rationale:  Wt loss    Insurance Name:  [employer]  Insurance ID:  [id number]      Pharmacy Information (if different than what is on RX)  Name:    Phone:

## 2022-12-16 NOTE — TELEPHONE ENCOUNTER
PRIOR AUTHORIZATION DENIED    Medication: liraglutide - Weight Management (SAXENDA) 18 MG/3ML pen--DENIED    Denial Date: 12/16/2022    Denial Rational: Excluded    Appeal Information:

## 2023-01-09 DIAGNOSIS — E66.01 MORBID OBESITY (H): Primary | ICD-10-CM

## 2023-01-09 RX ORDER — BUPROPION HYDROCHLORIDE 100 MG/1
TABLET ORAL
Qty: 180 TABLET | Refills: 1 | Status: SHIPPED | OUTPATIENT
Start: 2023-01-09 | End: 2023-03-20

## 2023-01-20 ENCOUNTER — ALLIED HEALTH/NURSE VISIT (OUTPATIENT)
Dept: FAMILY MEDICINE | Facility: CLINIC | Age: 38
End: 2023-01-20
Payer: COMMERCIAL

## 2023-01-20 ENCOUNTER — LAB (OUTPATIENT)
Dept: LAB | Facility: CLINIC | Age: 38
End: 2023-01-20
Payer: COMMERCIAL

## 2023-01-20 VITALS
SYSTOLIC BLOOD PRESSURE: 120 MMHG | OXYGEN SATURATION: 98 % | BODY MASS INDEX: 42.33 KG/M2 | HEART RATE: 86 BPM | RESPIRATION RATE: 17 BRPM | WEIGHT: 263.4 LBS | TEMPERATURE: 98.2 F | DIASTOLIC BLOOD PRESSURE: 88 MMHG | HEIGHT: 66 IN

## 2023-01-20 DIAGNOSIS — E66.01 MORBID OBESITY (H): ICD-10-CM

## 2023-01-20 DIAGNOSIS — Z98.84 BARIATRIC SURGERY STATUS: ICD-10-CM

## 2023-01-20 DIAGNOSIS — Z23 ENCOUNTER FOR IMMUNIZATION: Primary | ICD-10-CM

## 2023-01-20 DIAGNOSIS — K91.2 POSTSURGICAL MALABSORPTION: ICD-10-CM

## 2023-01-20 LAB — PTH-INTACT SERPL-MCNC: 28 PG/ML (ref 15–65)

## 2023-01-20 PROCEDURE — 84590 ASSAY OF VITAMIN A: CPT | Mod: 90

## 2023-01-20 PROCEDURE — 90713 POLIOVIRUS IPV SC/IM: CPT

## 2023-01-20 PROCEDURE — 83970 ASSAY OF PARATHORMONE: CPT

## 2023-01-20 PROCEDURE — 99207 PR NO CHARGE NURSE ONLY: CPT

## 2023-01-20 PROCEDURE — 99000 SPECIMEN HANDLING OFFICE-LAB: CPT

## 2023-01-20 PROCEDURE — 90471 IMMUNIZATION ADMIN: CPT

## 2023-01-20 PROCEDURE — 36415 COLL VENOUS BLD VENIPUNCTURE: CPT

## 2023-01-23 LAB
ANNOTATION COMMENT IMP: NORMAL
RETINYL PALMITATE SERPL-MCNC: <0.02 MG/L
VIT A SERPL-MCNC: 0.49 MG/L

## 2023-02-08 ENCOUNTER — VIRTUAL VISIT (OUTPATIENT)
Dept: PHARMACY | Facility: CLINIC | Age: 38
End: 2023-02-08
Attending: PHYSICIAN ASSISTANT

## 2023-02-08 DIAGNOSIS — E66.01 MORBID OBESITY (H): Primary | ICD-10-CM

## 2023-02-08 PROCEDURE — 99207 PR NO CHARGE LOS: CPT | Performed by: PHARMACIST

## 2023-02-08 NOTE — PROGRESS NOTES
"  Disease State Management Encounter:                          Durga Flannery is a 38 year old female called for for an initial visit. She was referred to me from Marcin Aaron PA-C. Insurance does not cover comprehensive medication review with Medication Therapy Management (MTM). Patient declines private pay. Therefore, completed one time consult today.     Reason for visit: alternative weight loss medication(s) options.    Obesity:   No medications    Has previously been prescribed Saxenda last year, but was not covered due to excluded benefit. However, patient reports that with new year that she believes that weight loss medication(s) may not be covered.   Patient had sleeve gastrectomy in 2015 in [facility]. She is down roughly 100 lb since surgery, lowest since surgery was 225 lb, then issues of weight regain. Reports she doesn't regularly take her vitamins.  She does not complain of acid reflux. She does not have issues with swallowing food/pills. Interested in Wegovy. No personal or family history of MTC or MEN2, no history of pancreatitis.   Followed by Marcin Aaron PA-C, seen 12/15/2022 for New Medical Weight Management.   Diet/Eating Habits: Patient reports she tries to get in 3 meals per day but most often will miss breakfast. Trying to decrease alcohol intake.  Working with dietitian, last seen 12/15/2022.   Exercise/Activity: Did not discuss today  Tried/Failed/Contraindicated Medications: Phentermine: HTN; Topiramate: not on birth control, not appropriate; Saxenda: not covered last year.     Wt Readings from Last 4 Encounters:   01/20/23 263 lb 6.4 oz (119.5 kg)   12/15/22 258 lb (117 kg)   09/30/22 258 lb 3.2 oz (117.1 kg)   08/04/22 257 lb 4.8 oz (116.7 kg)     Estimated body mass index is 42.84 kg/m  as calculated from the following:    Height as of 1/20/23: 5' 5.75\" (1.67 m).    Weight as of 1/20/23: 263 lb 6.4 oz (119.5 kg).      Hemoglobin   Date Value Ref Range Status   08/01/2022 14.4 11.7 - " 15.7 g/dL Final   11/06/2020 13.7 11.7 - 15.7 g/dL Final     Ferritin   Date Value Ref Range Status   09/30/2022 11 (L) 12 - 150 ng/mL Final   11/06/2020 19 12 - 150 ng/mL Final     Lab Results   Component Value Date    VITDT 35 09/30/2022     Lab Results   Component Value Date    PTHI 28 01/20/2023     Lab Results   Component Value Date    B12 579 09/30/2022     Lab Results   Component Value Date    ROSANGELA 0.49 01/20/2023     Assessment/Plan:    Due to potential insurance formulary changes, reasonable to consider GLP1 agonist for weight loss again. As patient is preferring to try once weekly versus once daily GLP1 agonist as Wegovy stock availability issues are now improved. Will prescribe Wegovy.     Follow-up: 6 weeks from Wegovy start with Marcin Aaron PA-C. Call [phone] to schedule.     I spent 20 minutes with this patient today. All changes were made via collaborative practice agreement with Marcin Aaron PA-C. A copy of the visit note was provided to the patient's provider(s).    A summary of these recommendations was declined by the patient.    Lauren Bloch, PharmD, BCACP   Medication Therapy Management Pharmacist   [facility]       Medication Therapy Recommendations  Morbid obesity (H)    Rationale: Untreated condition - Needs additional medication therapy - Indication   Recommendation: Start Medication - Wegovy 0.25 MG/0.5ML Soaj   Status: Accepted per CPA

## 2023-02-19 ENCOUNTER — TELEPHONE (OUTPATIENT)
Dept: SURGERY | Facility: CLINIC | Age: 38
End: 2023-02-19
Payer: COMMERCIAL

## 2023-02-19 RX ORDER — SEMAGLUTIDE 0.25 MG/.5ML
0.25 INJECTION, SOLUTION SUBCUTANEOUS WEEKLY
Qty: 2 ML | Refills: 0 | Status: SHIPPED | OUTPATIENT
Start: 2023-02-19 | End: 2023-04-27 | Stop reason: DRUGHIGH

## 2023-02-19 NOTE — TELEPHONE ENCOUNTER
"Appears that prescriptions for Wegovy were not received to pharmacy. Therefore, re-sent Wegovy 0.25 mg and 0.5 mg RX to patient's pharmacy. Also starting Prior Authorization for Wegovy.     MTM PA REQUEST    Prior Authorization Retail Medication Request    Prior Authorization Retail Medication Request    Medication/Dose: Wegovy  ICD code (if different than what is on RX):  Same as on RX  Previously Tried and Failed: diet and exercise alone without successful sustainable weight loss.   Rationale:  Weight loss medication(s) are indicated due to patient having a BMI of at least 30 kg/m2 . Phentermine is contraindicated due to hypertension. Topiramate is contraindicated due to no viable contraceptive on board. Therefore, requesting coverage of Wegovy.     Estimated body mass index is 42.84 kg/m  as calculated from the following:    Height as of 1/20/23: 5' 5.75\" (1.67 m).    Weight as of 1/20/23: 263 lb 6.4 oz (119.5 kg).    Insurance Name:  [employer] Lauren Bloch, PharmD, BCACP   Medication Therapy Management Pharmacist   [facility]        "

## 2023-02-19 NOTE — PATIENT INSTRUCTIONS
Recommendations from today's disease management visit:                                                      Will see if Wegovy is covered - will start Prior Authorization process - pharmacy to let team know if Prior Authorization is necessary - will wait to hear from them.     Follow-up: 6 weeks from Wegovy start with Marcin Aaron PA-C, Call [phone] to schedule.       To schedule another MTM appointment, please call the clinic directly or you may call the MTM scheduling line at [phone] or toll-free at [phone].     My Clinical Pharmacist's contact information:                                                      Please feel free to contact me with any questions or concerns you have.      Lauren Bloch, PharmD, BCACP   Medication Therapy Management Pharmacist   [facility]

## 2023-02-20 NOTE — TELEPHONE ENCOUNTER
PA needed for Wegovy. Please submit PA and let Liaison know when Approved / denied    UPLAN  BIN: [id number]  ID: [id number]  GROUP: [id number]  PCN: KEYON

## 2023-02-23 NOTE — TELEPHONE ENCOUNTER
PA Initiation    Medication: Semaglutide-Weight Management (WEGOVY) 0.25 MG/0.5ML SOAJ   Insurance Company: [employer] - Phone [phone] Fax [phone]  Pharmacy Filling the Rx: [id number] - [place], [place] - [address] [place] [address] [address]  Filling Pharmacy Phone: [phone]  Filling Pharmacy Fax: [phone]  Start Date: 2/22/2023

## 2023-02-23 NOTE — TELEPHONE ENCOUNTER
Prior Authorization Approval    Authorization Effective Date: 2/23/2023  Authorization Expiration Date: 2/23/2024  Medication: Semaglutide-Weight Management (WEGOVY) 0.25 MG/0.5ML SOAJ--APPROVED  Approved Dose/Quantity:   Reference #:     Insurance Company: [employer] - Phone [phone] Fax [phone]  Expected CoPay:       CoPay Card Available:      Foundation Assistance Needed:    Which Pharmacy is filling the prescription (Not needed for infusion/clinic administered): [id number] - [place], [place] - [address] [place] [address]  Pharmacy Notified: Yes  Patient Notified: Yes **Instructed pharmacy to notify patient when script is ready to /ship.**

## 2023-03-20 ENCOUNTER — OFFICE VISIT (OUTPATIENT)
Dept: FAMILY MEDICINE | Facility: CLINIC | Age: 38
End: 2023-03-20
Payer: COMMERCIAL

## 2023-03-20 VITALS
HEART RATE: 68 BPM | RESPIRATION RATE: 16 BRPM | SYSTOLIC BLOOD PRESSURE: 150 MMHG | BODY MASS INDEX: 43.07 KG/M2 | TEMPERATURE: 97.7 F | HEIGHT: 66 IN | WEIGHT: 268 LBS | OXYGEN SATURATION: 100 % | DIASTOLIC BLOOD PRESSURE: 92 MMHG

## 2023-03-20 DIAGNOSIS — I10 BENIGN ESSENTIAL HYPERTENSION: ICD-10-CM

## 2023-03-20 DIAGNOSIS — F32.1 CURRENT MODERATE EPISODE OF MAJOR DEPRESSIVE DISORDER, UNSPECIFIED WHETHER RECURRENT (H): ICD-10-CM

## 2023-03-20 DIAGNOSIS — Z11.3 SCREEN FOR STD (SEXUALLY TRANSMITTED DISEASE): ICD-10-CM

## 2023-03-20 DIAGNOSIS — Z00.00 ROUTINE GENERAL MEDICAL EXAMINATION AT A HEALTH CARE FACILITY: Primary | ICD-10-CM

## 2023-03-20 DIAGNOSIS — E66.813 CLASS 3 SEVERE OBESITY WITH SERIOUS COMORBIDITY AND BODY MASS INDEX (BMI) OF 40.0 TO 44.9 IN ADULT, UNSPECIFIED OBESITY TYPE (H): ICD-10-CM

## 2023-03-20 DIAGNOSIS — E66.01 CLASS 3 SEVERE OBESITY WITH SERIOUS COMORBIDITY AND BODY MASS INDEX (BMI) OF 40.0 TO 44.9 IN ADULT, UNSPECIFIED OBESITY TYPE (H): ICD-10-CM

## 2023-03-20 DIAGNOSIS — Z13.220 LIPID SCREENING: ICD-10-CM

## 2023-03-20 LAB
ALBUMIN SERPL BCG-MCNC: 4.1 G/DL (ref 3.5–5.2)
ALP SERPL-CCNC: 62 U/L (ref 35–104)
ALT SERPL W P-5'-P-CCNC: 13 U/L (ref 10–35)
ANION GAP SERPL CALCULATED.3IONS-SCNC: 9 MMOL/L (ref 7–15)
AST SERPL W P-5'-P-CCNC: 30 U/L (ref 10–35)
BILIRUB SERPL-MCNC: 0.4 MG/DL
BUN SERPL-MCNC: 16 MG/DL (ref 6–20)
CALCIUM SERPL-MCNC: 9.3 MG/DL (ref 8.6–10)
CHLORIDE SERPL-SCNC: 103 MMOL/L (ref 98–107)
CHOLEST SERPL-MCNC: 150 MG/DL
CREAT SERPL-MCNC: 0.8 MG/DL (ref 0.51–0.95)
CREAT UR-MCNC: 47.1 MG/DL
DEPRECATED HCO3 PLAS-SCNC: 26 MMOL/L (ref 22–29)
GFR SERPL CREATININE-BSD FRML MDRD: >90 ML/MIN/1.73M2
GLUCOSE SERPL-MCNC: 85 MG/DL (ref 70–99)
HDLC SERPL-MCNC: 32 MG/DL
LDLC SERPL CALC-MCNC: 110 MG/DL
MICROALBUMIN UR-MCNC: <12 MG/L
MICROALBUMIN/CREAT UR: NORMAL MG/G{CREAT}
NONHDLC SERPL-MCNC: 118 MG/DL
POTASSIUM SERPL-SCNC: 4.6 MMOL/L (ref 3.4–5.3)
PROT SERPL-MCNC: 7.1 G/DL (ref 6.4–8.3)
SODIUM SERPL-SCNC: 138 MMOL/L (ref 136–145)
TRIGL SERPL-MCNC: 38 MG/DL

## 2023-03-20 PROCEDURE — 87591 N.GONORRHOEAE DNA AMP PROB: CPT | Performed by: FAMILY MEDICINE

## 2023-03-20 PROCEDURE — 80053 COMPREHEN METABOLIC PANEL: CPT | Performed by: FAMILY MEDICINE

## 2023-03-20 PROCEDURE — 86780 TREPONEMA PALLIDUM: CPT | Performed by: FAMILY MEDICINE

## 2023-03-20 PROCEDURE — 87491 CHLMYD TRACH DNA AMP PROBE: CPT | Performed by: FAMILY MEDICINE

## 2023-03-20 PROCEDURE — 82570 ASSAY OF URINE CREATININE: CPT | Performed by: FAMILY MEDICINE

## 2023-03-20 PROCEDURE — 87389 HIV-1 AG W/HIV-1&-2 AB AG IA: CPT | Performed by: FAMILY MEDICINE

## 2023-03-20 PROCEDURE — 36415 COLL VENOUS BLD VENIPUNCTURE: CPT | Performed by: FAMILY MEDICINE

## 2023-03-20 PROCEDURE — 82043 UR ALBUMIN QUANTITATIVE: CPT | Performed by: FAMILY MEDICINE

## 2023-03-20 PROCEDURE — 80061 LIPID PANEL: CPT | Performed by: FAMILY MEDICINE

## 2023-03-20 PROCEDURE — 99213 OFFICE O/P EST LOW 20 MIN: CPT | Mod: 25 | Performed by: FAMILY MEDICINE

## 2023-03-20 PROCEDURE — 99395 PREV VISIT EST AGE 18-39: CPT | Performed by: FAMILY MEDICINE

## 2023-03-20 RX ORDER — HYDROCHLOROTHIAZIDE 25 MG/1
25 TABLET ORAL DAILY
Qty: 90 TABLET | Refills: 1 | Status: SHIPPED | OUTPATIENT
Start: 2023-03-20 | End: 2023-09-29

## 2023-03-20 ASSESSMENT — ENCOUNTER SYMPTOMS
JOINT SWELLING: 0
SHORTNESS OF BREATH: 0
FEVER: 0
HEMATOCHEZIA: 0
ARTHRALGIAS: 0
PALPITATIONS: 0
SORE THROAT: 0
COUGH: 0
BREAST MASS: 0
CONSTIPATION: 0
ABDOMINAL PAIN: 0
FREQUENCY: 0
WEAKNESS: 0
DIARRHEA: 0
NERVOUS/ANXIOUS: 1
DYSURIA: 0
MYALGIAS: 0
DIZZINESS: 0
HEARTBURN: 0
NAUSEA: 0
PARESTHESIAS: 0
HEADACHES: 0
CHILLS: 0
EYE PAIN: 0
HEMATURIA: 0

## 2023-03-20 ASSESSMENT — PATIENT HEALTH QUESTIONNAIRE - PHQ9
SUM OF ALL RESPONSES TO PHQ QUESTIONS 1-9: 7
SUM OF ALL RESPONSES TO PHQ QUESTIONS 1-9: 7
10. IF YOU CHECKED OFF ANY PROBLEMS, HOW DIFFICULT HAVE THESE PROBLEMS MADE IT FOR YOU TO DO YOUR WORK, TAKE CARE OF THINGS AT HOME, OR GET ALONG WITH OTHER PEOPLE: SOMEWHAT DIFFICULT

## 2023-03-20 ASSESSMENT — PAIN SCALES - GENERAL: PAINLEVEL: NO PAIN (0)

## 2023-03-20 NOTE — PROGRESS NOTES
SUBJECTIVE:   CC: Durga is an 38 year old who presents for preventive health visit.   Patient has been advised of split billing requirements and indicates understanding: Yes  Healthy Habits:     Getting at least 3 servings of Calcium per day:  Yes    Bi-annual eye exam:  NO    Dental care twice a year:  Yes    Sleep apnea or symptoms of sleep apnea:  None    Diet:  Carbohydrate counting and Breakfast skipped    Frequency of exercise:  4-5 days/week    Duration of exercise:  45-60 minutes    Taking medications regularly:  Yes    Medication side effects:  None    PHQ-2 Total Score: 2    Additional concerns today:  Yes    Other concerns:  Needs biometric screen completed for work.    HTN, not checking BP regularly at home. On hydrochlorothiazide, no side effects on medication.    Takes propranolol prn for anxiety.    BP Readings from Last 6 Encounters:   03/20/23 (!) 150/92   01/20/23 120/88   09/30/22 (!) 128/90   08/04/22 (!) 150/102   08/01/22 132/84   07/27/22 (!) 149/96     Today's PHQ-2 Score:   PHQ-2 ( 1999 [employer]) 3/20/2023   Q1: Little interest or pleasure in doing things 1   Q2: Feeling down, depressed or hopeless 1   PHQ-2 Score 2   PHQ-2 Total Score (12-17 Years)- Positive if 3 or more points; Administer PHQ-A if positive -   Q1: Little interest or pleasure in doing things Several days   Q2: Feeling down, depressed or hopeless Several days   PHQ-2 Score 2       Have you ever done Advance Care Planning? (For example, a Health Directive, POLST, or a discussion with a medical provider or your loved ones about your wishes): Yes, patient states has an Advance Care Planning document and will bring a copy to the clinic.    Social History     Tobacco Use     Smoking status: Never     Smokeless tobacco: Never   Substance Use Topics     Alcohol use: Yes     Comment: 3/ week         Alcohol Use 3/20/2023   Prescreen: >3 drinks/day or >7 drinks/week? No     Reviewed orders with patient.  Reviewed health  "maintenance and updated orders accordingly - Yes      Breast Cancer Screening:    Breast CA Risk Assessment (FHS-7) 2/13/2022 9/30/2022 9/30/2022   Do you have a family history of breast, colon, or ovarian cancer? Yes No / Unknown No / Unknown       Patient under 40 years of age: Routine Mammogram Screening not recommended.   Pertinent mammograms are reviewed under the imaging tab.    History of abnormal Pap smear: NO - age 30-65 PAP every 5 years with negative HPV co-testing recommended  PAP / HPV Latest Ref Rng & Units 3/16/2021   PAP (Historical) - NIL   HPV16 NEG:Negative Negative   HPV18 NEG:Negative Negative   HRHPV NEG:Negative Negative     Reviewed and updated as needed this visit by clinical staff   Tobacco  Allergies  Meds              Reviewed and updated as needed this visit by Provider                     Review of Systems   Constitutional: Negative for chills and fever.   HENT: Negative for congestion, ear pain, hearing loss and sore throat.    Eyes: Negative for pain and visual disturbance.   Respiratory: Negative for cough and shortness of breath.    Cardiovascular: Negative for chest pain, palpitations and peripheral edema.   Gastrointestinal: Negative for abdominal pain, constipation, diarrhea, heartburn, hematochezia and nausea.   Breasts:  Negative for tenderness, breast mass and discharge.   Genitourinary: Negative for dysuria, frequency, genital sores, hematuria, pelvic pain, urgency, vaginal bleeding and vaginal discharge.   Musculoskeletal: Negative for arthralgias, joint swelling and myalgias.   Skin: Negative for rash.   Neurological: Negative for dizziness, weakness, headaches and paresthesias.   Psychiatric/Behavioral: Negative for mood changes. The patient is nervous/anxious.           OBJECTIVE:   BP (!) 150/92 (BP Location: Right arm, Patient Position: Sitting, Cuff Size: Adult Large)   Pulse 68   Temp 97.7  F (36.5  C) (Tympanic)   Resp 16   Ht 1.67 m (5' 5.75\")   Wt 121.6 kg " (268 lb)   LMP 03/19/2023 (Approximate)   SpO2 100%   BMI 43.59 kg/m    Physical Exam  GENERAL: healthy, alert and no distress  EYES: Eyes grossly normal to inspection, PERRL and conjunctivae and sclerae normal  HENT: nose and mouth without ulcers or lesions  NECK: no adenopathy, no asymmetry, masses, or scars and thyroid normal to palpation  RESP: lungs clear to auscultation - no rales, rhonchi or wheezes  CV: regular rate and rhythm, normal S1 S2, no S3 or S4, no murmur, click or rub, no peripheral edema and peripheral pulses strong  ABDOMEN: soft, nontender, no hepatosplenomegaly, no masses and bowel sounds normal  MS: no gross musculoskeletal defects noted, no edema  SKIN: no suspicious lesions or rashes  NEURO: Normal strength and tone, mentation intact and speech normal  PSYCH: mentation appears normal, affect normal/bright        ASSESSMENT/PLAN:   Durga was seen today for physical.    Diagnoses and all orders for this visit:    Routine general medical examination at a health care facility  -     REVIEW OF HEALTH MAINTENANCE PROTOCOL ORDERS    Screen for STD (sexually transmitted disease)  -     NEISSERIA GONORRHOEA PCR  -     CHLAMYDIA TRACHOMATIS PCR  -     HIV Antigen Antibody Combo  -     Treponema Abs w Reflex to RPR and Titer    Lipid screening  -     Lipid panel reflex to direct LDL Non-fasting    Benign essential hypertension  -Not well controlled. Discussed increasing hydrochlorothiazide to 25 mg daily. Follow-up in two weeks for MA BP check.  -     hydrochlorothiazide (HYDRODIURIL) 25 MG tablet; Take 1 tablet (25 mg) by mouth daily  -     Albumin Random Urine Quantitative with Creat Ratio  -     Comprehensive metabolic panel    Current moderate episode of major depressive disorder, unspecified whether recurrent (H)  -Stable, not currently on medication    Class 3 severe obesity with serious comorbidity and body mass index (BMI) of 40.0 to 44.9 in adult, unspecified obesity type (H)  -Following  with weight management clinic  -On semaglutide          COUNSELING:  Reviewed preventive health counseling, as reflected in patient instructions       Regular exercise       Healthy diet/nutrition        She reports that she has never smoked. She has never used smokeless tobacco.      Hernandez Torres DO  [facility]

## 2023-03-21 LAB
C TRACH DNA SPEC QL NAA+PROBE: NEGATIVE
HIV 1+2 AB+HIV1 P24 AG SERPL QL IA: NONREACTIVE
N GONORRHOEA DNA SPEC QL NAA+PROBE: NEGATIVE
T PALLIDUM AB SER QL: NONREACTIVE

## 2023-04-20 ENCOUNTER — ALLIED HEALTH/NURSE VISIT (OUTPATIENT)
Dept: FAMILY MEDICINE | Facility: CLINIC | Age: 38
End: 2023-04-20
Payer: COMMERCIAL

## 2023-04-20 VITALS — DIASTOLIC BLOOD PRESSURE: 94 MMHG | SYSTOLIC BLOOD PRESSURE: 124 MMHG

## 2023-04-20 DIAGNOSIS — I10 BENIGN ESSENTIAL HYPERTENSION: Primary | ICD-10-CM

## 2023-04-20 PROCEDURE — 99207 PR NO CHARGE NURSE ONLY: CPT

## 2023-04-20 NOTE — PROGRESS NOTES
Durga Flannery is a 38 year old year old patient who comes in today for a Blood Pressure check because of medication change and ongoing blood pressure monitoring.    Vital Signs as repeated by RN:  124/90  124/94    Patient is taking medication as prescribed  Patient is tolerating medications well.  Patient is not monitoring Blood Pressure at home.     Current complaints: none     Disposition: Patient to continue with the same medication. Routed results to Provider.    Kinza Montanez RN  [facility]

## 2023-04-26 ENCOUNTER — TELEPHONE (OUTPATIENT)
Dept: FAMILY MEDICINE | Facility: CLINIC | Age: 38
End: 2023-04-26
Payer: COMMERCIAL

## 2023-04-26 NOTE — TELEPHONE ENCOUNTER
Forms/Letter Request    Type of form/letter: Biometric Screening Form    Have you been seen for this request: Yes 03/20/23    Do we have the form/letter: Yes: Found completed outgoing [facility] CARE TEAM 2  Copy kept in clinic  Copy sent to abstract    Who is the form from? Patient    Where did/will the form come from? Patient or family brought in       When is form/letter needed by: N/A    How would you like the form/letter returned: Fax : [phone]

## 2023-04-27 ENCOUNTER — VIRTUAL VISIT (OUTPATIENT)
Dept: PHARMACY | Facility: CLINIC | Age: 38
End: 2023-04-27
Payer: COMMERCIAL

## 2023-04-27 DIAGNOSIS — E66.01 MORBID OBESITY (H): Primary | ICD-10-CM

## 2023-04-27 PROCEDURE — 99207 PR NO CHARGE LOS: CPT | Performed by: PHARMACIST

## 2023-04-27 NOTE — PROGRESS NOTES
"  Disease State Management Encounter:                          Durga Flannery is a 38 year old female called for for an initial visit. She was referred to me from Marcin Aaron PA-C. Insurance does not cover comprehensive medication review with Medication Therapy Management (MTM). Patient declines private pay. Therefore, completed one time consult today.     Reason for visit: alternative weight loss medication(s) options.    Obesity:   Wegovy 0.5 mg weekly     Followed by Marcin Aaron PA-C, seen 12/15/2022 for [facility] Weight Management. Patient is experiencing the follow side effects: None. Patient had sleeve gastrectomy in 2015 in [facility]. She is down roughly 100 lb since surgery, lowest since surgery was 225 lb, then issues of weight regain. Reports the last week she has been traveling so has been traveling as eating out which is atypical. Reports she was supposed to take Wegovy on Sunday but ran out so was not able to take.   Diet/Eating Habits: Patient reports she has been trying to be more mindful with nutrition. Trying to be conscientious about protein and fiber. She wants to try to work harder about caloric intake.  She doesn't drink calories.   Exercise/Activity: Patient reports weight lifting 4-5 times per week. Now that it is warmer out she is planning on implementing more cardio.   Tried/Failed/Contraindicated Medications: Phentermine: HTN; Topiramate: not on birth control, not appropriate; Saxenda: not covered last year.     Current weight today: 258 lb     Wt Readings from Last 4 Encounters:   03/20/23 268 lb (121.6 kg)   01/20/23 263 lb 6.4 oz (119.5 kg)   12/15/22 258 lb (117 kg)   09/30/22 258 lb 3.2 oz (117.1 kg)     Estimated body mass index is 43.59 kg/m  as calculated from the following:    Height as of 3/20/23: 5' 5.75\" (1.67 m).    Weight as of 3/20/23: 268 lb (121.6 kg).    Assessment/Plan:    Patient would benefit from increase Wegovy to 1 mg weekly. Discussed risk versus benefit of " increasing dose when she is < 1 week overdue for injection. She is aware of risk and wishes to pursue increasing dose to 1 mg weekly. RX Sent to pharmcy.     1. Increase Wegovy to 1 mg weekly.     Follow-up: 6 weeks with Marcin Aaron PA-C. Call [phone] to schedule.     I spent 20 minutes with this patient today. All changes were made via collaborative practice agreement with Marcin Aaron PA-C. A copy of the visit note was provided to the patient's provider(s).    A summary of these recommendations was declined by the patient.    Lauren Bloch, PharmD, BCACP   Medication Therapy Management Pharmacist   [facility]       Medication Therapy Recommendations  Morbid obesity (H)    Current Medication: Semaglutide-Weight Management (WEGOVY) 0.25 MG/0.5ML SOAJ (Discontinued)   Rationale: Dose too low - Dosage too low - Effectiveness   Recommendation: Increase Dose - Wegovy 1 MG/0.5ML Soaj   Status: Accepted per CPA

## 2023-04-27 NOTE — PATIENT INSTRUCTIONS
Recommendations from today's disease management visit:                                                        1. Increase Wegovy to 1 mg weekly.     Follow-up: 6 weeks with Marcin Aaron PA-C. Call [phone] to schedule.     To schedule another MTM appointment, please call the clinic directly or you may call the MTM scheduling line at [phone] or toll-free at [phone].     My Clinical Pharmacist's contact information:                                                      Please feel free to contact me with any questions or concerns you have.      Lauren Bloch, PharmD, BCACP   Medication Therapy Management Pharmacist   [facility] [place]

## 2023-05-31 DIAGNOSIS — E66.01 MORBID OBESITY (H): Primary | ICD-10-CM

## 2023-05-31 RX ORDER — SEMAGLUTIDE 1.7 MG/.75ML
1.7 INJECTION, SOLUTION SUBCUTANEOUS
Qty: 3 ML | Refills: 1 | Status: SHIPPED | OUTPATIENT
Start: 2023-05-31 | End: 2023-10-26

## 2023-07-19 ENCOUNTER — OFFICE VISIT (OUTPATIENT)
Dept: OPTOMETRY | Facility: CLINIC | Age: 38
End: 2023-07-19
Payer: COMMERCIAL

## 2023-07-19 ENCOUNTER — TELEPHONE (OUTPATIENT)
Dept: SURGERY | Facility: CLINIC | Age: 38
End: 2023-07-19
Payer: COMMERCIAL

## 2023-07-19 DIAGNOSIS — H52.03 LATENT HYPEROPIA OF BOTH EYES: Primary | ICD-10-CM

## 2023-07-19 ASSESSMENT — REFRACTION_MANIFEST
OD_SPHERE: -0.25
OD_CYLINDER: +0.75
OS_CYLINDER: +0.50
OD_AXIS: 175
OS_SPHERE: +0.50
OS_AXIS: 015
OS_ADD: +1.00
OD_ADD: +1.00

## 2023-07-19 ASSESSMENT — CONF VISUAL FIELD
OS_INFERIOR_TEMPORAL_RESTRICTION: 0
OS_INFERIOR_NASAL_RESTRICTION: 0
OD_SUPERIOR_TEMPORAL_RESTRICTION: 0
OS_SUPERIOR_NASAL_RESTRICTION: 0
OD_SUPERIOR_NASAL_RESTRICTION: 0
METHOD: COUNTING FINGERS
OD_NORMAL: 1
OD_INFERIOR_NASAL_RESTRICTION: 0
OD_INFERIOR_TEMPORAL_RESTRICTION: 0
OS_NORMAL: 1
OS_SUPERIOR_TEMPORAL_RESTRICTION: 0

## 2023-07-19 ASSESSMENT — VISUAL ACUITY
OD_SC+: -1
OS_SC+: -1
OS_SC: 20/40
OD_SC: 20/20
METHOD: SNELLEN - LINEAR

## 2023-07-19 ASSESSMENT — TONOMETRY
OD_IOP_MMHG: 20
IOP_METHOD: ICARE
OS_IOP_MMHG: 20

## 2023-07-19 ASSESSMENT — EXTERNAL EXAM - LEFT EYE: OS_EXAM: NORMAL

## 2023-07-19 ASSESSMENT — CUP TO DISC RATIO
OS_RATIO: 0.25
OD_RATIO: 0.25

## 2023-07-19 ASSESSMENT — REFRACTION
OD_SPHERE: +1.00
OS_CYLINDER: +0.50
OS_AXIS: 015
OD_AXIS: 175
OS_SPHERE: +1.75
OD_CYLINDER: +0.75

## 2023-07-19 ASSESSMENT — SLIT LAMP EXAM - LIDS
COMMENTS: NORMAL
COMMENTS: NORMAL

## 2023-07-19 ASSESSMENT — EXTERNAL EXAM - RIGHT EYE: OD_EXAM: NORMAL

## 2023-07-19 NOTE — PROGRESS NOTES
A/P  1.) Latent Hyperopia left eye>right eye  -Largely doing well uncorrected, wearing glasses prn but several years old  -Moderate degree of latent hyperopia, suggesting accommodative spasm for any distance blur  -Rx updated, can continue to use prn if desired. Reviewed expected upcoming changes with presbyopia/hyperopia  -Dilated ocular health unremarkable OU    Monitor 1-2 years comprehensive, sooner prn    I have confirmed the patient's CC, HPI and reviewed Past Medical History, Past Surgical History, Social History, Family History, Problem List, Medication List and agree with Tech note.     Juanita Weeks, OD FAAO FSLS

## 2023-07-20 ENCOUNTER — VIRTUAL VISIT (OUTPATIENT)
Dept: SURGERY | Facility: CLINIC | Age: 38
End: 2023-07-20
Payer: COMMERCIAL

## 2023-07-20 VITALS — HEIGHT: 66 IN | WEIGHT: 258 LBS | BODY MASS INDEX: 41.46 KG/M2

## 2023-07-20 DIAGNOSIS — E66.01 MORBID OBESITY (H): Primary | ICD-10-CM

## 2023-07-20 PROCEDURE — 99213 OFFICE O/P EST LOW 20 MIN: CPT | Mod: VID | Performed by: PHYSICIAN ASSISTANT

## 2023-07-20 NOTE — PROGRESS NOTES
"Durga is a 38 year old who is being evaluated via a billable video visit.      The patient has been notified of following:     \"This video visit will be conducted via a call between you and your physician/provider. We have found that certain health care needs can be provided without the need for an in-person physical exam.  This service lets us provide the care you need with a video conversation.  If a prescription is necessary we can send it directly to your pharmacy.  If lab work is needed we can place an order for that and you can then stop by our lab to have the test done at a later time.    Video visits are billed at different rates depending on your insurance coverage.  Please reach out to your insurance provider with any questions.    If during the course of the call the physician/provider feels a video visit is not appropriate, you will not be charged for this service.\"    Patient has given verbal consent for Video visit? Yes    How would you like to obtain your AVS? MyChart    If the video visit is dropped, the invitation should be resent by: Text to cell phone: [phone]    Will anyone else be joining your video visit? No    I    Video-Visit Details    Type of service:  Video Visit    Video Start Time: 2:34    Video End Time: 2:49    Originating Location (pt. Location): Home    Distant Location (provider location):  Home     Platform used for Video Visit: [facility] Medical Weight Management Note         Durga Flannery  MRN:  [id number]  :  1985  DANI:  2023        Dear ORAL Carpenter CNP,    I had the pleasure of seeing your patient Durga Flannery. She is a 38 year old female who I am continuing to see for treatment of obesity related to:        2022    10:15 PM   --   I have the following health issues associated with obesity High Blood Pressure   I have the following symptoms associated with obesity Fatigue           Assessment   Problem List Items Addressed This " "Visit     Morbid obesity (H) - Primary     Continue Wegovy         Relevant Orders    Basic metabolic panel          PLAN/DISCUSSION:  1.  Set up a nurse only visit at the closest [facility] to get weight, blood pressure and pulse taken. Please ask that they forward the results. - Scheduled tomorrow to see primary   2. Get lab drawn   3. Increase cardio in a addition to weight lifting  4. Upon review of labs and vitals will send over 3 month supply of Wegovy 2.4 mg        FOLLOW-UP:   3 months      SUBJECTIVE/OBJECTIVE:  Seen initially 12/15/2022.  Visit notes below:    \"Had Vertical Gastric Sleeve 2015 in [facility]. Has been following up with primary care physician.  Down roughly 100 lbs since surgery. Lowest weight since surgery was 225 lbs. Has no real concerns regarding surgery. \"    Was prescribed Saxenda but unfortunately was denied by insurance.  Patient was eventually started on Wegovy 2/2023 and current dose is 1.7 mg and has been on it for over a month.  Gives injection on Saturday mornings. Has noticed appetite suppression but not a lot of weight loss. Does lift weights 4-5 days per week    Previous Goals:  Get labs   Have breakfast daily- Not completed  Decrease alcohol - completed      Anti-obesity medications:   Current: Wegovy 1.7 mg     Side effects:  Denies vomiting, abdominal pain, severe constipation, diarrhea, or heartburn. Does get some nausea when gives injection.  Also notices not drinking alcohol really at all.        Recent diet changes:  2-3 meals daily. Does not typically eat breakfast not usually hungry. Does not typically snack. Drinking 30-45 oz water daily    Recent exercise/activity changes: Lifts weight 4-5 times weekly. Trying to go on daily walks. 2 miles      CURRENT WEIGHT:   258 lbs 0 oz    Initial Weight (lbs): 258 lbs  Last Visits Weight: 258 lb (117 kg)  Cumulative weight loss (lbs): 0  Weight Loss Percentage: 0%      MEDICATIONS:   Current Outpatient " "Medications   Medication Sig Dispense Refill     hydrochlorothiazide (HYDRODIURIL) 25 MG tablet Take 1 tablet (25 mg) by mouth daily 90 tablet 1     Multiple Vitamins-Minerals (HAIR SKIN AND NAILS FORMULA PO)        propranolol (INDERAL) 10 MG tablet TAKE 1 TABLET (10 MG) BY MOUTH 3 TIMES DAILY AS NEEDED (ANXIETY PANIC ATTACK) 270 tablet 0     Semaglutide-Weight Management (WEGOVY) 1.7 MG/0.75ML pen Inject 1.7 mg Subcutaneous every 7 days 3 mL 1     WEGOVY 1 MG/0.5ML pen INJECT 1 MG SUBCUTANEOUS ONCE A WEEK 2 mL 0         ROS:  General  Fatigue: No  Sleep Quality: OK      PHYSICAL EXAM:  Objective    Ht 5' 5.75\" (1.67 m)   Wt 258 lb (117 kg)   BMI 41.96 kg/m    GENERAL: Healthy, alert and no distress  EYES: Eyes grossly normal to inspection.  No discharge or erythema, or obvious scleral/conjunctival abnormalities.  RESP: No audible wheeze, cough, or visible cyanosis.  No visible retractions or increased work of breathing.    SKIN: Visible skin clear. No significant rash, abnormal pigmentation or lesions.  NEURO: Cranial nerves grossly intact.  Mentation and speech appropriate for age.  PSYCH: Mentation appears normal, affect normal/bright, judgement and insight intact, normal speech and appearance well-groomed.        Sincerely,    Marcin Aaron PA-C    25 minutes spent on the date of the encounter doing chart review, review of test results, patient visit and documentation                             "

## 2023-07-20 NOTE — PATIENT INSTRUCTIONS
"Nice to talk with you today! Thank you for allowing me the privilege of caring for you.   We hope we provided you with the excellent service you deserve.     To ensure the quality of our services you may receive a patient satisfaction survey from an independent monitoring company.  The greatest compliment you can give is \"Likely to Recommend\"      Below is our plan we discussed.-  CECI Burch      1.  Set up a nurse only visit at the closest [facility] to get weight, blood pressure and pulse taken. Please ask that they forward the results. - Scheduled tomorrow to see primary   2. Get lab drawn   3. Increase cardio in a addition to weight lifting      Please call [phone] and schedule a follow up with Marcin Aaron PA-C in 3 months.  If you need to reach me sooner you can do so by calling [phone].    Have a great day!       Eat Better ? Move More ? Live Well    Eat 3 nutrient-rich meals each day    Don t skip meals--it will cause you to overeat later in the day!    Eating fiber (vegetables/fruits/whole grains) and protein with meals helps you stay full longer    Choose foods with less than 10 grams of sugar and 5 grams of fat per serving to prevent excess calories and weight re-gain  Eat around the same times each day to develop a routine eating schedule   Avoid snacking unless physically hungry.   Planned snacks: 1-2 times per day and no more than 150 calories    Eat protein first   Protein helps with healing, maintaining adequate muscle mass, reducing hunger and optimizing nutritional status   Aim for 60-80 grams of protein per day   Fill up on Fiber   Fiber comes from plants--fruits, veggies, whole grains, nuts/seeds and beans   Fiber is low in calories, high in phytonutrients and helps you stay full longer   Aim for 25-35 grams per day by eating fiber with meals and snacks  Eat S-[id number]   Take 20-30 minutes to eat each meal by taking small bites, chewing foods to applesauce consistency or 20-30 " times before you swallow   Eating foods too fast can delay satiety/fullness signals and increase overeating   Slow down your eating by using toddler utensils, putting your fork/spoon down between bites and not watching TV or emailing during meals!   Keep a Journal         Writing down what you eat, how you feel and when you are active helps you identify new changes to work on from week to week         Look for ways to cut 100 calories from your current diet 2-3 times per day  Drink 64 ounces of 0-Calorie drinks between meals   Water   Zero calorie Propel  or Vitamin Water     SoBe Lifewater  Zero Calories   Crystal Light , Sugar-Free Jarrell-Aid , and other sugar-free lemonade or flavored miles   Keep Caffeine to less than 300mg per day ie: 3-6oz cups coffee     Work up to 45-60 minutes of physical activity most days of the week   Helps with losing weight and prevent regaining those extra pounds!    Do a combo of cardio (walking/water exercises) and strength training (lifting weights/Vinyasa yoga)    Avoid Mindless Eating   Be present when you eat--take note of the smell, taste and quality of your food   Make a list of alternative activities you could do to prevent eating out of boredom/stress  Go for a walk, call a friend, chew gum, paint your nails, re-organize the garage, etc

## 2023-07-21 ENCOUNTER — ALLIED HEALTH/NURSE VISIT (OUTPATIENT)
Dept: FAMILY MEDICINE | Facility: CLINIC | Age: 38
End: 2023-07-21
Payer: COMMERCIAL

## 2023-07-21 VITALS
SYSTOLIC BLOOD PRESSURE: 136 MMHG | TEMPERATURE: 97.9 F | RESPIRATION RATE: 18 BRPM | BODY MASS INDEX: 41.96 KG/M2 | HEART RATE: 82 BPM | WEIGHT: 261.1 LBS | OXYGEN SATURATION: 100 % | HEIGHT: 66 IN | DIASTOLIC BLOOD PRESSURE: 88 MMHG

## 2023-07-21 DIAGNOSIS — Z23 NEED FOR VACCINATION: Primary | ICD-10-CM

## 2023-07-21 LAB
ANION GAP SERPL CALCULATED.3IONS-SCNC: 10 MMOL/L (ref 7–15)
BUN SERPL-MCNC: 10.9 MG/DL (ref 6–20)
CALCIUM SERPL-MCNC: 9.6 MG/DL (ref 8.6–10)
CHLORIDE SERPL-SCNC: 104 MMOL/L (ref 98–107)
CREAT SERPL-MCNC: 0.81 MG/DL (ref 0.51–0.95)
DEPRECATED HCO3 PLAS-SCNC: 25 MMOL/L (ref 22–29)
GFR SERPL CREATININE-BSD FRML MDRD: >90 ML/MIN/1.73M2
GLUCOSE SERPL-MCNC: 100 MG/DL (ref 70–99)
POTASSIUM SERPL-SCNC: 4.4 MMOL/L (ref 3.4–5.3)
SODIUM SERPL-SCNC: 139 MMOL/L (ref 136–145)

## 2023-07-21 PROCEDURE — 90471 IMMUNIZATION ADMIN: CPT

## 2023-07-21 PROCEDURE — 36415 COLL VENOUS BLD VENIPUNCTURE: CPT | Mod: VID | Performed by: PHYSICIAN ASSISTANT

## 2023-07-21 PROCEDURE — 90713 POLIOVIRUS IPV SC/IM: CPT

## 2023-07-21 PROCEDURE — 80048 BASIC METABOLIC PNL TOTAL CA: CPT | Mod: VID | Performed by: PHYSICIAN ASSISTANT

## 2023-07-21 PROCEDURE — 99207 PR NO CHARGE NURSE ONLY: CPT

## 2023-07-21 NOTE — PROGRESS NOTES
"Prior to immunization administration, verified patients identity using patient s name and date of birth. Please see Immunization Activity for additional information.     Screening Questionnaire for Adult Immunization    Are you sick today?   No   Do you have allergies to medications, food, a vaccine component or latex?   No   Have you ever had a serious reaction after receiving a vaccination?   No   Do you have a long-term health problem with heart, lung, kidney, or metabolic disease (e.g., diabetes), asthma, a blood disorder, no spleen, complement component deficiency, a cochlear implant, or a spinal fluid leak?  Are you on long-term aspirin therapy?   No   Do you have cancer, leukemia, HIV/AIDS, or any other immune system problem?   No   Do you have a parent, brother, or sister with an immune system problem?   No   In the past 3 months, have you taken medications that affect  your immune system, such as prednisone, other steroids, or anticancer drugs; drugs for the treatment of rheumatoid arthritis, Crohn s disease, or psoriasis; or have you had radiation treatments?   No   Have you had a seizure, or a brain or other nervous system problem?   No   During the past year, have you received a transfusion of blood or blood    products, or been given immune (gamma) globulin or antiviral drug?   No   For women: Are you pregnant or is there a chance you could become       pregnant during the next month?   No   Have you received any vaccinations in the past 4 weeks?   No     Immunization questionnaire answers were all negative.    I have reviewed the following standing orders: Not Applicable; Plan from Сергей Massey NP note on 12/21/22     Сергей Massey -- \"Patient received 1st dose IPV 11/4/2022  -2nd dose due 1 to 2 months after first dose  -  third dose 6 to 12 months after the second dose\"    Full set of vitals taken per patient request.     Patient instructed to remain in clinic for 15 minutes afterwards, and to " report any adverse reactions.     Screening performed by Kinza Montanez RN on 7/21/2023 at 2:02 PM.

## 2023-07-25 DIAGNOSIS — E66.01 MORBID OBESITY (H): Primary | ICD-10-CM

## 2023-07-25 RX ORDER — SEMAGLUTIDE 2.4 MG/.75ML
2.4 INJECTION, SOLUTION SUBCUTANEOUS
Qty: 3 ML | Refills: 2 | Status: SHIPPED | OUTPATIENT
Start: 2023-07-25 | End: 2023-10-26

## 2023-09-29 DIAGNOSIS — I10 BENIGN ESSENTIAL HYPERTENSION: ICD-10-CM

## 2023-09-29 RX ORDER — HYDROCHLOROTHIAZIDE 25 MG/1
25 TABLET ORAL DAILY
Qty: 90 TABLET | Refills: 0 | Status: SHIPPED | OUTPATIENT
Start: 2023-09-29 | End: 2023-10-26

## 2023-09-29 NOTE — PROGRESS NOTES
"Durga is a 38 year old who is being evaluated via a billable video visit.      The patient has been notified of following:     \"This video visit will be conducted via a call between you and your physician/provider. We have found that certain health care needs can be provided without the need for an in-person physical exam.  This service lets us provide the care you need with a video conversation.  If a prescription is necessary we can send it directly to your pharmacy.  If lab work is needed we can place an order for that and you can then stop by our lab to have the test done at a later time.    Video visits are billed at different rates depending on your insurance coverage.  Please reach out to your insurance provider with any questions.    If during the course of the call the physician/provider feels a video visit is not appropriate, you will not be charged for this service.\"    Patient has given verbal consent for Video visit? Yes    How would you like to obtain your AVS? MyChart    If the video visit is dropped, the invitation should be resent by: Text to cell phone: [phone]    Will anyone else be joining your video visit? No    I    Video-Visit Details    Type of service:  Video Visit    Video Start Time: 11:05 AM    Video End Time:11:32    Originating Location (pt. Location): Other Work    Distant Location (provider location):  Home    Platform used for Video Visit: [facility] Medical Weight Management Note         Durga Flannery  MRN:  [id number]  :  1985  DANI:  10/13/2023        Dear ORAL Carpenter CNP,    I had the pleasure of seeing your patient Durga Flannery. She is a 38 year old female who I am continuing to see for treatment of obesity related to:        2022    10:15 PM   --   I have the following health issues associated with obesity High Blood Pressure   I have the following symptoms associated with obesity Fatigue           Assessment   Problem List Items " "Addressed This Visit       Morbid obesity (H) - Primary     Stop Wegovy  Start Metformin         Relevant Medications    metFORMIN (GLUCOPHAGE XR) 500 MG 24 hr tablet    PCOS (polycystic ovarian syndrome)    Relevant Medications    metFORMIN (GLUCOPHAGE XR) 500 MG 24 hr tablet          PLAN/DISCUSSION:  We discussed the role of pharmacological agents in the treatment of obesity and the \"off-label\" use of medications in this practice. We discussed the risks and benefits of each. We discussed indications, contraindications, potential side effects, and estimated costs of each. Discussed that medications must always be used together with lifestyle changes such as improvements in diet choices, portion control and establishing and maintaining a regular exercise program.     Discussed the impact of lifestyle and diet on overall weight loss. With patients weight loss on the Wegovy being only marginal will discontinue usage.  RX for metformin sent over to pharmacy.    Plan:  Set up a nurse only visit at the closest [facility] to get weight, blood pressure and pulse taken. Please ask that they forward the results.   2. Start having breakfast daily   3. Schedule annual with dietitian and provider  4. Increase water to 64 oz daily   5. Start metformin. Do not restart Wegovy     Topiramate - would need BC  Phentermine - not a candidate due to HTN  Bupropion - started but not long enough to know effect  Metformin - RX sent to pharmacy. Patient also has a history of PCOS  Wegovy - not effective      FOLLOW-UP:  Schedule bariatric annual with provider and diet for January       SUBJECTIVE/OBJECTIVE:  Patient was last seen 7/20/2023.  Initially seen 12/15/2022.   Visit notes below:  \"Had Vertical Gastric Sleeve 2015 in [facility]. Has been following up with primary care physician.  Down roughly 100 lbs since surgery. Lowest weight since surgery was 225 lbs. Has no real concerns regarding surgery. \"    Today reports " continuing with Wegovy. Weight is pretty stable. Started wegovy 2.4 in July. Had constipation and bloating. No vomiting or abdominal pain. Thinks was also losing hair. After reaching out to [facility] stopped taking about a month ago. Did not have these side effects with any of the other doses. Did just  a new prescription for the Wegovy 2.4 mg but did not start yet.      Thinks her 4 days per week strength training could be impacting weight loss as well. Also has PCOS.    Anti-obesity medications:   Current: Wegovy 2.4 mg  Side effects:  Denies nausea, vomiting, abdominal pain, severe constipation, diarrhea, or heartburn          10/13/2023    10:49 AM   Weight Loss Medication History Reviewed With Patient   Which weight loss medications are you currently taking on a regular basis? None   If you are not taking a weight loss medication that was prescribed to you, please indicate why: Other   Are you having any side effects from the weight loss medication that we have prescribed you? Yes   If you are having side effects please describe: Constipation, loose stool       Recent diet changes: up at 6 am. First meal around 11 am. Not too hungry. Will have coffee.    L: yesterday had appetizers because at a work function  Afternoon: Around 2 pm Hamburger and green beans  D: closer to 8 pm. Had cucumber and air fried chicken. With yogurt/sour cream diip    Portions sizes:1.5 cups. Tries to avoid liquids with meals  Fluids: 30-60 oz water. Closer to 30 oz 30 oz     CURRENT WEIGHT:   261 lbs 0 oz    Initial Weight (lbs): 258 lbs     Cumulative weight loss (lbs): -3  Weight Loss Percentage: -1.16%        10/13/2023    10:49 AM   Changes and Difficulties   I have made the following changes to my diet since my last visit: N/a   With regards to my diet, I am still struggling with: Consistency   I have made the following changes to my activity/exercise since my last visit: N/a   With regards to my activity/exercise, I am still  "struggling with: N/a         MEDICATIONS:   Current Outpatient Medications   Medication Sig Dispense Refill    hydrochlorothiazide (HYDRODIURIL) 25 MG tablet TAKE 1 TABLET BY MOUTH EVERY DAY 90 tablet 0    metFORMIN (GLUCOPHAGE XR) 500 MG 24 hr tablet Take 1 tab by mouth at dinner x 2 weeks.  Then can increase to 2 tabs. 180 tablet 0    Multiple Vitamins-Minerals (HAIR SKIN AND NAILS FORMULA PO)       propranolol (INDERAL) 10 MG tablet TAKE 1 TABLET (10 MG) BY MOUTH 3 TIMES DAILY AS NEEDED (ANXIETY PANIC ATTACK) 270 tablet 0    Semaglutide-Weight Management (WEGOVY) 2.4 MG/0.75ML pen Inject 2.4 mg Subcutaneous every 7 days 3 mL 2    Semaglutide-Weight Management (WEGOVY) 1.7 MG/0.75ML pen Inject 1.7 mg Subcutaneous every 7 days (Patient not taking: Reported on 10/12/2023) 3 mL 1    WEGOVY 1 MG/0.5ML pen INJECT 1 MG SUBCUTANEOUS ONCE A WEEK (Patient not taking: Reported on 10/12/2023) 2 mL 0         ROS: 10/13/2023  General  Fatigue: No  Sleep Quality: OK  HEENT  Hx of glaucoma: No  Vision changes: No  Cardiovascular  Chest Pain with Exertion: No  - Does have elevated blood pressure      BP Readings from Last 6 Encounters:   07/21/23 136/88   04/20/23 (!) 124/94   03/20/23 (!) 150/92   01/20/23 120/88   09/30/22 (!) 128/90   08/04/22 (!) 150/102       Palpitations: No  Hx of heart disease: No  Hx of PVD No  Pulmonary  Shortness of breath at rest: No  Shortness of breath with exertion: No  Snoring: Yes   Gastrointestinal  Heartburn: No  Abdominal pain: No  History of pancreatitis: No  Severe constipation: No  Hx of bowel obstruction: No  No nausea, vomiting or diarrhea  Gastrourinary  History of kidney stones: No  Psychiatric  Moods Stable: Yes  History of drug abuse: No  No history of eating disorder  Endocrine  Polydipsia: No  Polyuria: No  Personal or family history of thyroid cancer: No  Neurologic:  Hx of seizures: No  Hx of paresthesias: No  Birth control:  No           PHYSICAL EXAM:  Objective    Ht 5' 5.75\" " (1.67 m)   Wt 261 lb (118.4 kg)   BMI 42.45 kg/m    GENERAL: Healthy, alert and no distress  EYES: Eyes grossly normal to inspection.  No discharge or erythema, or obvious scleral/conjunctival abnormalities.  RESP: No audible wheeze, cough, or visible cyanosis.  No visible retractions or increased work of breathing.    SKIN: Visible skin clear. No significant rash, abnormal pigmentation or lesions.  NEURO: Cranial nerves grossly intact.  Mentation and speech appropriate for age.  PSYCH: Mentation appears normal, affect normal/bright, judgement and insight intact, normal speech and appearance well-groomed.        Sincerely,    Marcin Aaron PA-C    40 minutes spent on the date of the encounter doing chart review, review of test results, patient visit and documentation

## 2023-10-12 ENCOUNTER — IMMUNIZATION (OUTPATIENT)
Dept: FAMILY MEDICINE | Facility: CLINIC | Age: 38
End: 2023-10-12
Payer: COMMERCIAL

## 2023-10-12 VITALS — HEIGHT: 66 IN | WEIGHT: 261 LBS | BODY MASS INDEX: 41.95 KG/M2

## 2023-10-12 PROCEDURE — 90471 IMMUNIZATION ADMIN: CPT

## 2023-10-12 PROCEDURE — 90686 IIV4 VACC NO PRSV 0.5 ML IM: CPT

## 2023-10-12 PROCEDURE — 91320 SARSCV2 VAC 30MCG TRS-SUC IM: CPT

## 2023-10-12 PROCEDURE — 90480 ADMN SARSCOV2 VAC 1/ONLY CMP: CPT

## 2023-10-13 ENCOUNTER — VIRTUAL VISIT (OUTPATIENT)
Dept: SURGERY | Facility: CLINIC | Age: 38
End: 2023-10-13
Payer: COMMERCIAL

## 2023-10-13 DIAGNOSIS — E28.2 PCOS (POLYCYSTIC OVARIAN SYNDROME): ICD-10-CM

## 2023-10-13 DIAGNOSIS — E66.01 MORBID OBESITY (H): Primary | ICD-10-CM

## 2023-10-13 PROCEDURE — 99215 OFFICE O/P EST HI 40 MIN: CPT | Mod: 95 | Performed by: PHYSICIAN ASSISTANT

## 2023-10-13 RX ORDER — METFORMIN HCL 500 MG
TABLET, EXTENDED RELEASE 24 HR ORAL
Qty: 180 TABLET | Refills: 0 | Status: SHIPPED | OUTPATIENT
Start: 2023-10-13 | End: 2023-10-26

## 2023-10-13 NOTE — PATIENT INSTRUCTIONS
"Nice to talk with you today! Thank you for allowing me the privilege of caring for you.   We hope we provided you with the excellent service you deserve.     To ensure the quality of our services you may receive a patient satisfaction survey from an independent monitoring company.  The greatest compliment you can give is \"Likely to Recommend\"      Below is our plan we discussed.-  CECI Burch      Set up a nurse only visit at the closest [facility] to get weight, blood pressure and pulse taken. Please ask that they forward the results.   2. Start having breakfast daily   3. Schedule annual with dietitian and provider  4. Increase water to 64 oz daily   5. Start metformin. Do not restart Wegovy       Please call [phone] and schedule a follow up with Marcin Aaron PA-C in 3 months.  If you need to reach me sooner you can do so by calling [phone].    Have a great day!       MEDICATION STARTED AT THIS APPOINTMENT    We are starting metformin.  Starting instructions:    Extended release tablet: Take 1 tablet by mouth daily with a meal for 1 week, then increase to 2 tablets daily with a meal or 1 tablet twice daily with meals.      Call the nurse at [phone] if you have any questions or concerns.     Metformin is a medication that is used to assist with lowering blood sugars in patients that have Pre-Diabetes or Diabetes. It has also been found to help with weight loss.    Metformin helps to lower blood sugars by lowering the amount of sugar (glucose) your liver produces that would otherwise cause your blood sugar to increase. It also makes your body respond better to insulin (the product that lowers your blood sugar). It is unclear how metformin assists with weight loss.     Side-effects. Metformin is generally well tolerated. The main side-effects we see are diarrhea, nausea and stomach upset - this tends to subside over time as your body gets used to the medication. Taking this medications with food " will lower these side effects.    Low blood sugar (hypoglycemia) is rare to occur with metformin, but can occur if you do not eat enough or are taking other medications that assist to lower blood sugar. The signs of low blood sugar are:  Weakness  Shaky   Hungry  Sweating  Confusion      See below for ways to treat low blood sugar without adding in lots of extra calories.    Treating Low Blood Sugar    If you have symptoms of low blood sugar (sweating, shaking, dizzy, confused) eat 15 grams of carbs and wait 15 minutes:    Glucose Tabs are best for sugars under 70 -  Dex4 or BD Glucose tablets are good, you will need to take 3-4 of these to equal 15 grams.     One small box of raisins  4 oz fruit juice box or   cup fruit juice  1 small apple  1 small banana    cup canned fruit in water    English muffin or a slice of bread with jelly   1 low fat frozen waffle with sugar-free syrup    cup cottage cheese with   cup frozen or fresh blueberries  1 cup skim or low-fat milk    cup whole grain cereal  4-6 crackers such as Triscuits    Please refer to the pharmacy insert for more information on side-effects. Please call the clinic should you have more questions regarding this medication.      In order to get refills of this or any medication we prescribe you must be seen in the medical weight mgmt clinic every 2-3 months.

## 2023-10-26 ENCOUNTER — OFFICE VISIT (OUTPATIENT)
Dept: FAMILY MEDICINE | Facility: CLINIC | Age: 38
End: 2023-10-26
Payer: COMMERCIAL

## 2023-10-26 VITALS
DIASTOLIC BLOOD PRESSURE: 95 MMHG | SYSTOLIC BLOOD PRESSURE: 153 MMHG | BODY MASS INDEX: 43.55 KG/M2 | OXYGEN SATURATION: 95 % | TEMPERATURE: 98.4 F | RESPIRATION RATE: 18 BRPM | WEIGHT: 271 LBS | HEART RATE: 80 BPM | HEIGHT: 66 IN

## 2023-10-26 DIAGNOSIS — F41.0 PANIC ATTACK: ICD-10-CM

## 2023-10-26 DIAGNOSIS — E28.2 PCOS (POLYCYSTIC OVARIAN SYNDROME): ICD-10-CM

## 2023-10-26 DIAGNOSIS — Z11.3 SCREEN FOR STD (SEXUALLY TRANSMITTED DISEASE): Primary | ICD-10-CM

## 2023-10-26 DIAGNOSIS — I10 BENIGN ESSENTIAL HYPERTENSION: ICD-10-CM

## 2023-10-26 DIAGNOSIS — E66.01 MORBID OBESITY (H): ICD-10-CM

## 2023-10-26 PROCEDURE — 36415 COLL VENOUS BLD VENIPUNCTURE: CPT | Performed by: PHYSICIAN ASSISTANT

## 2023-10-26 PROCEDURE — 87389 HIV-1 AG W/HIV-1&-2 AB AG IA: CPT | Performed by: PHYSICIAN ASSISTANT

## 2023-10-26 PROCEDURE — 99214 OFFICE O/P EST MOD 30 MIN: CPT | Performed by: PHYSICIAN ASSISTANT

## 2023-10-26 PROCEDURE — 87340 HEPATITIS B SURFACE AG IA: CPT | Performed by: PHYSICIAN ASSISTANT

## 2023-10-26 PROCEDURE — 87591 N.GONORRHOEAE DNA AMP PROB: CPT | Performed by: PHYSICIAN ASSISTANT

## 2023-10-26 PROCEDURE — 87491 CHLMYD TRACH DNA AMP PROBE: CPT | Performed by: PHYSICIAN ASSISTANT

## 2023-10-26 PROCEDURE — 86780 TREPONEMA PALLIDUM: CPT | Performed by: PHYSICIAN ASSISTANT

## 2023-10-26 RX ORDER — HYDROCHLOROTHIAZIDE 25 MG/1
25 TABLET ORAL DAILY
Qty: 90 TABLET | Refills: 0 | Status: CANCELLED | OUTPATIENT
Start: 2023-10-26

## 2023-10-26 RX ORDER — HYDROCHLOROTHIAZIDE 25 MG/1
25 TABLET ORAL DAILY
Qty: 90 TABLET | Refills: 0 | Status: SHIPPED | OUTPATIENT
Start: 2023-10-26 | End: 2024-03-01

## 2023-10-26 RX ORDER — PROPRANOLOL HYDROCHLORIDE 10 MG/1
10 TABLET ORAL 3 TIMES DAILY PRN
Qty: 270 TABLET | Refills: 0 | Status: SHIPPED | OUTPATIENT
Start: 2023-10-26 | End: 2024-02-07

## 2023-10-26 RX ORDER — METFORMIN HCL 500 MG
TABLET, EXTENDED RELEASE 24 HR ORAL
Qty: 180 TABLET | Refills: 0 | Status: SHIPPED | OUTPATIENT
Start: 2023-10-26 | End: 2024-03-01

## 2023-10-26 ASSESSMENT — ENCOUNTER SYMPTOMS
HEMATURIA: 0
DYSURIA: 0
ARTHRALGIAS: 0
HEARTBURN: 0
NERVOUS/ANXIOUS: 0
MYALGIAS: 0
DIARRHEA: 0
PARESTHESIAS: 0
CHILLS: 0
FREQUENCY: 0
BREAST MASS: 0
DIZZINESS: 0
EYE PAIN: 0
WEAKNESS: 0
PALPITATIONS: 0
HEADACHES: 0
ABDOMINAL PAIN: 0
CONSTIPATION: 0
JOINT SWELLING: 0
COUGH: 0
FEVER: 0
SHORTNESS OF BREATH: 0
SORE THROAT: 0
NAUSEA: 0
HEMATOCHEZIA: 0

## 2023-10-26 ASSESSMENT — PATIENT HEALTH QUESTIONNAIRE - PHQ9
SUM OF ALL RESPONSES TO PHQ QUESTIONS 1-9: 0
SUM OF ALL RESPONSES TO PHQ QUESTIONS 1-9: 0
10. IF YOU CHECKED OFF ANY PROBLEMS, HOW DIFFICULT HAVE THESE PROBLEMS MADE IT FOR YOU TO DO YOUR WORK, TAKE CARE OF THINGS AT HOME, OR GET ALONG WITH OTHER PEOPLE: NOT DIFFICULT AT ALL

## 2023-10-26 ASSESSMENT — PAIN SCALES - GENERAL: PAINLEVEL: NO PAIN (0)

## 2023-10-26 NOTE — PROGRESS NOTES
"  {PROVIDER CHARTING PREFERENCE:615775}    Los Calixto is a 38 year old, presenting for the following health issues:  STD (/)      HPI   Concern - Tests   Description: STD testing.         {additonal problems for provider to add (Optional):098265}      Review of Systems   {ROS COMP (Optional):843667}      Objective    BP (!) 153/95   Pulse 80   Temp 98.4  F (36.9  C) (Oral)   Resp 18   Ht 1.67 m (5' 5.75\")   Wt 122.9 kg (271 lb)   LMP 09/15/2023 (Approximate)   SpO2 95%   BMI 44.07 kg/m    Body mass index is 44.07 kg/m .  Physical Exam   {Exam List (Optional):159903}    {Diagnostic Test Results (Optional):594950}    {AMBULATORY ATTESTATION (Optional):023232}              "

## 2023-10-26 NOTE — PROGRESS NOTES
"  Assessment & Plan     Screen for STD (sexually transmitted disease)  Patient asymptomatic with no confirmed exposure.   - HIV Antigen Antibody Combo Cascade; Future  - Chlamydia trachomatis/Neisseria gonorrhoeae by PCR  - Treponema Abs w Reflex to RPR and Titer; Future  - Hepatitis B surface antigen; Future    Benign essential hypertension  Chronic, not well controlled on current medication. However, pt has not been taking medication daily. She hasn't taken it in about 1 week. I stressed the importance of taking medication as prescribed. Advised start taking daily and after 1 week start checking Bps at home for another week. Then MyChart message me with results. Based on those reading will determine if changes are needed to medication. Patient agree's with this plan and has no further questions  - hydrochlorothiazide (HYDRODIURIL) 25 MG tablet; Take 1 tablet (25 mg) by mouth daily    Morbid obesity (H)  - metFORMIN (GLUCOPHAGE XR) 500 MG 24 hr tablet; Take 1 tab by mouth at dinner x 2 weeks.  Then can increase to 2 tabs.    PCOS (polycystic ovarian syndrome)  - metFORMIN (GLUCOPHAGE XR) 500 MG 24 hr tablet; Take 1 tab by mouth at dinner x 2 weeks.  Then can increase to 2 tabs.    Panic attack  - propranolol (INDERAL) 10 MG tablet; Take 1 tablet (10 mg) by mouth 3 times daily as needed (anxiety panic attack)         BMI:   Estimated body mass index is 44.07 kg/m  as calculated from the following:    Height as of this encounter: 1.67 m (5' 5.75\").    Weight as of this encounter: 122.9 kg (271 lb).       FUTURE APPOINTMENTS:       - Follow-up visit in 3/24 for annual physical     JERRY Spears  [facility]    Los Calixto is a 38 year old, presenting for the following health issues:  STD (/)      HPI   Concern - Tests   Description: STD testing. --- She has had 2 new partners in the last 6 months. She has had no confirmed exposure to any STD's. She has never been treated for an " "STD in the past. She is asymptomatic today. She just want to be do the right thing and be screened. Patient's last menstrual period was 09/15/2023 (approximate).  Blood pressure - history of elevated blood pressure. She has not been taking medications prescribed. She can go days but sometimes a week with out taking medication. She denies any  symptoms today such as SOB< chest pain, vision changes, or headache.          Review of Systems   Constitutional, HEENT, cardiovascular, pulmonary, gi and gu systems are negative, except as otherwise noted.      Objective    BP (!) 153/95   Pulse 80   Temp 98.4  F (36.9  C) (Oral)   Resp 18   Ht 1.67 m (5' 5.75\")   Wt 122.9 kg (271 lb)   LMP 09/15/2023 (Approximate)   SpO2 95%   BMI 44.07 kg/m    Body mass index is 44.07 kg/m .  Physical Exam   GENERAL: healthy, alert and no distress  NECK: no adenopathy, no asymmetry, masses, or scars and thyroid normal to palpation  MS: no gross musculoskeletal defects noted, no edema                  "

## 2023-10-27 LAB
C TRACH DNA SPEC QL PROBE+SIG AMP: NEGATIVE
HBV SURFACE AG SERPL QL IA: NONREACTIVE
HIV 1+2 AB+HIV1 P24 AG SERPL QL IA: NONREACTIVE
N GONORRHOEA DNA SPEC QL NAA+PROBE: NEGATIVE
T PALLIDUM AB SER QL: NONREACTIVE

## 2024-01-24 ENCOUNTER — VIRTUAL VISIT (OUTPATIENT)
Dept: FAMILY MEDICINE | Facility: CLINIC | Age: 39
End: 2024-01-24
Payer: COMMERCIAL

## 2024-01-24 DIAGNOSIS — L81.9 HYPERPIGMENTATION OF SKIN: Primary | ICD-10-CM

## 2024-01-24 PROCEDURE — 99213 OFFICE O/P EST LOW 20 MIN: CPT | Mod: 95 | Performed by: FAMILY MEDICINE

## 2024-01-24 NOTE — PROGRESS NOTES
"Durga is a 38 year old who is being evaluated via a billable video visit.      How would you like to obtain your AVS? MyChart  If the video visit is dropped, the invitation should be resent by: Text to cell phone: [phone]  Will anyone else be joining your video visit? No          Assessment & Plan     Hyperpigmentation of skin  -Possibly Melasma on forehead and under eyes  -Continue with sunscreen daily   -Recommended seeing derm for eval and treatment options  - Adult Dermatology  Referral              BMI  Estimated body mass index is 44.07 kg/m  as calculated from the following:    Height as of 10/26/23: 1.67 m (5' 5.75\").    Weight as of 10/26/23: 122.9 kg (271 lb).           Subjective   Durga is a 38 year old, presenting for the following health issues:  Derm Problem (Patient would like to discuss skin changes and darkening of the skin since summer.)      1/24/2024     2:59 PM   Additional Questions   Roomed by Doreen     History of Present Illness       Reason for visit:  Skin issue  Symptom onset:  More than a month  Symptoms include:  Discoloration on forehead  Symptom intensity:  Moderate  Symptom progression:  Worsening  Had these symptoms before:  No    She eats 2-3 servings of fruits and vegetables daily.She consumes 0 sweetened beverage(s) daily.She exercises with enough effort to increase her heart rate 20 to 29 minutes per day.  She exercises with enough effort to increase her heart rate 3 or less days per week. She is missing 2 dose(s) of medications per week.  She is not taking prescribed medications regularly due to remembering to take.     Comes and goes, started the last few month.  Developing large dark spot in the middle of her forehead. Brown color.  No itching, erythema, or pain.  No steroid use.   Wears sunscreen 50 SPF.  Worried about Melasma.               Objective           Vitals:  No vitals were obtained today due to virtual visit.    Physical Exam   GENERAL: alert " and no distress  EYES: Eyes grossly normal to inspection.  No discharge or erythema, or obvious scleral/conjunctival abnormalities.  RESP: No audible wheeze, cough, or visible cyanosis.    SKIN: Visible skin clear. Difficult to see hyperpigmentation on video call.  NEURO: Cranial nerves grossly intact.  Mentation and speech appropriate for age.  PSYCH: Appropriate affect, tone, and pace of words          Video-Visit Details    Type of service:  Video Visit     Originating Location (pt. Location): Home    Distant Location (provider location):  On-site  Platform used for Video Visit: Марина  Signed Electronically by: Hernandez Torres DO

## 2024-01-26 NOTE — PROGRESS NOTES
"Durga is a 38 year old who is being evaluated via a billable video visit.      The patient has been notified of following:     \"This video visit will be conducted via a call between you and your physician/provider. We have found that certain health care needs can be provided without the need for an in-person physical exam.  This service lets us provide the care you need with a video conversation.  If a prescription is necessary we can send it directly to your pharmacy.  If lab work is needed we can place an order for that and you can then stop by our lab to have the test done at a later time.    Video visits are billed at different rates depending on your insurance coverage.  Please reach out to your insurance provider with any questions.    If during the course of the call the physician/provider feels a video visit is not appropriate, you will not be charged for this service.\"    Patient has given verbal consent for Video visit? Yes    How would you like to obtain your AVS? MyChart    If the video visit is dropped, the invitation should be resent by: Text to cell phone: [phone]    Will anyone else be joining your video visit? No    I    Video-Visit Details    Type of service:  Video Visit    Originating Location (pt. Location): Home    Distant Location (provider location):   Off-Site - Provider Home Office    Platform used for Video Visit: [employer]    Video Start Time:  2:34    Video End Time: 3:00        Return Medical Weight Management Note         Durga Flannery  MRN:  [id number]  :  1985  DANI:  2024        Dear ORAL Carpenter CNP,    I had the pleasure of seeing your patient Durga Flannery. She is a 38 year old female who I am continuing to see for treatment of obesity related to:        2022    10:15 PM   --   I have the following health issues associated with obesity High Blood Pressure   I have the following symptoms associated with obesity Fatigue       Assessment   Problem List " "Items Addressed This Visit       Morbid obesity (H) - Primary    PCOS (polycystic ovarian syndrome)          PLAN/DISCUSSION:  We discussed the role of pharmacological agents in the treatment of obesity and the \"off-label\" use of medications in this practice. We discussed the risks and benefits of each. We discussed indications, contraindications, potential side effects, and estimated costs of each. Discussed that medications must always be used together with lifestyle changes such as improvements in diet choices, portion control and establishing and maintaining a regular exercise program.     Discussed zepbound and topiramate. Patient wanted to review options and will get back by the end of next week as to which to start.  If chooses topiramate will need pregnancy test.     Also will do a trial off of the metformin to see how this impacts appetite.       Plan:  Get lab drawn   2.   Read over medication information     FOLLOW-UP:  schedule bariatric annual in 3 months with provider and diet      SUBJECTIVE/OBJECTIVE:  SP gastric sleeve patient last seen 10/13/23 and was started on metformin. Did a lot of travel seeing over the holidays seeing family. Very busy and a lot of food. Not sure if metformin has done a lot for appetite. Weight is up a bit.    Anti-obesity medications:   Current: metformin 1000 mg daily   Side effects: No upset stomach or diarrhea    Antiobesity medication history:  Phentermine (Lomaira, Adipex) Not a candidate due to HTN   Phentermine/Topiramate (Qsymia)    Bupropion/Naltrexone (Contrave)    Liraglutide (Saxenda)    Semaglutide (Wegovy) Not effective 10/23   Tirzepatide (Zepbound)    Orlistat (Xenical/Anmol)    Off-Label Medications for Obesity  Semaglutide (Ozempic)    Tirzepatide (Mounjaro)    Bupropion (Wellbutrin) started but not long enough to know effect    Metformin(Glucophage)    Topiramate (Topamax) Concern about birth control   Naltexone              1/26/2024     4:55 PM   Weight " "Loss Medication History Reviewed With Patient   Which weight loss medications are you currently taking on a regular basis? Metformin   Are you having any side effects from the weight loss medication that we have prescribed you? No       Recent diet changes:   B:  coffee only. Usually has more   L: beef sandwich with lettuce  D: slice of frozen pepperoni pizza   Snacks: not lot  Fluids: 64 oz water daily, coffee and sparkling water    Recent exercise/activity changes: Back at the gym and tries to do more cardio. Jogs a mile. Also will be looking into swimming. Does a lot of strength training 3-4 times weekly      CURRENT WEIGHT:   264 lbs 0 oz    Initial Weight (lbs): 258 lbs  Last Visits Weight: 261 lb (118.4 kg)  Cumulative weight loss (lbs): -6  Weight Loss Percentage: -2.33%        1/26/2024     4:55 PM   Changes and Difficulties   I have made the following changes to my diet since my last visit: Eatting breakfast   With regards to my diet, I am still struggling with: Calorie intake during holidays   With regards to my activity/exercise, I am still struggling with: Getting enough cardio         MEDICATIONS:   Current Outpatient Medications   Medication Sig Dispense Refill    hydrochlorothiazide (HYDRODIURIL) 25 MG tablet Take 1 tablet (25 mg) by mouth daily 90 tablet 0    metFORMIN (GLUCOPHAGE XR) 500 MG 24 hr tablet Take 1 tab by mouth at dinner x 2 weeks.  Then can increase to 2 tabs. 180 tablet 0    Multiple Vitamins-Minerals (HAIR SKIN AND NAILS FORMULA) TABS       propranolol (INDERAL) 10 MG tablet Take 1 tablet (10 mg) by mouth 3 times daily as needed (anxiety panic attack) 270 tablet 0         ROS:  General  Fatigue: No  Sleep Quality: OK      PHYSICAL EXAM:  Objective    Ht 5' 5.75\" (1.67 m)   Wt 264 lb (119.7 kg)   BMI 42.94 kg/m    GENERAL: Healthy, alert and no distress  EYES: Eyes grossly normal to inspection.  No discharge or erythema, or obvious scleral/conjunctival abnormalities.  RESP: No " audible wheeze, cough, or visible cyanosis.  No visible retractions or increased work of breathing.    SKIN: Visible skin clear. No significant rash, abnormal pigmentation or lesions.  NEURO: Cranial nerves grossly intact.  Mentation and speech appropriate for age.  PSYCH: Mentation appears normal, affect normal/bright, judgement and insight intact, normal speech and appearance well-groomed.        Sincerely,    Marcin Aaron PA-C    25 minutes spent on the date of the encounter doing chart review, review of test results, patient visit and documentation

## 2024-02-02 ENCOUNTER — VIRTUAL VISIT (OUTPATIENT)
Dept: SURGERY | Facility: CLINIC | Age: 39
End: 2024-02-02
Payer: COMMERCIAL

## 2024-02-02 VITALS — BODY MASS INDEX: 42.43 KG/M2 | WEIGHT: 264 LBS | HEIGHT: 66 IN

## 2024-02-02 DIAGNOSIS — E28.2 PCOS (POLYCYSTIC OVARIAN SYNDROME): ICD-10-CM

## 2024-02-02 DIAGNOSIS — E66.01 MORBID OBESITY (H): Primary | ICD-10-CM

## 2024-02-02 DIAGNOSIS — F41.0 PANIC ATTACK: ICD-10-CM

## 2024-02-02 PROCEDURE — 99213 OFFICE O/P EST LOW 20 MIN: CPT | Mod: 95 | Performed by: PHYSICIAN ASSISTANT

## 2024-02-02 NOTE — PATIENT INSTRUCTIONS
"Nice to talk with you today! Thank you for allowing me the privilege of caring for you.   We hope we provided you with the excellent service you deserve.     To ensure the quality of our services you may receive a patient satisfaction survey from an independent monitoring company.  The greatest compliment you can give is \"Likely to Recommend\"      Below is our plan we discussed.-  CECI Burch      Plan:  Get lab drawn   2.  Read over medication information     Please call [phone] and schedule a follow up with Marcin Aaron PA-C in 3 months.  If you need to reach me sooner you can do so by calling [phone].    Have a great day!         Zepbound (Tirzepatide)    Zepbound (Tirzepatide): is an injectable prescription medicine recently FDA approved for adults with obesity or overweight with an additional condition affected by their weight.      It is given as a shot once a week. It activates the body's receptors for GIP (glucose-dependent insulinotropic polypeptide) and GLP-1 (glucagon-like peptide-1) receptor, two naturally occurring hormones that help tell the brain that you are full. It also works is by slowing down how quickly food leaves your stomach.     You will start to feel carreno more quickly, notice portion changes and eat less often between meals.  Patients are advised to eat slowly and purposefully. Give yourself less portions. You may find after starting this medication you have a new point of fullness. Maintain consistent eating schedule with meals +/- snacks and get in good hydration.    Dosing:  Initial dose: 2.5 mg injected subcutaneously once weekly for 4 weeks  Further dose changes can include: increase to 5 mg once weekly for 4 weeks, then 7.5 mg once weekly for 4 weeks, then 10 mg once weekly for 4 weeks then 12.5 mg once weekly for 4 weeks, then 15 mg (maximum dose) once weekly.    Dose changes are based on how you are tolerating the current dose, what benefits you are seeing at the " current dose and if improvement in effectiveness of the drug is needed. The goal is to find the dose that works best for you with the least amount of side effects. You may find you reach this at a lower dose than the maximum dose.     Side effects: Common side effects include nausea, Heartburn,diarrhea, constipation. This is worse when starting the medication and with dose dose escalation.  It does improve with time. Stay adequately hydrated and eat small portions to decrease the risk of side effects.     The risk of pancreatitis (inflammation of the pancreas) has been associated with this type of medication, but is very rare.  If you have had pancreatitis in the past, this medication may not be for you. If you experience persistent severe abdominal pain (sometimes radiating to the back potentially accompanied by vomiting and have a fever), stop the medication and contact your provider immediately for assessment. They will do a blood test to check for pancreatitis.       Caution:   Tirzepatide (Zepbound, Mounjaro) May decrease effectiveness of your oral birth control while starting and with dose adjustments.  Use backup forms of birth control if necessary.      For any questions or concerns please send a [employer] message to our team or call our weight management call center at [phone] during regular business hours.         MEDICATION STARTED AT THIS APPOINTMENT  We are starting topiramate at bedtime.  Start one tab, 25 mg, for a week. Go up to 50 mg (2 tabs) for the next week. At the third week, take   3 tabs (75 mg).  Stay at 3 tabs until you are seen again. Call the nurse at [phone] if you have any questions or concerns. (Do not stop taking it if you don't think it's working. For some people it works even though they do not feel much different.)    Topiramate (Topamax) is a medication that is used most often to treat migraine headaches or for seizures. It has also been found to help with weight loss.  Although it's not currently FDA approved for weight loss, it has been used safely for a number of years to help people who are carrying extra weight.     Just how topiramate helps with weight loss has not been exactly determined. However it seems to work on areas of the brain to quiet down signals related to eating.      Topiramate may make you:    >feel less interest in eating in between meals   >think less about food and eating   >find it easier to push the plate away   >find giving up pop easier    >have an easier time eating less    For some of our patients, the pills work right away. They feel and think quite differently about food. Other patients don't feel much of a change but find in fact they have lost weight! Like all weight loss medications, topiramate works best when you help it work.  This means:    1) Have less tempting high calorie (fattening) food around the house or office    2) Have lower calorie food (fruits, vegetables,low fat meats and dairy) for snacks    3) Eat out only one time or less each week.   4) Eat your meals at a table with the TV or computer off.    Side-effects. Topiramate is generally well tolerated. The main side-effects we see are:   Tingling in hands,feet, or face (usually not very troublesome)   Mental confusion and word finding trouble (about 10% of patients have this.)     Feeling sleepy or a bit dopey- this goes away very soon after starting.    One of the dangers of topiramate is the possibility of birth defects--if you get pregnant when you are on it, there is the risk that your baby will be born with a cleft lip or palate.  If you are on topiramate and of child bearing age, you need to be on a reliable form of birth control or refrain from sexual intercourse.     Please refer to the pharmacy insert for more information on side-effects. Since many pharmacists are not familiar with the use of topiramate in weight loss, calling the clinic will get you the most accurate  information on the use of this medication for weight loss.    In order to get refills of this or any medication we prescribe you must be seen in the medical weight mgmt clinic every 2-3 months.     If you have decided not to continue use of topiramate, it is important for you to decrease your dose slowly to avoid risk of seizures.  Please decrease your dose as follows:  50 mg daily for 3 days  25 mg daily for 3 days  Then stop

## 2024-02-05 RX ORDER — PROPRANOLOL HYDROCHLORIDE 10 MG/1
10 TABLET ORAL 3 TIMES DAILY PRN
Qty: 270 TABLET | Refills: 0 | OUTPATIENT
Start: 2024-02-05

## 2024-02-07 DIAGNOSIS — F41.0 PANIC ATTACK: ICD-10-CM

## 2024-02-07 RX ORDER — PROPRANOLOL HYDROCHLORIDE 10 MG/1
10 TABLET ORAL 3 TIMES DAILY PRN
Qty: 90 TABLET | Refills: 0 | Status: SHIPPED | OUTPATIENT
Start: 2024-02-07 | End: 2024-03-01

## 2024-02-21 DIAGNOSIS — F41.0 PANIC ATTACK: ICD-10-CM

## 2024-02-21 DIAGNOSIS — E66.01 MORBID OBESITY (H): ICD-10-CM

## 2024-02-21 DIAGNOSIS — E28.2 PCOS (POLYCYSTIC OVARIAN SYNDROME): ICD-10-CM

## 2024-02-21 DIAGNOSIS — I10 BENIGN ESSENTIAL HYPERTENSION: ICD-10-CM

## 2024-03-01 RX ORDER — PROPRANOLOL HYDROCHLORIDE 10 MG/1
10 TABLET ORAL 3 TIMES DAILY PRN
Qty: 270 TABLET | Refills: 0 | Status: SHIPPED | OUTPATIENT
Start: 2024-03-01 | End: 2024-05-30

## 2024-03-01 RX ORDER — HYDROCHLOROTHIAZIDE 25 MG/1
25 TABLET ORAL DAILY
Qty: 90 TABLET | Refills: 0 | Status: SHIPPED | OUTPATIENT
Start: 2024-03-01 | End: 2024-03-29

## 2024-03-01 RX ORDER — METFORMIN HCL 500 MG
TABLET, EXTENDED RELEASE 24 HR ORAL
Qty: 180 TABLET | Refills: 0 | Status: SHIPPED | OUTPATIENT
Start: 2024-03-01 | End: 2024-03-29

## 2024-03-02 ENCOUNTER — HEALTH MAINTENANCE LETTER (OUTPATIENT)
Age: 39
End: 2024-03-02

## 2024-03-29 ENCOUNTER — OFFICE VISIT (OUTPATIENT)
Dept: FAMILY MEDICINE | Facility: CLINIC | Age: 39
End: 2024-03-29
Payer: COMMERCIAL

## 2024-03-29 VITALS
HEIGHT: 67 IN | HEART RATE: 62 BPM | TEMPERATURE: 98.4 F | RESPIRATION RATE: 16 BRPM | DIASTOLIC BLOOD PRESSURE: 90 MMHG | SYSTOLIC BLOOD PRESSURE: 136 MMHG | WEIGHT: 263.7 LBS | BODY MASS INDEX: 41.39 KG/M2 | OXYGEN SATURATION: 99 %

## 2024-03-29 DIAGNOSIS — Z12.31 ENCOUNTER FOR SCREENING MAMMOGRAM FOR BREAST CANCER: ICD-10-CM

## 2024-03-29 DIAGNOSIS — F32.1 CURRENT MODERATE EPISODE OF MAJOR DEPRESSIVE DISORDER, UNSPECIFIED WHETHER RECURRENT (H): ICD-10-CM

## 2024-03-29 DIAGNOSIS — E28.2 PCOS (POLYCYSTIC OVARIAN SYNDROME): ICD-10-CM

## 2024-03-29 DIAGNOSIS — Z13.6 CARDIOVASCULAR SCREENING; LDL GOAL LESS THAN 160: ICD-10-CM

## 2024-03-29 DIAGNOSIS — E66.01 MORBID OBESITY (H): ICD-10-CM

## 2024-03-29 DIAGNOSIS — Z00.00 ROUTINE GENERAL MEDICAL EXAMINATION AT A HEALTH CARE FACILITY: Primary | ICD-10-CM

## 2024-03-29 DIAGNOSIS — I10 BENIGN ESSENTIAL HYPERTENSION: ICD-10-CM

## 2024-03-29 DIAGNOSIS — Z13.29 SCREENING FOR THYROID DISORDER: ICD-10-CM

## 2024-03-29 DIAGNOSIS — Z11.3 SCREEN FOR STD (SEXUALLY TRANSMITTED DISEASE): ICD-10-CM

## 2024-03-29 LAB
BASOPHILS # BLD AUTO: 0 10E3/UL (ref 0–0.2)
BASOPHILS NFR BLD AUTO: 1 %
EOSINOPHIL # BLD AUTO: 0.1 10E3/UL (ref 0–0.7)
EOSINOPHIL NFR BLD AUTO: 2 %
ERYTHROCYTE [DISTWIDTH] IN BLOOD BY AUTOMATED COUNT: 12.3 % (ref 10–15)
HBA1C MFR BLD: 5.2 % (ref 0–5.6)
HCT VFR BLD AUTO: 43.5 % (ref 35–47)
HGB BLD-MCNC: 14.1 G/DL (ref 11.7–15.7)
IMM GRANULOCYTES # BLD: 0 10E3/UL
IMM GRANULOCYTES NFR BLD: 0 %
LYMPHOCYTES # BLD AUTO: 2.2 10E3/UL (ref 0.8–5.3)
LYMPHOCYTES NFR BLD AUTO: 28 %
MCH RBC QN AUTO: 30.7 PG (ref 26.5–33)
MCHC RBC AUTO-ENTMCNC: 32.4 G/DL (ref 31.5–36.5)
MCV RBC AUTO: 95 FL (ref 78–100)
MONOCYTES # BLD AUTO: 0.4 10E3/UL (ref 0–1.3)
MONOCYTES NFR BLD AUTO: 6 %
NEUTROPHILS # BLD AUTO: 4.9 10E3/UL (ref 1.6–8.3)
NEUTROPHILS NFR BLD AUTO: 64 %
PLATELET # BLD AUTO: 326 10E3/UL (ref 150–450)
RBC # BLD AUTO: 4.59 10E6/UL (ref 3.8–5.2)
WBC # BLD AUTO: 7.7 10E3/UL (ref 4–11)

## 2024-03-29 PROCEDURE — 82570 ASSAY OF URINE CREATININE: CPT | Performed by: PHYSICIAN ASSISTANT

## 2024-03-29 PROCEDURE — 86780 TREPONEMA PALLIDUM: CPT | Performed by: PHYSICIAN ASSISTANT

## 2024-03-29 PROCEDURE — 84443 ASSAY THYROID STIM HORMONE: CPT | Performed by: PHYSICIAN ASSISTANT

## 2024-03-29 PROCEDURE — 36415 COLL VENOUS BLD VENIPUNCTURE: CPT | Performed by: PHYSICIAN ASSISTANT

## 2024-03-29 PROCEDURE — 82043 UR ALBUMIN QUANTITATIVE: CPT | Performed by: PHYSICIAN ASSISTANT

## 2024-03-29 PROCEDURE — 85025 COMPLETE CBC W/AUTO DIFF WBC: CPT | Performed by: PHYSICIAN ASSISTANT

## 2024-03-29 PROCEDURE — 87389 HIV-1 AG W/HIV-1&-2 AB AG IA: CPT | Performed by: PHYSICIAN ASSISTANT

## 2024-03-29 PROCEDURE — 99396 PREV VISIT EST AGE 40-64: CPT | Performed by: PHYSICIAN ASSISTANT

## 2024-03-29 PROCEDURE — 87591 N.GONORRHOEAE DNA AMP PROB: CPT | Performed by: PHYSICIAN ASSISTANT

## 2024-03-29 PROCEDURE — 80053 COMPREHEN METABOLIC PANEL: CPT | Performed by: PHYSICIAN ASSISTANT

## 2024-03-29 PROCEDURE — 87491 CHLMYD TRACH DNA AMP PROBE: CPT | Performed by: PHYSICIAN ASSISTANT

## 2024-03-29 PROCEDURE — 80061 LIPID PANEL: CPT | Performed by: PHYSICIAN ASSISTANT

## 2024-03-29 PROCEDURE — 86803 HEPATITIS C AB TEST: CPT | Performed by: PHYSICIAN ASSISTANT

## 2024-03-29 PROCEDURE — 87340 HEPATITIS B SURFACE AG IA: CPT | Performed by: PHYSICIAN ASSISTANT

## 2024-03-29 PROCEDURE — 83036 HEMOGLOBIN GLYCOSYLATED A1C: CPT | Performed by: PHYSICIAN ASSISTANT

## 2024-03-29 RX ORDER — HYDROCHLOROTHIAZIDE 25 MG/1
25 TABLET ORAL DAILY
Qty: 90 TABLET | Refills: 1 | Status: CANCELLED | OUTPATIENT
Start: 2024-03-29

## 2024-03-29 RX ORDER — LISINOPRIL AND HYDROCHLOROTHIAZIDE 20; 25 MG/1; MG/1
1 TABLET ORAL DAILY
Qty: 90 TABLET | Refills: 1 | Status: SHIPPED | OUTPATIENT
Start: 2024-03-29

## 2024-03-29 ASSESSMENT — PAIN SCALES - GENERAL: PAINLEVEL: NO PAIN (0)

## 2024-03-29 NOTE — PROGRESS NOTES
Preventive Care Visit  [facility]  Mar 29, 2024      Assessment & Plan       ICD-10-CM    1. Routine general medical examination at a health care facility  Z00.00       2. Benign essential hypertension  I10 Comprehensive metabolic panel     Albumin Random Urine Quantitative with Creat Ratio     lisinopril-hydrochlorothiazide (ZESTORETIC) 20-25 MG tablet     Albumin Random Urine Quantitative with Creat Ratio     Comprehensive metabolic panel      3. Morbid obesity (H)  E66.01       4. PCOS (polycystic ovarian syndrome)  E28.2 Comprehensive metabolic panel     CBC with Platelets & Differential     Hemoglobin A1c     Albumin Random Urine Quantitative with Creat Ratio     Albumin Random Urine Quantitative with Creat Ratio     Hemoglobin A1c     CBC with Platelets & Differential     Comprehensive metabolic panel      5. Current moderate episode of major depressive disorder, unspecified whether recurrent (H)  F32.1       6. Screening for thyroid disorder  Z13.29 TSH with free T4 reflex     TSH with free T4 reflex      7. Encounter for screening mammogram for breast cancer  Z12.31 *MA Screening Digital Bilateral      8. Screen for STD (sexually transmitted disease)  Z11.3 Chlamydia trachomatis/Neisseria gonorrhoeae by PCR     Treponema Abs w Reflex to RPR and Titer     HIV Antigen Antibody Combo Cascade     Hepatitis C Screen Reflex to HCV RNA Quant and Genotype     Hepatitis B surface antigen     Hepatitis B surface antigen     Hepatitis C Screen Reflex to HCV RNA Quant and Genotype     HIV Antigen Antibody Combo Cascade     Treponema Abs w Reflex to RPR and Titer     Chlamydia trachomatis/Neisseria gonorrhoeae by PCR      9. CARDIOVASCULAR SCREENING; LDL GOAL LESS THAN 160  Z13.6 Lipid panel reflex to direct LDL Fasting     Lipid panel reflex to direct LDL Fasting           Patient has been advised of split billing requirements and indicates understanding:  "Yes  Review of prior external note(s) from - previous routine and speciality notes  20 minutes spent by me on the date of the encounter doing chart review, history and exam, documentation and further activities per the note      BMI  Estimated body mass index is 41.88 kg/m  as calculated from the following:    Height as of this encounter: 1.69 m (5' 6.54\").    Weight as of this encounter: 119.6 kg (263 lb 11.2 oz).   Weight management plan: Discussed healthy diet and exercise guidelines    Counseling  Appropriate preventive services were discussed with this patient, including applicable screening as appropriate for fall prevention, nutrition, physical activity, Tobacco-use cessation, weight loss and cognition.  Checklist reviewing preventive services available has been given to the patient.  Reviewed patient's diet, addressing concerns and/or questions.       See Patient Instructions    Los Calixto is a 39 year old, presenting for the following:  Physical (Pt is not fasting)        3/29/2024    10:41 AM   Additional Questions   Roomed by Sarai HERRON   Accompanied by n/a        Health Care Directive  Patient does not have a Health Care Directive or Living Will: Discussed advance care planning with patient; however, patient declined at this time.    HPI        3/29/2024   General Health   How would you rate your overall physical health? (!) FAIR   Feel stress (tense, anxious, or unable to sleep) To some extent   (!) STRESS CONCERN      3/29/2024   Nutrition   Three or more servings of calcium each day? Yes   Diet: I don't know   How many servings of fruit and vegetables per day? (!) 2-3   How many sweetened beverages each day? 0-1         10/26/2023   Exercise   Frequency of exercise: 2-3 days/week         3/29/2024   Social Factors   Worry food won't last until get money to buy more No   Food not last or not have enough money for food? No   Do you have housing?  Yes   Are you worried about losing your housing? " No   Lack of transportation? No   Unable to get utilities (heat,electricity)? No         3/29/2024   Dental   Dentist two times every year? Yes         3/29/2024   TB Screening   Were you born outside of the [place]? Yes         Today's PHQ-9 Score:       10/26/2023    11:46 AM   PHQ-9 SCORE   PHQ-9 Total Score MyChart 0   PHQ-9 Total Score 0           3/29/2024   Substance Use   Alcohol more than 3/day or more than 7/wk No   Do you use any other substances recreationally? No     Social History     Tobacco Use    Smoking status: Never     Passive exposure: Never    Smokeless tobacco: Never   Vaping Use    Vaping Use: Never used   Substance Use Topics    Alcohol use: Yes     Comment: 3/ week    Drug use: Never             3/29/2024   Breast Cancer Screening   Family history of breast, colon, or ovarian cancer? Yes         3/29/2024   LAST FHS-7 RESULTS   1st degree relative breast or ovarian cancer Unknown   Any relative bilateral breast cancer Unknown   Any male have breast cancer Unknown   Any ONE woman have BOTH breast AND ovarian cancer Unknown   Any woman with breast cancer before 50yrs Unknown   2 or more relatives with breast AND/OR ovarian cancer Unknown   2 or more relatives with breast AND/OR bowel cancer Unknown        Mammogram Screening - Mammogram every 1-2 years updated in Health Maintenance based on mutual decision making        3/29/2024   STI Screening   New sexual partner(s) since last STI/HIV test? (!) YES       History of abnormal Pap smear: NO - age 30-65 PAP every 5 years with negative HPV co-testing recommended        Latest Ref Rng & Units 3/16/2021     5:21 PM 3/16/2021     5:02 PM   PAP / HPV   PAP (Historical)   NIL    HPV 16 DNA NEG^Negative Negative     HPV 18 DNA NEG^Negative Negative     Other HR HPV NEG^Negative Negative       ASCVD Risk   The 10-year ASCVD risk score (Maria De Jesus URBINA, et al., 2019) is: 1.4%    Values used to calculate the score:      Age: 39 years      Sex: Female       Is Non- : No      Diabetic: No      Tobacco smoker: No      Systolic Blood Pressure: 136 mmHg      Is BP treated: Yes      HDL Cholesterol: 32 mg/dL      Total Cholesterol: 150 mg/dL        3/29/2024   Contraception/Family Planning   Questions about contraception or family planning No        Reviewed and updated as needed this visit by Provider   Tobacco  Allergies  Meds  Problems  Med Hx  Surg Hx  Fam Hx            Past Medical History:   Diagnosis Date    Current moderate episode of major depressive disorder, unspecified whether recurrent (H) 3/20/2023    Hypertension      Past Surgical History:   Procedure Laterality Date    LAPAROSCOPIC GASTRIC SLEEVE  2015    [facility]    WISDOM TOOTH EXTRACTION       Labs reviewed in EPIC  BP Readings from Last 3 Encounters:   24 (!) 136/90   10/26/23 (!) 153/95   23 136/88    Wt Readings from Last 3 Encounters:   24 119.6 kg (263 lb 11.2 oz)   24 119.7 kg (264 lb)   10/26/23 122.9 kg (271 lb)                  Patient Active Problem List   Diagnosis    Panic attack    Generalized anxiety disorder    Morbid obesity (H)    History of thyroid nodule    History of sleeve gastrectomy    Current moderate episode of major depressive disorder, unspecified whether recurrent (H)    PCOS (polycystic ovarian syndrome)     Past Surgical History:   Procedure Laterality Date    LAPAROSCOPIC GASTRIC SLEEVE  2015    [facility]    WISDOM TOOTH EXTRACTION         Social History     Tobacco Use    Smoking status: Never     Passive exposure: Never    Smokeless tobacco: Never   Substance Use Topics    Alcohol use: Yes     Comment: 3/ week     Family History   Problem Relation Age of Onset    Uterine Cancer Mother     Thyroid Disease Mother     Other Cancer Mother     Diabetes Father             Hypertension Father     Asthma Father     Schizophrenia Sister     Depression Sister     Anxiety Disorder Sister     Mental Illness Sister      "Cerebrovascular Disease Maternal Grandmother     Cerebrovascular Disease Maternal Grandfather     Breast Cancer No family hx of     Ovarian Cancer No family hx of     Colorectal Cancer No family hx of          Current Outpatient Medications   Medication Sig Dispense Refill    lisinopril-hydrochlorothiazide (ZESTORETIC) 20-25 MG tablet Take 1 tablet by mouth daily 90 tablet 1    Multiple Vitamins-Minerals (HAIR SKIN AND NAILS FORMULA) TABS       propranolol (INDERAL) 10 MG tablet Take 1 tablet (10 mg) by mouth 3 times daily as needed (anxiety panic attack) 270 tablet 0     Allergies   Allergen Reactions    Seasonal Allergies      Recent Labs   Lab Test 07/21/23  1428 03/20/23  1622 02/16/22  1457 02/16/22  1457 11/06/20  1019   A1C  --   --   --  5.0  --    LDL  --  110*  --  109* 125*   HDL  --  32*  --  34* 38*   TRIG  --  38  --  38 30   ALT  --  13  --  21 20   CR 0.81 0.80   < > 0.89 0.67   GFRESTIMATED >90 >90   < > 85 >90   GFRESTBLACK  --   --   --   --  >90   POTASSIUM 4.4 4.6  --  4.1 4.1   TSH  --   --   --  1.33 2.11    < > = values in this interval not displayed.          Review of Systems  Constitutional, neuro, ENT, endocrine, pulmonary, cardiac, gastrointestinal, genitourinary, musculoskeletal, integument and psychiatric systems are negative, except as otherwise noted.     Objective    Exam  BP (!) 136/90 (BP Location: Left arm, Patient Position: Sitting, Cuff Size: Adult Regular)   Pulse 62   Temp 98.4  F (36.9  C) (Temporal)   Resp 16   Ht 1.69 m (5' 6.54\")   Wt 119.6 kg (263 lb 11.2 oz)   LMP 03/25/2024 (Exact Date)   SpO2 99%   BMI 41.88 kg/m     Estimated body mass index is 41.88 kg/m  as calculated from the following:    Height as of this encounter: 1.69 m (5' 6.54\").    Weight as of this encounter: 119.6 kg (263 lb 11.2 oz).    Physical Exam  GENERAL: alert and no distress  EYES: Eyes grossly normal to inspection, PERRL and conjunctivae and sclerae normal  HENT: ear canals and TM's " normal, nose and mouth without ulcers or lesions  NECK: no adenopathy, no asymmetry, masses, or scars  RESP: lungs clear to auscultation - no rales, rhonchi or wheezes  CV: regular rate and rhythm, normal S1 S2, no S3 or S4, no murmur, click or rub, no peripheral edema  MS: no gross musculoskeletal defects noted, no edema  SKIN: no suspicious lesions or rashes  NEURO: Normal strength and tone, mentation intact and speech normal  PSYCH: mentation appears normal, affect normal/bright        Signed Electronically by: Luís Garcia PA-C

## 2024-03-29 NOTE — PATIENT INSTRUCTIONS
RESOURCES FOR OLDER ADULTS AND THEIR FAMILIES  [here are some resources that may apply to you now or in the future]      Senior LinkAge Line (227-547-8017) -- Free telephone consultation to learn about services for older adults and family caregivers including housing options, transportation (including [employer]), homemaker, outdoor chore, meals, grocery delivery, friendly visiting, telephone reassurance, home modification, respite and caregiver consultation.  https://[place].gov[place]senior-linkage-line/    For veterans -- LinkEasyPost Support (1-436.290.7581).  Answers on all [place]-related questions including healthcare benefits for Veterans and their spouses. https://[employer].org/    For persons with disabilities -- Disability Hub [place].  Free [facility] resource network that helps people with lifelong or acquired disabilities solve problems, navigate the system and plan for the future.  https://disabilityhubmn.org/     Advance Care Planning / Living [place]  If you have not done so, you are encouraged to complete advance directives and/or a living will.   More information about advance directives can be found through:    The [place] Medical Association.  https://www.mnmed.org/advocacy/Key-Issues/Advance-Directives     Honoring [facility].  https://www.honoringchoices.org/ OR call Open legal decision maker information at [phone]    The Senior LinkAge Line (092-867-4458) can provide assistance with completion of advance care planning documents.  https://[place].gov[place]senior-linkage-line/    Managing your Medication   Review your medications attached at the end of this document.  Note any changes made by your care team in today's encounter.    Let your provider know if you start taking any new prescription, over-the-counter (OTC), supplements, or herbal medications    Unwanted Medications- Find a location to safely take your unwanted medications  https://www.pca.[facility].[place].us/living-green/managing-unwanted-medications    Support for Persons and Caregivers Concerned About Memory or Dementia  Alzheimer's Association -- 24/7 Hotline (1-267.696.9312).  https://www.alz.org/    Alzheimer's Disease Education and Referral Center (1-797.445.8901). https://www.patric.nih.gov/health/rylkq-cdfjm-fhcrpe    Family Caregiver [place] (1-163.159.3590). https://www.caregiver.org    Safety / Emergencies  Falls -- when someone falls, they should call 911 if they have suffered a serious injury. 911 will also provide a lift assist to get the person off the floor.     Fall prevention classes - can be taken through [facility] .  Classes are no or low-cost and some insurance plans cover the fee.  [facility] also offers Live Well and Get Fit classes.  To enroll in a class call (toll free) [phone] or register online at https://Higher One/    Personal Emergency Response Systems (PERS) - Senior LinkAge Line (837-149-2969) can provide options specific to your location.  https://[place].gov[place]senior-linkage-line/    National Suicide Prevention Hotline: 1-446.839.6224 or text [place] to 950248.     Preventive Care Advice   This is general advice given by our system to help you stay healthy. However, your care team may have specific advice just for you. Please talk to your care team about your preventive care needs.  Nutrition  Eat 5 or more servings of fruits and vegetables each day.  Try wheat bread, brown rice and whole grain pasta (instead of white bread, rice, and pasta).  Get enough calcium and vitamin D. Check the label on foods and aim for 100% of the RDA (recommended daily allowance).  Lifestyle  Exercise at least 150 minutes each week   (30 minutes a day, 5 days a week).  Do muscle strengthening activities 2 days a week. These help control your weight and prevent disease.  No smoking.  Wear sunscreen to prevent skin cancer.  Have a dental exam and cleaning every 6 months.  Yearly exams  See  your health care team every year to talk about:  Any changes in your health.  Any medicines your care team has prescribed.  Preventive care, family planning, and ways to prevent chronic diseases.  Shots (vaccines)   HPV shots (up to age 26), if you've never had them before.  Hepatitis B shots (up to age 59), if you've never had them before.  COVID-19 shot: Get this shot when it's due.  Flu shot: Get a flu shot every year.  Tetanus shot: Get a tetanus shot every 10 years.  Pneumococcal, hepatitis A, and RSV shots: Ask your care team if you need these based on your risk.  Shingles shot (for age 50 and up).  General health tests  Diabetes screening:  Starting at age 35, Get screened for diabetes at least every 3 years.  If you are younger than age 35, ask your care team if you should be screened for diabetes.  Cholesterol test: At age 39, start having a cholesterol test every 5 years, or more often if advised.  Bone density scan (DEXA): At age 50, ask your care team if you should have this scan for osteoporosis (brittle bones).  Hepatitis C: Get tested at least once in your life.  STIs (sexually transmitted infections)  Before age 24: Ask your care team if you should be screened for STIs.  After age 24: Get screened for STIs if you're at risk. You are at risk for STIs (including HIV) if:  You are sexually active with more than one person.  You don't use condoms every time.  You or a partner was diagnosed with a sexually transmitted infection.  If you are at risk for HIV, ask about PrEP medicine to prevent HIV.  Get tested for HIV at least once in your life, whether you are at risk for HIV or not.  Cancer screening tests  Cervical cancer screening: If you have a cervix, begin getting regular cervical cancer screening tests at age 21. Most people who have regular screenings with normal results can stop after age 65. Talk about this with your provider.  Breast cancer scan (mammogram): If you've ever had breasts, begin  having regular mammograms starting at age 40. This is a scan to check for breast cancer.  Colon cancer screening: It is important to start screening for colon cancer at age 45.  Have a colonoscopy test every 10 years (or more often if you're at risk) Or, ask your provider about stool tests like a FIT test every year or Cologuard test every 3 years.  To learn more about your testing options, visit: https://www.Baobab/026800.pdf.  For help making a decision, visit: https://bit.ly/cm16842.  Prostate cancer screening test: If you have a prostate and are age 55 to 69, ask your provider if you would benefit from a yearly prostate cancer screening test.  Lung cancer screening: If you are a current or former smoker age 50 to 80, ask your care team if ongoing lung cancer screenings are right for you.  For informational purposes only. Not to replace the advice of your health care provider. Copyright   2023 [employer]. All rights reserved. Clinically reviewed by the [facility] [place] [facility] Transitions Program. igadget.asia [id number] - REV 01/24.    Learning About Stress  What is stress?     Stress is your body's response to a hard situation. Your body can have a physical, emotional, or mental response. Stress is a fact of life for most people, and it affects everyone differently. What causes stress for you may not be stressful for someone else.  A lot of things can cause stress. You may feel stress when you go on a job interview, take a test, or run a race. This kind of short-term stress is normal and even useful. It can help you if you need to work hard or react quickly. For example, stress can help you finish an important job on time.  Long-term stress is caused by ongoing stressful situations or events. Examples of long-term stress include long-term health problems, ongoing problems at work, or conflicts in your family. Long-term stress can harm your health.  How does stress affect your health?  When you are  stressed, your body responds as though you are in danger. It makes hormones that speed up your heart, make you breathe faster, and give you a burst of energy. This is called the fight-or-flight stress response. If the stress is over quickly, your body goes back to normal and no harm is done.  But if stress happens too often or lasts too long, it can have bad effects. Long-term stress can make you more likely to get sick, and it can make symptoms of some diseases worse. If you tense up when you are stressed, you may develop neck, shoulder, or low back pain. Stress is linked to high blood pressure and heart disease.  Stress also harms your emotional health. It can make you aceves, tense, or depressed. Your relationships may suffer, and you may not do well at work or school.  What can you do to manage stress?  You can try these things to help manage stress:   Do something active. Exercise or activity can help reduce stress. Walking is a great way to get started. Even everyday activities such as housecleaning or yard work can help.  Try yoga or daniel chi. These techniques combine exercise and meditation. You may need some training at first to learn them.  Do something you enjoy. For example, listen to music or go to a movie. Practice your hobby or do volunteer work.  Meditate. This can help you relax, because you are not worrying about what happened before or what may happen in the future.  Do guided imagery. Imagine yourself in any setting that helps you feel calm. You can use online videos, books, or a teacher to guide you.  Do breathing exercises. For example:  From a standing position, bend forward from the waist with your knees slightly bent. Let your arms dangle close to the floor.  Breathe in slowly and deeply as you return to a standing position. Roll up slowly and lift your head last.  Hold your breath for just a few seconds in the standing position.  Breathe out slowly and bend forward from the waist.  Let your  "feelings out. Talk, laugh, cry, and express anger when you need to. Talking with supportive friends or family, a counselor, or a lois leader about your feelings is a healthy way to relieve stress. Avoid discussing your feelings with people who make you feel worse.  Write. It may help to write about things that are bothering you. This helps you find out how much stress you feel and what is causing it. When you know this, you can find better ways to cope.  What can you do to prevent stress?  You might try some of these things to help prevent stress:  Manage your time. This helps you find time to do the things you want and need to do.  Get enough sleep. Your body recovers from the stresses of the day while you are sleeping.  Get support. Your family, friends, and community can make a difference in how you experience stress.  Limit your news feed. Avoid or limit time on social media or news that may make you feel stressed.  Do something active. Exercise or activity can help reduce stress. Walking is a great way to get started.  Where can you learn more?  Go to https://www.[employer] Access Software.net/patiented  Enter N032 in the search box to learn more about \"Learning About Stress.\"  Current as of: October 24, 2023               Content Version: 14.0    8921-9807 [employer].   Care instructions adapted under license by your healthcare professional. If you have questions about a medical condition or this instruction, always ask your healthcare professional. [employer] disclaims any warranty or liability for your use of this information.      Safer Sex: Care Instructions  Overview  Safer sex is a way to reduce your risk of getting a sexually transmitted infection (STI). It can also help prevent pregnancy.  Several products can help you practice safer sex and reduce your chance of STIs. One of the best is a condom. There are internal and external condoms. You can use a special rubber sheet (dental dam) for " protection during oral sex. Disposable gloves can keep your hands from touching blood, semen, or other body fluids that can carry infections.  Remember that birth control methods such as diaphragms, IUDs, foams, and birth control pills do not stop you from getting STIs.  Follow-up care is a key part of your treatment and safety. Be sure to make and go to all appointments, and call your doctor if you are having problems. It's also a good idea to know your test results and keep a list of the medicines you take.  How can you care for yourself at home?  Think about getting vaccinated to help prevent hepatitis A, hepatitis B, and human papillomavirus (HPV). They can be spread through sex.  Use a condom every time you have sex. Use an external condom, which goes on the penis. Or use an internal condom, which goes into the vagina or anus.  Make sure you use the right size external condom. A condom that's too small can break easily. A condom that's too big can slip off during sex.  Use a new condom each time you have sex. Be careful not to poke a hole in the condom when you open the wrapper.  Don't use an internal condom and an external condom at the same time.  Never use petroleum jelly (such as Vaseline), grease, hand lotion, baby oil, or anything with oil in it. These products can make holes in the condom.  After intercourse, hold the edge of the condom as you remove it. This will help keep semen from spilling out of the condom.  Do not have sex with anyone who has symptoms of an STI, such as sores on the genitals or mouth.  Do not drink a lot of alcohol or use drugs before sex.  Limit your sex partners. Sex with one partner who has sex only with you can reduce your risk of getting an STI.  Don't share sex toys. But if you do share them, use a condom and clean the sex toys between each use.  Talk to any partners before you have sex. Talk about what you feel comfortable with and whether you have any boundaries with sex.  "And find out if your partner or partners may be at risk for any STI. Keep in mind that a person may be able to spread an STI even if they do not have symptoms. You and any partners may want to get tested for STIs.  Where can you learn more?  Go to https://www.Kaliki.net/patiented  Enter B608 in the search box to learn more about \"Safer Sex: Care Instructions.\"  Current as of: November 27, 2023               Content Version: 14.0    9299-5135 [employer].   Care instructions adapted under license by your healthcare professional. If you have questions about a medical condition or this instruction, always ask your healthcare professional. [employer] disclaims any warranty or liability for your use of this information.      "

## 2024-03-30 LAB
ALBUMIN SERPL BCG-MCNC: 4 G/DL (ref 3.5–5.2)
ALP SERPL-CCNC: 66 U/L (ref 40–150)
ALT SERPL W P-5'-P-CCNC: 12 U/L (ref 0–50)
ANION GAP SERPL CALCULATED.3IONS-SCNC: 9 MMOL/L (ref 7–15)
AST SERPL W P-5'-P-CCNC: 18 U/L (ref 0–45)
BILIRUB SERPL-MCNC: 0.4 MG/DL
BUN SERPL-MCNC: 11.1 MG/DL (ref 6–20)
C TRACH DNA SPEC QL PROBE+SIG AMP: NEGATIVE
CALCIUM SERPL-MCNC: 9.1 MG/DL (ref 8.6–10)
CHLORIDE SERPL-SCNC: 106 MMOL/L (ref 98–107)
CHOLEST SERPL-MCNC: 140 MG/DL
CREAT SERPL-MCNC: 0.81 MG/DL (ref 0.51–0.95)
CREAT UR-MCNC: 191 MG/DL
DEPRECATED HCO3 PLAS-SCNC: 25 MMOL/L (ref 22–29)
EGFRCR SERPLBLD CKD-EPI 2021: >90 ML/MIN/1.73M2
FASTING STATUS PATIENT QL REPORTED: NO
GLUCOSE SERPL-MCNC: 90 MG/DL (ref 70–99)
HBV SURFACE AG SERPL QL IA: NONREACTIVE
HCV AB SERPL QL IA: NONREACTIVE
HDLC SERPL-MCNC: 36 MG/DL
HIV 1+2 AB+HIV1 P24 AG SERPL QL IA: NONREACTIVE
LDLC SERPL CALC-MCNC: 97 MG/DL
MICROALBUMIN UR-MCNC: <12 MG/L
MICROALBUMIN/CREAT UR: NORMAL MG/G{CREAT}
N GONORRHOEA DNA SPEC QL NAA+PROBE: NEGATIVE
NONHDLC SERPL-MCNC: 104 MG/DL
POTASSIUM SERPL-SCNC: 4.2 MMOL/L (ref 3.4–5.3)
PROT SERPL-MCNC: 6.9 G/DL (ref 6.4–8.3)
SODIUM SERPL-SCNC: 140 MMOL/L (ref 135–145)
T PALLIDUM AB SER QL: NONREACTIVE
TRIGL SERPL-MCNC: 33 MG/DL
TSH SERPL DL<=0.005 MIU/L-ACNC: 0.92 UIU/ML (ref 0.3–4.2)

## 2024-04-01 NOTE — RESULT ENCOUNTER NOTE
"Paulina Calixto  Your attached labs are within normal limits and negative for STDs.  Please contact the office with any questions or concerns.    Melisa Zavaleta \"Luís\" CECI Garcia    "

## 2024-05-16 ENCOUNTER — MYC MEDICAL ADVICE (OUTPATIENT)
Dept: FAMILY MEDICINE | Facility: CLINIC | Age: 39
End: 2024-05-16
Payer: COMMERCIAL

## 2024-05-16 NOTE — TELEPHONE ENCOUNTER
MP,   Form printed and left in your box to review.   Just needs your signature.  Thanks!  Gale VILLAFUERTE

## 2024-06-01 ENCOUNTER — MYC MEDICAL ADVICE (OUTPATIENT)
Dept: FAMILY MEDICINE | Facility: CLINIC | Age: 39
End: 2024-06-01
Payer: COMMERCIAL

## 2024-06-03 ENCOUNTER — ANCILLARY PROCEDURE (OUTPATIENT)
Dept: GENERAL RADIOLOGY | Facility: CLINIC | Age: 39
End: 2024-06-03
Attending: FAMILY MEDICINE
Payer: COMMERCIAL

## 2024-06-03 ENCOUNTER — OFFICE VISIT (OUTPATIENT)
Dept: ORTHOPEDICS | Facility: CLINIC | Age: 39
End: 2024-06-03
Payer: COMMERCIAL

## 2024-06-03 ENCOUNTER — PRE VISIT (OUTPATIENT)
Dept: ORTHOPEDICS | Facility: CLINIC | Age: 39
End: 2024-06-03

## 2024-06-03 DIAGNOSIS — M25.562 LEFT KNEE PAIN, UNSPECIFIED CHRONICITY: ICD-10-CM

## 2024-06-03 DIAGNOSIS — M25.562 LEFT KNEE PAIN, UNSPECIFIED CHRONICITY: Primary | ICD-10-CM

## 2024-06-03 DIAGNOSIS — M25.562 PATELLOFEMORAL ARTHRALGIA OF LEFT KNEE: Primary | ICD-10-CM

## 2024-06-03 PROCEDURE — 73562 X-RAY EXAM OF KNEE 3: CPT | Mod: LT | Performed by: RADIOLOGY

## 2024-06-03 PROCEDURE — 99203 OFFICE O/P NEW LOW 30 MIN: CPT | Performed by: FAMILY MEDICINE

## 2024-06-03 NOTE — PROGRESS NOTES
"Sports Medicine Clinic Visit    PCP: Melisa Garcia    Durga Flannery is a 39 year old female who is seen  as self referral presenting with left sided knee popping, \"pressure sensation\", sean with long periods of walking.  No terrence swelling Previous knee injury approx 2-3 years ago, walking down wet steps and slipped twisting her knee. Noted bruising afterwards but never had it evaluated at the time.  Patient denies consistent mechanical problems with her knee since the injury however.    Patient is involved in Olympic style weight lifting with a bar and plates as well as hand-held weights with squats and lunges.  Patient attends a weightlifting gym.      Location of Pain: left knee  Duration of Pain: 4 day(s)  Rating of Pain: 0/10  Pain is better with: Rest  Pain is worse with: NA  Additional Features: popping, pressure  Treatment so far consists of: Rest  Prior History of related problems: Previous knee injury walking down wet steps and slipped twisting her knee. Noted bruising afterwards but never had it evaluated    There were no vitals taken for this visit.        Patient in February 2024 was involved in gym workouts including cardio workouts, jogging a mile.  Strength training 3-4 times weekly.    Past history of sleeve gastrectomy for obesity 2015, [facility].. Down roughly 100 lbs since surgery. Lowest weight since surgery was 225 lbs.  Status post nutrition referral and nutrition counseling 2022.  Medical weight management consult 12/15/2022. 12/15/2022 height of 5' 5.75\"[pt reported[, a weight of 258 lbs 0 oz, and the calculated Body mass index is 41.96 kg/m .  2/2/2024 weight 264 pounds 0 ounces.  Surgery clinic note 2/2/2024 reviewed by me.      PMH:  Past Medical History:   Diagnosis Date    Current moderate episode of major depressive disorder, unspecified whether recurrent (H) 3/20/2023    Hypertension      Past Surgical History:   Procedure Laterality Date    LAPAROSCOPIC GASTRIC " SLEEVE   2015     [facility]    WISDOM TOOTH EXTRACTION         Active problem list:  Patient Active Problem List   Diagnosis    Panic attack    Generalized anxiety disorder    Morbid obesity (H)    History of thyroid nodule    History of sleeve gastrectomy    Current moderate episode of major depressive disorder, unspecified whether recurrent (H)    PCOS (polycystic ovarian syndrome)       FH:  Family History   Problem Relation Age of Onset    Uterine Cancer Mother     Thyroid Disease Mother     Other Cancer Mother     Diabetes Father             Hypertension Father     Asthma Father     Schizophrenia Sister     Depression Sister     Anxiety Disorder Sister     Mental Illness Sister     Cerebrovascular Disease Maternal Grandmother     Cerebrovascular Disease Maternal Grandfather     Breast Cancer No family hx of     Ovarian Cancer No family hx of     Colorectal Cancer No family hx of        SH:  Social History     Socioeconomic History    Marital status: Single     Spouse name: Not on file    Number of children: Not on file    Years of education: Not on file    Highest education level: Not on file   Occupational History    Not on file   Tobacco Use    Smoking status: Never     Passive exposure: Never    Smokeless tobacco: Never   Vaping Use    Vaping status: Never Used   Substance and Sexual Activity    Alcohol use: Yes     Comment: 3/ week    Drug use: Never    Sexual activity: Yes     Partners: Male     Birth control/protection: Condom     Comment: Plan B   Other Topics Concern    Parent/sibling w/ CABG, MI or angioplasty before 65F 55M? Yes     Comment: Father   Social History Narrative    2022: from [place] originally, family originated from [place] in [place] [facility], did college in [place]. went to college in [place], went back to [place] and came to [place] for work at the start of the pandemic.  Mother's family originate from [place].  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 Lives alone . No pets . Some family in the [place] but none in [place]      Social Determinants of Health     Financial Resource Strain: Low Risk  (3/29/2024)    Financial Resource Strain     Within the past 12 months, have you or your family members you live with been unable to get utilities (heat, electricity) when it was really needed?: No   Food Insecurity: Low Risk  (3/29/2024)    Food Insecurity     Within the past 12 months, did you worry that your food would run out before you got money to buy more?: No     Within the past 12 months, did the food you bought just not last and you didn t have money to get more?: No   Transportation Needs: Low Risk  (3/29/2024)    Transportation Needs     Within the past 12 months, has lack of transportation kept you from medical appointments, getting your medicines, non-medical meetings or appointments, work, or from getting things that you need?: No   Physical Activity: Not on file   Stress: Stress Concern Present (3/29/2024)    [employer] [place] [employer] - Occupational Stress Questionnaire     Feeling of Stress : To some extent   Social Connections: Not on file   Interpersonal Safety: Low Risk  (10/26/2023)    Interpersonal Safety     Do you feel physically and emotionally safe where you currently live?: Yes     Within the past 12 months, have you been hit, slapped, kicked or otherwise physically hurt by someone?: No     Within the past 12 months, have you been humiliated or emotionally abused in other ways by your partner or ex-partner?: No   Housing Stability: Low Risk  (3/29/2024)    Housing Stability     Do you have housing? : Yes     Are you worried about losing your housing?: No       MEDS:  See EMR, reviewed  ALL:  See EMR, reviewed    REVIEW OF SYSTEMS:  CONSTITUTIONAL:NEGATIVE for fever, chills, change in weight  INTEGUMENTARY/SKIN: NEGATIVE for worrisome rashes, moles or lesions  EYES: NEGATIVE for vision changes or  "irritation  ENT/MOUTH: NEGATIVE for ear, mouth and throat problems  RESP:NEGATIVE for significant cough or SOB  BREAST: NEGATIVE for masses, tenderness or discharge  CV: NEGATIVE for chest pain, palpitations or peripheral edema  GI: NEGATIVE for nausea, abdominal pain, heartburn, or change in bowel habits  :NEGATIVE for frequency, dysuria, or hematuria  :NEGATIVE for frequency, dysuria, or hematuria  NEURO: NEGATIVE for weakness, dizziness or paresthesias  ENDOCRINE: NEGATIVE for temperature intolerance, skin/hair changes  HEME/ALLERGY/IMMUNE: NEGATIVE for bleeding problems  PSYCHIATRIC: NEGATIVE for changes in mood or affect          Inspection:       No effusion      AP/lateral alignment normal      Non-tender: patellar facets, MCL, LCL, lateral joint line, medial joint line, IT band, pes anserine bursa, fibular head    Symmetrical medial and lateral patellar excursion, with no \"winking\" knee cap or obvious patella daniela    Active Range of Motion: full extension.  flexion allowed beyond 100 degrees.    Strength: quad  5/5, Hamstrings  5/5, Gastroc  5/5, Tibialis anterior  5/5, Peroneals  5/5     Special tests: normal Valgus stress test, normal Varus, negative Lachman's test, negative pivot shift, negative posterior drawer, no posterolateral corner signs, negative Jacqui's, no apprehension with lateral stress of the patella.    Sensation intact.  Distal pulses intact.  Capillary refill normal.  Skin intact, without signs of cellulitis.    Neutral heel with neutral forefoot.      I personally viewed the patient x-rays of the left knee that show no significant degenerative change or bony abnormality.    Assessment: Left-sided knee patellofemoral discomfort    Plan: She would like to see a sports physical therapist, Kika at [facility] for proper squat and lunge mechanics as well as a patellofemoral alignment program.  She will follow-up if not improved.  If she is not improved advanced imaging could be " considered.

## 2024-06-03 NOTE — TELEPHONE ENCOUNTER
DIAGNOSIS: My knee pops loudly and fairly frequently. Currently there is no pain, but I do feel pressure and tightness. This knee was previously injured.     APPOINTMENT DATE: 6.3.24   NOTES STATUS DETAILS   MEDICATION LIST Internal

## 2024-06-03 NOTE — LETTER
"  6/3/2024      RE: Durga Flannery  [address] [place] [id number]     Dear Colleague,    Thank you for referring your patient, Durga Flannery, to the [facility] [place]. Please see a copy of my visit note below.    Sports Medicine Clinic Visit    PCP: Melisa Garcia    Durga Flannery is a 39 year old female who is seen  as self referral presenting with left sided knee popping, \"pressure sensation\", sean with long periods of walking.  No terrence swelling Previous knee injury approx 2-3 years ago, walking down wet steps and slipped twisting her knee. Noted bruising afterwards but never had it evaluated at the time.  Patient denies consistent mechanical problems with her knee since the injury however.    Patient is involved in Olympic style weight lifting with a bar and plates as well as hand-held weights with squats and lunges.  Patient attends a weightlifting gym.      Location of Pain: left knee  Duration of Pain: 4 day(s)  Rating of Pain: 0/10  Pain is better with: Rest  Pain is worse with: NA  Additional Features: popping, pressure  Treatment so far consists of: Rest  Prior History of related problems: Previous knee injury walking down wet steps and slipped twisting her knee. Noted bruising afterwards but never had it evaluated    There were no vitals taken for this visit.        Patient in February 2024 was involved in gym workouts including cardio workouts, jogging a mile.  Strength training 3-4 times weekly.    Past history of sleeve gastrectomy for obesity 2015, [facility].. Down roughly 100 lbs since surgery. Lowest weight since surgery was 225 lbs.  Status post nutrition referral and nutrition counseling 2022.  Medical weight management consult 12/15/2022. 12/15/2022 height of 5' 5.75\"[pt reported[, a weight of 258 lbs 0 oz, and the calculated Body mass index is 41.96 kg/m .  2/2/2024 weight 264 pounds 0 ounces.  Surgery clinic note 2/2/2024 " reviewed by me.      PMH:  Past Medical History:   Diagnosis Date    Current moderate episode of major depressive disorder, unspecified whether recurrent (H) 3/20/2023    Hypertension      Past Surgical History:   Procedure Laterality Date    LAPAROSCOPIC GASTRIC SLEEVE   2015     [facility]    WISDOM TOOTH EXTRACTION         Active problem list:  Patient Active Problem List   Diagnosis    Panic attack    Generalized anxiety disorder    Morbid obesity (H)    History of thyroid nodule    History of sleeve gastrectomy    Current moderate episode of major depressive disorder, unspecified whether recurrent (H)    PCOS (polycystic ovarian syndrome)       FH:  Family History   Problem Relation Age of Onset    Uterine Cancer Mother     Thyroid Disease Mother     Other Cancer Mother     Diabetes Father             Hypertension Father     Asthma Father     Schizophrenia Sister     Depression Sister     Anxiety Disorder Sister     Mental Illness Sister     Cerebrovascular Disease Maternal Grandmother     Cerebrovascular Disease Maternal Grandfather     Breast Cancer No family hx of     Ovarian Cancer No family hx of     Colorectal Cancer No family hx of        SH:  Social History     Socioeconomic History    Marital status: Single     Spouse name: Not on file    Number of children: Not on file    Years of education: Not on file    Highest education level: Not on file   Occupational History    Not on file   Tobacco Use    Smoking status: Never     Passive exposure: Never    Smokeless tobacco: Never   Vaping Use    Vaping status: Never Used   Substance and Sexual Activity    Alcohol use: Yes     Comment: 3/ week    Drug use: Never    Sexual activity: Yes     Partners: Male     Birth control/protection: Condom     Comment: Plan B   Other Topics Concern    Parent/sibling w/ CABG, MI or angioplasty before 65F 55M? Yes     Comment: Father   Social History Narrative    2022: from [place], family originated from [place]  in [place], did college in [place]. went to college in [place], went back to [place] and came to [place] for work at the start of the pandemic.  Mother's family originate from [place].  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 Lives alone . No pets . Some family in the [place] [facility] [facility] but none in [place]      Social Determinants of Health     Financial Resource Strain: Low Risk  (3/29/2024)    Financial Resource Strain     Within the past 12 months, have you or your family members you live with been unable to get utilities (heat, electricity) when it was really needed?: No   Food Insecurity: Low Risk  (3/29/2024)    Food Insecurity     Within the past 12 months, did you worry that your food would run out before you got money to buy more?: No     Within the past 12 months, did the food you bought just not last and you didn t have money to get more?: No   Transportation Needs: Low Risk  (3/29/2024)    Transportation Needs     Within the past 12 months, has lack of transportation kept you from medical appointments, getting your medicines, non-medical meetings or appointments, work, or from getting things that you need?: No   Physical Activity: Not on file   Stress: Stress Concern Present (3/29/2024)    [employer] - Occupational Stress Questionnaire     Feeling of Stress : To some extent   Social Connections: Not on file   Interpersonal Safety: Low Risk  (10/26/2023)    Interpersonal Safety     Do you feel physically and emotionally safe where you currently live?: Yes     Within the past 12 months, have you been hit, slapped, kicked or otherwise physically hurt by someone?: No     Within the past 12 months, have you been humiliated or emotionally abused in other ways by your partner or ex-partner?: No   Housing Stability: Low Risk  (3/29/2024)    Housing Stability     Do you have housing? : Yes     Are you worried about losing your housing?: No  "      MEDS:  See EMR, reviewed  ALL:  See EMR, reviewed    REVIEW OF SYSTEMS:  CONSTITUTIONAL:NEGATIVE for fever, chills, change in weight  INTEGUMENTARY/SKIN: NEGATIVE for worrisome rashes, moles or lesions  EYES: NEGATIVE for vision changes or irritation  ENT/MOUTH: NEGATIVE for ear, mouth and throat problems  RESP:NEGATIVE for significant cough or SOB  BREAST: NEGATIVE for masses, tenderness or discharge  CV: NEGATIVE for chest pain, palpitations or peripheral edema  GI: NEGATIVE for nausea, abdominal pain, heartburn, or change in bowel habits  :NEGATIVE for frequency, dysuria, or hematuria  :NEGATIVE for frequency, dysuria, or hematuria  NEURO: NEGATIVE for weakness, dizziness or paresthesias  ENDOCRINE: NEGATIVE for temperature intolerance, skin/hair changes  HEME/ALLERGY/IMMUNE: NEGATIVE for bleeding problems  PSYCHIATRIC: NEGATIVE for changes in mood or affect          Inspection:       No effusion      AP/lateral alignment normal      Non-tender: patellar facets, MCL, LCL, lateral joint line, medial joint line, IT band, pes anserine bursa, fibular head    Symmetrical medial and lateral patellar excursion, with no \"winking\" knee cap or obvious patella daniela    Active Range of Motion: full extension.  flexion allowed beyond 100 degrees.    Strength: quad  5/5, Hamstrings  5/5, Gastroc  5/5, Tibialis anterior  5/5, Peroneals  5/5     Special tests: normal Valgus stress test, normal Varus, negative Lachman's test, negative pivot shift, negative posterior drawer, no posterolateral corner signs, negative Jacqui's, no apprehension with lateral stress of the patella.    Sensation intact.  Distal pulses intact.  Capillary refill normal.  Skin intact, without signs of cellulitis.    Neutral heel with neutral forefoot.      I personally viewed the patient x-rays of the left knee that show no significant degenerative change or bony abnormality.    Assessment: Left-sided knee patellofemoral discomfort    Plan: She " would like to see a sports physical therapist, Kika at [facility] for proper squat and lunge mechanics as well as a patellofemoral alignment program.  She will follow-up if not improved.  If she is not improved advanced imaging could be considered.      Again, thank you for allowing me to participate in the care of your patient.      Sincerely,    Jaden Schwartz MD

## 2024-06-05 ENCOUNTER — THERAPY VISIT (OUTPATIENT)
Dept: PHYSICAL THERAPY | Facility: CLINIC | Age: 39
End: 2024-06-05
Attending: FAMILY MEDICINE
Payer: COMMERCIAL

## 2024-06-05 DIAGNOSIS — M25.562 PATELLOFEMORAL ARTHRALGIA OF LEFT KNEE: ICD-10-CM

## 2024-06-05 PROCEDURE — 97161 PT EVAL LOW COMPLEX 20 MIN: CPT | Mod: GP

## 2024-06-05 PROCEDURE — 97110 THERAPEUTIC EXERCISES: CPT | Mod: GP

## 2024-06-05 ASSESSMENT — ACTIVITIES OF DAILY LIVING (ADL)
SQUAT: ACTIVITY IS NOT DIFFICULT
LIMPING: I HAVE THE SYMPTOM BUT IT DOES NOT AFFECT MY ACTIVITY
GO UP STAIRS: ACTIVITY IS NOT DIFFICULT
RISE FROM A CHAIR: ACTIVITY IS NOT DIFFICULT
RISE FROM A CHAIR: ACTIVITY IS NOT DIFFICULT
AS_A_RESULT_OF_YOUR_KNEE_INJURY,_HOW_WOULD_YOU_RATE_YOUR_CURRENT_LEVEL_OF_DAILY_ACTIVITY?: ABNORMAL
GIVING WAY, BUCKLING OR SHIFTING OF KNEE: THE SYMPTOM AFFECTS MY ACTIVITY SLIGHTLY
PAIN: I DO NOT HAVE THE SYMPTOM
HOW_WOULD_YOU_RATE_THE_OVERALL_FUNCTION_OF_YOUR_KNEE_DURING_YOUR_USUAL_DAILY_ACTIVITIES?: NEARLY NORMAL
GO UP STAIRS: ACTIVITY IS NOT DIFFICULT
KNEE_ACTIVITY_OF_DAILY_LIVING_SUM: 63
WALK: ACTIVITY IS MINIMALLY DIFFICULT
HOW_WOULD_YOU_RATE_THE_OVERALL_FUNCTION_OF_YOUR_KNEE_DURING_YOUR_USUAL_DAILY_ACTIVITIES?: NEARLY NORMAL
STIFFNESS: THE SYMPTOM AFFECTS MY ACTIVITY SLIGHTLY
PLEASE_INDICATE_YOR_PRIMARY_REASON_FOR_REFERRAL_TO_THERAPY:: KNEE
STAND: ACTIVITY IS MINIMALLY DIFFICULT
KNEEL ON THE FRONT OF YOUR KNEE: ACTIVITY IS NOT DIFFICULT
LIMPING: I HAVE THE SYMPTOM BUT IT DOES NOT AFFECT MY ACTIVITY
KNEEL ON THE FRONT OF YOUR KNEE: ACTIVITY IS NOT DIFFICULT
SIT WITH YOUR KNEE BENT: ACTIVITY IS NOT DIFFICULT
SQUAT: ACTIVITY IS NOT DIFFICULT
HOW_WOULD_YOU_RATE_THE_CURRENT_FUNCTION_OF_YOUR_KNEE_DURING_YOUR_USUAL_DAILY_ACTIVITIES_ON_A_SCALE_FROM_0_TO_100_WITH_100_BEING_YOUR_LEVEL_OF_KNEE_FUNCTION_PRIOR_TO_YOUR_INJURY_AND_0_BEING_THE_INABILITY_TO_PERFORM_ANY_OF_YOUR_USUAL_DAILY_ACTIVITIES?: 85
WALK: ACTIVITY IS MINIMALLY DIFFICULT
SWELLING: I DO NOT HAVE THE SYMPTOM
KNEE_ACTIVITY_OF_DAILY_LIVING_SCORE: 90
STIFFNESS: THE SYMPTOM AFFECTS MY ACTIVITY SLIGHTLY
HOW_WOULD_YOU_RATE_THE_CURRENT_FUNCTION_OF_YOUR_KNEE_DURING_YOUR_USUAL_DAILY_ACTIVITIES_ON_A_SCALE_FROM_0_TO_100_WITH_100_BEING_YOUR_LEVEL_OF_KNEE_FUNCTION_PRIOR_TO_YOUR_INJURY_AND_0_BEING_THE_INABILITY_TO_PERFORM_ANY_OF_YOUR_USUAL_DAILY_ACTIVITIES?: 85
SWELLING: I DO NOT HAVE THE SYMPTOM
RAW_SCORE: 63
AS_A_RESULT_OF_YOUR_KNEE_INJURY,_HOW_WOULD_YOU_RATE_YOUR_CURRENT_LEVEL_OF_DAILY_ACTIVITY?: ABNORMAL
GIVING WAY, BUCKLING OR SHIFTING OF KNEE: THE SYMPTOM AFFECTS MY ACTIVITY SLIGHTLY
STAND: ACTIVITY IS MINIMALLY DIFFICULT
WEAKNESS: I DO NOT HAVE THE SYMPTOM
GO DOWN STAIRS: ACTIVITY IS NOT DIFFICULT
WEAKNESS: I DO NOT HAVE THE SYMPTOM
SIT WITH YOUR KNEE BENT: ACTIVITY IS NOT DIFFICULT
GO DOWN STAIRS: ACTIVITY IS NOT DIFFICULT
PAIN: I DO NOT HAVE THE SYMPTOM

## 2024-06-05 NOTE — PROGRESS NOTES
PHYSICAL THERAPY EVALUATION  Type of Visit: Evaluation    See electronic medical record for Abuse and Falls Screening details.    Subjective       Presenting condition or subjective complaint: Knee feels pressure and slight stiffness below front knee cap  Patient presents with complaints of her left knee. Feels a lot of pressure in the front of her knee, in the last week started having more clicking/popping. Doesn't feel a lot of pain. In 2020 twisted her knee tripping down stairs. She never got it looked at, but it seemed to resolve itself. The feeling of pressure is something that has come and gone since she injured her knee in 2020. She weight trains 2-4x/week, jiu jitsu 2-3x/week. In the last week she has avoided these things, but they haven't caused pain prior to that. Was told a few years ago she had early arthritis in her left hip, occasionally will feel a twinge there.   Lifting includes: leg presses, RDLs, back extensions, hack squats, some back squats. Will leg press around 600#, RDLs around 160#, back squats 150#.  Date of onset:      Relevant medical history: High blood pressure   Dates & types of surgery: 2015    Prior diagnostic imaging/testing results: X-ray     Prior therapy history for the same diagnosis, illness or injury: No      Prior Level of Function  Transfers: Independent  Ambulation: Independent  ADL: Independent  IADL:  Independent    Living Environment  Social support: Alone   Type of home: Apartment/condo   Stairs to enter the home: Yes 40 Is there a railing: Yes   Ramp: No   Stairs inside the home: No       Help at home: None  Equipment owned:       Employment: Yes   Hobbies/Interests: Strength training, paige mayes, walking    Patient goals for therapy: I want to ensure my current training wont exacerbate an issue    Pain assessment: See objective evaluation for additional pain details     Objective   KEY FINDINGS:  Good LE strength  Mild deficit in SL squat  Full and pain  free knee ROM    KNEE EVALUATION  PAIN: Pain Level at Rest: 0/10  Pain Level with Use: 4/10  Pain Location: below knee cap and down a little into lateral shin  Pain Quality: pressure  Other symptoms: clicking/popping. Denies any swelling, numbness/tingling  Pain Frequency: intermittent, though increasing in frequency  Time dependent: no  Pain is Exacerbated By: walking  Pain is Relieved By: stretch and foam roller  Pain Progression: worse in the last week  INTEGUMENTARY (edema, incisions):   POSTURE:   GAIT:  Weightbearing Status:   Assistive Device(s):   Gait Deviations: WNL  BALANCE/PROPRIOCEPTION:   WEIGHTBEARING ALIGNMENT:   NON-WEIGHTBEARING ALIGNMENT:   ROM:   Lower Extremity ROM   Left (PROM) Left (AROM) Right (PROM) Right (AROM)   Hip Flex WNL  WNL    Hip Ext       Hip ER WNL  WNL    Hip IR WNL  WNL    Hip ABD       Hip ADD       Knee ROM  Full and pain free w/ OP into flexion/extension  ~5 deg hyperextension  Full and pain free w/ OP into flexion/extension  ~5 deg hyperextension     STRENGTH:   Lower Extremity Muscle Strength   Left Right   Hip Flex 5 5   Hip Ext 5 5   Hip ER 5 5   Hip IR 5 5   Hip ABD 5 5   Hip ADD     Knee Ext 5 5   Knee Flex 5 5   Quad Set Normal Normal   *indicates pain    FLEXIBILITY:  hamstring WNL, mild quad tightness  SPECIAL TESTS:   FUNCTIONAL TESTS: Double Leg Squat: Good technique/no significant findings and Able to perform full deep squat without pain  Single Leg Squat: mild proprioceptive difficulty, generally good form  PALPATION: WNL  JOINT MOBILITY:  patellar passive mobility WNL. Slight increased lateralization R>L  w/ OKC knee extension. Static assessment in 90 deg knee flexion and full extension LEIDY lateral tilt.      Assessment & Plan   CLINICAL IMPRESSIONS  Medical Diagnosis: Patellofemoral arthralgia of left knee    Treatment Diagnosis: L knee pain   Impression/Assessment: Patient is a 39 year old female with L knee complaints.  The following significant findings have  been identified: Pain, Decreased ROM/flexibility, Decreased strength, and Decreased activity tolerance. These impairments interfere with their ability to perform recreational activities, household mobility, and community mobility as compared to previous level of function.     Clinical Decision Making (Complexity):  Clinical Presentation: Stable/Uncomplicated  Clinical Presentation Rationale: based on medical and personal factors listed in PT evaluation  Clinical Decision Making (Complexity): Low complexity    PLAN OF CARE  Treatment Interventions:  Modalities: Dry Needling  Interventions: Gait Training, Manual Therapy, Neuromuscular Re-education, Therapeutic Activity, Therapeutic Exercise, Self-Care/Home Management    Long Term Goals     PT Goal 1  Goal Identifier: Walking  Goal Description: Patient will be able to walk for at least 30 min w/o increase in pain or significant clicking  Rationale: to maximize safety and independence with performance of ADLs and functional tasks;to maximize safety and independence within the home;to maximize safety and independence within the community  Target Date: 08/28/24  PT Goal 2  Goal Identifier: Weight lifting  Goal Description: patient will be able to fully participate in heavy weight lifting w/o increased knee pain  Target Date: 08/28/24      Frequency of Treatment: 2x/month  Duration of Treatment: 3 months    Recommended Referrals to Other Professionals:  none  Education Assessment:   Learner/Method: Patient;Demonstration;Pictures/Video;No Barriers to Learning    Risks and benefits of evaluation/treatment have been explained.   Patient/Family/caregiver agrees with Plan of Care.     Evaluation Time:     PT Eval, Low Complexity Minutes (25351): 20       Signing Clinician: Arlene Khan PT

## 2024-06-26 ENCOUNTER — MYC MEDICAL ADVICE (OUTPATIENT)
Dept: FAMILY MEDICINE | Facility: CLINIC | Age: 39
End: 2024-06-26
Payer: COMMERCIAL

## 2024-06-28 ENCOUNTER — HOSPITAL ENCOUNTER (OUTPATIENT)
Dept: MAMMOGRAPHY | Facility: CLINIC | Age: 39
Discharge: HOME | End: 2024-06-28
Attending: PHYSICIAN ASSISTANT | Admitting: PHYSICIAN ASSISTANT
Payer: COMMERCIAL

## 2024-06-28 DIAGNOSIS — Z12.31 ENCOUNTER FOR SCREENING MAMMOGRAM FOR BREAST CANCER: ICD-10-CM

## 2024-06-28 PROCEDURE — 77063 BREAST TOMOSYNTHESIS BI: CPT

## 2024-08-20 ENCOUNTER — VIRTUAL VISIT (OUTPATIENT)
Dept: FAMILY MEDICINE | Facility: CLINIC | Age: 39
End: 2024-08-20
Payer: COMMERCIAL

## 2024-08-20 DIAGNOSIS — Z11.3 SCREEN FOR STD (SEXUALLY TRANSMITTED DISEASE): Primary | ICD-10-CM

## 2024-08-20 PROCEDURE — 99213 OFFICE O/P EST LOW 20 MIN: CPT | Mod: 95 | Performed by: PHYSICIAN ASSISTANT

## 2024-08-20 ASSESSMENT — ANXIETY QUESTIONNAIRES
IF YOU CHECKED OFF ANY PROBLEMS ON THIS QUESTIONNAIRE, HOW DIFFICULT HAVE THESE PROBLEMS MADE IT FOR YOU TO DO YOUR WORK, TAKE CARE OF THINGS AT HOME, OR GET ALONG WITH OTHER PEOPLE: NOT DIFFICULT AT ALL
6. BECOMING EASILY ANNOYED OR IRRITABLE: NOT AT ALL
5. BEING SO RESTLESS THAT IT IS HARD TO SIT STILL: NOT AT ALL
3. WORRYING TOO MUCH ABOUT DIFFERENT THINGS: SEVERAL DAYS
GAD7 TOTAL SCORE: 2
8. IF YOU CHECKED OFF ANY PROBLEMS, HOW DIFFICULT HAVE THESE MADE IT FOR YOU TO DO YOUR WORK, TAKE CARE OF THINGS AT HOME, OR GET ALONG WITH OTHER PEOPLE?: NOT DIFFICULT AT ALL
7. FEELING AFRAID AS IF SOMETHING AWFUL MIGHT HAPPEN: NOT AT ALL
7. FEELING AFRAID AS IF SOMETHING AWFUL MIGHT HAPPEN: NOT AT ALL
GAD7 TOTAL SCORE: 2
4. TROUBLE RELAXING: NOT AT ALL
1. FEELING NERVOUS, ANXIOUS, OR ON EDGE: SEVERAL DAYS
GAD7 TOTAL SCORE: 2
2. NOT BEING ABLE TO STOP OR CONTROL WORRYING: NOT AT ALL

## 2024-08-20 ASSESSMENT — PATIENT HEALTH QUESTIONNAIRE - PHQ9
10. IF YOU CHECKED OFF ANY PROBLEMS, HOW DIFFICULT HAVE THESE PROBLEMS MADE IT FOR YOU TO DO YOUR WORK, TAKE CARE OF THINGS AT HOME, OR GET ALONG WITH OTHER PEOPLE: NOT DIFFICULT AT ALL
SUM OF ALL RESPONSES TO PHQ QUESTIONS 1-9: 3
SUM OF ALL RESPONSES TO PHQ QUESTIONS 1-9: 3

## 2024-08-20 NOTE — PROGRESS NOTES
Durga is a 39 year old who is being evaluated via a billable video visit.    How would you like to obtain your AVS? MyChart  If the video visit is dropped, the invitation should be resent by: Text to cell phone: [phone]  Will anyone else be joining your video visit? No      Assessment & Plan     Screen for STD (sexually transmitted disease)  Labs in place and patient will make lab only appointment to get done. Discussed importance of safe sex practices and condom use with patient.   - Treponema Abs w Reflex to RPR and Titer; Future  - HIV Antigen Antibody Combo Cascade; Future  - Chlamydia trachomatis/Neisseria gonorrhoeae by PCR; Future  - Wet preparation; Future      See Patient Instructions    Subjective   Durga is a 39 year old, presenting for the following health issues:  STD    History of Present Illness       Reason for visit:  Interested in STD testing, was told I need lab orders    She eats 2-3 servings of fruits and vegetables daily.She consumes 0 sweetened beverage(s) daily.She exercises with enough effort to increase her heart rate 20 to 29 minutes per day.  She exercises with enough effort to increase her heart rate 4 days per week. She is missing 3 dose(s) of medications per week.  She is not taking prescribed medications regularly due to remembering to take.     No known exposures or current symptoms.      Review of Systems  Constitutional, HEENT, cardiovascular, pulmonary, gi and gu systems are negative, except as otherwise noted.      Objective    Vitals - Patient Reported  Pain Score: No Pain (0)        Physical Exam   GENERAL: alert and no distress  EYES: Eyes grossly normal to inspection.  No discharge or erythema, or obvious scleral/conjunctival abnormalities.  RESP: No audible wheeze, cough, or visible cyanosis.    SKIN: Visible skin clear. No significant rash, abnormal pigmentation or lesions.  NEURO: Cranial nerves grossly intact.  Mentation and speech appropriate for age.  PSYCH:  Appropriate affect, tone, and pace of words          Video-Visit Details    Type of service:  Video Visit   Originating Location (pt. Location): Home    Distant Location (provider location):  On-site  Platform used for Video Visit: Марина  Signed Electronically by: Luís Garcia PA-C

## 2024-09-11 ENCOUNTER — ANCILLARY PROCEDURE (OUTPATIENT)
Dept: GENERAL RADIOLOGY | Facility: CLINIC | Age: 39
End: 2024-09-11
Attending: FAMILY MEDICINE
Payer: COMMERCIAL

## 2024-09-11 ENCOUNTER — OFFICE VISIT (OUTPATIENT)
Dept: URGENT CARE | Facility: URGENT CARE | Age: 39
End: 2024-09-11
Payer: COMMERCIAL

## 2024-09-11 VITALS
RESPIRATION RATE: 16 BRPM | BODY MASS INDEX: 40.34 KG/M2 | WEIGHT: 254 LBS | DIASTOLIC BLOOD PRESSURE: 87 MMHG | HEART RATE: 81 BPM | SYSTOLIC BLOOD PRESSURE: 122 MMHG | TEMPERATURE: 98.1 F | OXYGEN SATURATION: 100 %

## 2024-09-11 DIAGNOSIS — M54.2 NECK PAIN: ICD-10-CM

## 2024-09-11 DIAGNOSIS — M54.2 NECK PAIN: Primary | ICD-10-CM

## 2024-09-11 PROCEDURE — 99214 OFFICE O/P EST MOD 30 MIN: CPT | Performed by: FAMILY MEDICINE

## 2024-09-11 PROCEDURE — 72040 X-RAY EXAM NECK SPINE 2-3 VW: CPT | Mod: TC | Performed by: RADIOLOGY

## 2024-09-11 RX ORDER — METHOCARBAMOL 500 MG/1
500 TABLET, FILM COATED ORAL 4 TIMES DAILY PRN
Qty: 30 TABLET | Refills: 0 | Status: SHIPPED | OUTPATIENT
Start: 2024-09-11

## 2024-09-11 NOTE — PROGRESS NOTES
SUBJECTIVE:  Chief Complaint   Patient presents with    Urgent Care     Possible over stretch her neck during [employer] class yesterday, she doesn't feel any pain, but ambrosioing      Durga Flannery is a 39 year old female who presents with a chief complaint of neck pain, in [facility] and the [facility] yesterday    Involved in [facility] and the [facility] for the past 3-4 months, was getting into a flip/tumbling move and ended up landing more with bent head.  Did not hurt at that time but notice more discomfort this morning.    Patient denies any radiation into arm.  Feels more pressure along right side and base of head.  Able to move neck.  Had a bout of sciatica, doing weight lifting.    Had not tried anything, applied ice to area when arrive at Urgent Care.      Past Medical History:   Diagnosis Date    Current moderate episode of major depressive disorder, unspecified whether recurrent (H) 3/20/2023    Hypertension      Current Outpatient Medications   Medication Sig Dispense Refill    lisinopril-hydrochlorothiazide (ZESTORETIC) 20-25 MG tablet Take 1 tablet by mouth daily 90 tablet 1    Multiple Vitamins-Minerals (HAIR SKIN AND NAILS FORMULA) TABS       propranolol (INDERAL) 10 MG tablet Take 1 tablet (10 mg) by mouth 3 times daily as needed (anxiety panic attack) 270 tablet 0     Social History     Tobacco Use    Smoking status: Never     Passive exposure: Never    Smokeless tobacco: Never   Substance Use Topics    Alcohol use: Yes     Comment: 3/ week       ROS:  Review of systems negative except as stated above.    EXAM:   /87   Pulse 81   Temp 98.1  F (36.7  C)   Resp 16   Wt 115.2 kg (254 lb)   LMP 09/04/2024 (Approximate)   SpO2 100%   BMI 40.34 kg/m    GENERAL APPEARANCE: healthy, alert and no distress  NECK: supple, mild tenderness to palpation on right trapezius and base of occiput, ROM intact  EXTREMITIES: peripheral pulses normal  SKIN: no suspicious lesions or rashes  PSYCH:alert, affect bright    X-RAY was done - cervical spine  - no acute fracture, mild DJD changes, loss of disc space [id number] viewed by me    ASSESSMENT/PLAN:  (M54.2) Neck pain  (primary encounter diagnosis)  Plan: methocarbamol (ROBAXIN) 500 MG tablet, XR         Cervical Spine 2/3 Views, Spine          Referral            Reassurance given, reviewed that most likely sustained neck strain and discussed symptomatic treatment with tylenol, ibuprofen, ice/heat, gentle ROM.  RX robaxin given to help with muscle tightness/spasm.  Discussed underlying minor DJD changes, will follow up on formal xray report and notify if any abnormalities.  Referred to Spine Specialist for further evaluation and treatment.    Follow up with Spine specialist if no improvement of symptoms in 1 week    Grey Martins MD  September 11, 2024 1:56 PM

## 2024-09-11 NOTE — PATIENT INSTRUCTIONS
Okay to take ibuprofen 200 mg - 4 tablets (800 mg) every 8 hours as needed.  Okay to take tylenol 500 mg - 2 tablets (1000 mg) every 6-8 hours as needed, do not exceed 3000 mg in 24 hours.    Okay to take muscle relaxant to help with muscle spasm/tightness    Follow up with Spine Specialist

## 2024-09-14 ENCOUNTER — OFFICE VISIT (OUTPATIENT)
Dept: ORTHOPEDICS | Facility: CLINIC | Age: 39
End: 2024-09-14
Payer: COMMERCIAL

## 2024-09-14 VITALS
DIASTOLIC BLOOD PRESSURE: 74 MMHG | SYSTOLIC BLOOD PRESSURE: 118 MMHG | BODY MASS INDEX: 39.87 KG/M2 | HEIGHT: 67 IN | WEIGHT: 254 LBS

## 2024-09-14 DIAGNOSIS — G56.22 ULNAR NEURITIS, LEFT: ICD-10-CM

## 2024-09-14 DIAGNOSIS — S16.1XXA ACUTE STRAIN OF NECK MUSCLE, INITIAL ENCOUNTER: Primary | ICD-10-CM

## 2024-09-14 DIAGNOSIS — G56.21 ULNAR NEURITIS, RIGHT: ICD-10-CM

## 2024-09-14 PROCEDURE — 99204 OFFICE O/P NEW MOD 45 MIN: CPT | Performed by: FAMILY MEDICINE

## 2024-09-14 RX ORDER — METHYLPREDNISOLONE 4 MG
TABLET, DOSE PACK ORAL
Qty: 21 TABLET | Refills: 0 | Status: SHIPPED | OUTPATIENT
Start: 2024-09-14

## 2024-09-14 NOTE — PROGRESS NOTES
ASSESSMENT & PLAN  Patient Instructions     1. Acute strain of neck muscle, initial encounter    2. Ulnar neuritis, left    3. Ulnar neuritis, right      - Patient has acute neck pain due to a muscle strain and bilateral arm numbness and tingling due to nerve irritation  -X-rays taken at urgent care center shows multilevel anterior endplate osteophytes as well as reversal of cervical lordosis.  No acute fracture, listhesis or scoliosis  -Patient will start formal physical therapy at home exercise program to teach her proper posture and positioning while she is at her desk and also to ease the stiffness and muscle soreness in her neck to allow her to return to martial arts as soon as possible  -Patient is currently taking ibuprofen 800 mg and muscle relaxers with partial relief of her symptoms.  -Patient reports nightly numbness and tingling mainly in her fourth and fifth digits likely due to excessive flexion of her elbows while she is sleeping.  Patient reports that this appeared after her neck injury and so she may have a component of cervical nerve root impingement as well contributing to her symptoms  -Patient will start an oral steroid taper.  Patient will stop her ibuprofen medications while she is taking the steroids.  Patient may continue with ibuprofen after her steroids are complete  -Patient may cancel her physical medicine rehab consultation  -Patient will follow-up if symptoms do not improve  -Call direct clinic number [[phone]] at any time with questions or concerns.    Albert Yeo MD [facility] Orthopedics and Sports Medicine  [facility]        -----    SUBJECTIVE  Durga Flannery is a/an 39 year old Right handed female who is seen as a WALK IN for evaluation of neck pain. The patient is seen by themselves.    Onset: 4 day(s) ago - 9/10/24. Patient describes injury as she was in a [employer] class. She was on her back and was rolled back and believes she hit her neck wrong. She  "noted pain the following day. She notes pain has improved since the initial onset. Patient has appt with spine specialist on 9/23/24.  Location of Pain: midline cervical spine and base of skull  Rating of Pain at worst: 6/10  Rating of Pain Currently: 0/10  Worsened by: sleeping at night with arms bent - new since injury  Better with: rest / activity avoidance  Treatments tried: rest/activity avoidance, Ibuprofen PRN, other medications: Robaxin PRN, and previous imaging (xray 9/11/24)  Quality: aching  Associated symptoms: pins & needles in hands & forearms especially at night when arms are bent; intermittent tingling in bilateral legs - anterior lower legs & posterior thighs  Orthopedic history: YES - patient reports h/o \"sciatica\" pain - did not have medical evaluation  Relevant surgical history: NO  Social history: social history: works - desk / computer work    Past Medical History:   Diagnosis Date    Current moderate episode of major depressive disorder, unspecified whether recurrent (H) 3/20/2023    Hypertension      Social History     Socioeconomic History    Marital status: Single   Tobacco Use    Smoking status: Never     Passive exposure: Never    Smokeless tobacco: Never   Vaping Use    Vaping status: Never Used   Substance and Sexual Activity    Alcohol use: Yes     Comment: 3/ week    Drug use: Never    Sexual activity: Yes     Partners: Male     Birth control/protection: Condom     Comment: Plan B   Other Topics Concern    Parent/sibling w/ CABG, MI or angioplasty before 65F 55M? Yes     Comment: Father   Social History Narrative    9/2022: from [place] originally, family originated from [place] in [place], did college in [place]. went to college in [place], went back to [place] and came to [place] for work at the start of the pandemic.  Mother's family originate from [place].  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 Lives alone . No pets . Some family in the " [facility] but none in [place]      Social Determinants of Health     Financial Resource Strain: Low Risk  (3/29/2024)    Financial Resource Strain     Within the past 12 months, have you or your family members you live with been unable to get utilities (heat, electricity) when it was really needed?: No   Food Insecurity: Low Risk  (3/29/2024)    Food Insecurity     Within the past 12 months, did you worry that your food would run out before you got money to buy more?: No     Within the past 12 months, did the food you bought just not last and you didn t have money to get more?: No   Transportation Needs: Low Risk  (3/29/2024)    Transportation Needs     Within the past 12 months, has lack of transportation kept you from medical appointments, getting your medicines, non-medical meetings or appointments, work, or from getting things that you need?: No   Stress: Stress Concern Present (3/29/2024)    [employer] - Occupational Stress Questionnaire     Feeling of Stress : To some extent   Interpersonal Safety: Low Risk  (10/26/2023)    Interpersonal Safety     Do you feel physically and emotionally safe where you currently live?: Yes     Within the past 12 months, have you been hit, slapped, kicked or otherwise physically hurt by someone?: No     Within the past 12 months, have you been humiliated or emotionally abused in other ways by your partner or ex-partner?: No   Housing Stability: Low Risk  (3/29/2024)    Housing Stability     Do you have housing? : Yes     Are you worried about losing your housing?: No         Patient's past medical, surgical, social, and family histories were reviewed today and no changes are noted.    REVIEW OF SYSTEMS:  10 point ROS is negative other than symptoms noted above in HPI, Past Medical History or as stated below  Constitutional: NEGATIVE for fever, chills, change in weight  Skin: NEGATIVE for worrisome rashes, moles or lesions  GI/: NEGATIVE for bowel  "or bladder changes  Neuro: NEGATIVE for weakness, dizziness or paresthesias    OBJECTIVE:  /74   Ht 1.69 m (5' 6.54\")   Wt 115.2 kg (254 lb)   LMP 09/04/2024 (Approximate)   BMI 40.33 kg/m     General: healthy, alert and in no distress  HEENT: no scleral icterus or conjunctival erythema  Skin: no suspicious lesions or rash. No jaundice.  CV: regular rhythm by palpation  Resp: normal respiratory effort without conversational dyspnea   Psych: normal mood and affect  Gait: normal steady gait with appropriate coordination and balance  Neuro: normal light touch sensory exam of the bilateral upper extremities.    MSK:  CERVICAL SPINE  Inspection:    normal cervical lordosis present, rounded shoulders, forward head posture  Palpation:    Tender about the cervical spinous processes, paracervical musculature (bilateral), and levator scapula (bilateral). Otherwise remainder of the landmarks and nontender.  Range of Motion:     Flexion within normal limits    Extension within normal limits    Right side bend within normal limits    Left side bend within normal limits    Right rotation within normal limits    Left rotation within normal limits  Strength:    Full strength throughout all neck muscles  Special Tests:    Positive: Tinel sign positive bilateral elbows    Negative: Spurling's (bilateral), Stevens's (bilateral)        Independent visualization of the below image:  No results found for this or any previous visit (from the past 24 hour(s)).  Results for orders placed or performed in visit on 09/11/24   XR Cervical Spine 2/3 Views    Narrative    XR CERVICAL SPINE 2/3 VIEWS 9/11/2024 2:03 PM    HISTORY: neck pain; Neck pain    COMPARISON: None.       Impression    IMPRESSION: Reversal of normal cervical lordosis. No loss of vertebral  body height. Multilevel degenerative changes with loss of disc  osteophyte formation.    MARY INIGUEZ MD         SYSTEM ID:  [id number]         Albert Yeo MD [facility] " Sports and Orthopedic Care

## 2024-09-14 NOTE — LETTER
9/14/2024      Durga Flannery  [address] [place] [id number]      Dear Colleague,    Thank you for referring your patient, Durga Flannery, to the [facility] [place]. Please see a copy of my visit note below.    ASSESSMENT & PLAN  Patient Instructions     1. Acute strain of neck muscle, initial encounter    2. Ulnar neuritis, left    3. Ulnar neuritis, right      - Patient has acute neck pain due to a muscle strain and bilateral arm numbness and tingling due to nerve irritation  -X-rays taken at urgent care center shows multilevel anterior endplate osteophytes as well as reversal of cervical lordosis.  No acute fracture, listhesis or scoliosis  -Patient will start formal physical therapy at home exercise program to teach her proper posture and positioning while she is at her desk and also to ease the stiffness and muscle soreness in her neck to allow her to return to martial arts as soon as possible  -Patient is currently taking ibuprofen 800 mg and muscle relaxers with partial relief of her symptoms.  -Patient reports nightly numbness and tingling mainly in her fourth and fifth digits likely due to excessive flexion of her elbows while she is sleeping.  Patient reports that this appeared after her neck injury and so she may have a component of cervical nerve root impingement as well contributing to her symptoms  -Patient will start an oral steroid taper.  Patient will stop her ibuprofen medications while she is taking the steroids.  Patient may continue with ibuprofen after her steroids are complete  -Patient may cancel her physical medicine rehab consultation  -Patient will follow-up if symptoms do not improve  -Call direct clinic number [[phone]] at any time with questions or concerns.    Albert Yeo MD [facility]  [facility]        -----    SUBJECTIVE  Durga Flannery is a/an 39 year old Right handed female  "who is seen as a WALK IN for evaluation of neck pain. The patient is seen by themselves.    Onset: 4 day(s) ago - 9/10/24. Patient describes injury as she was in a [employer] class. She was on her back and was rolled back and believes she hit her neck wrong. She noted pain the following day. She notes pain has improved since the initial onset. Patient has appt with spine specialist on 9/23/24.  Location of Pain: midline cervical spine and base of skull  Rating of Pain at worst: 6/10  Rating of Pain Currently: 0/10  Worsened by: sleeping at night with arms bent - new since injury  Better with: rest / activity avoidance  Treatments tried: rest/activity avoidance, Ibuprofen PRN, other medications: Robaxin PRN, and previous imaging (xray 9/11/24)  Quality: aching  Associated symptoms: pins & needles in hands & forearms especially at night when arms are bent; intermittent tingling in bilateral legs - anterior lower legs & posterior thighs  Orthopedic history: YES - patient reports h/o \"sciatica\" pain - did not have medical evaluation  Relevant surgical history: NO  Social history: social history: works - desk / computer work    Past Medical History:   Diagnosis Date     Current moderate episode of major depressive disorder, unspecified whether recurrent (H) 3/20/2023     Hypertension      Social History     Socioeconomic History     Marital status: Single   Tobacco Use     Smoking status: Never     Passive exposure: Never     Smokeless tobacco: Never   Vaping Use     Vaping status: Never Used   Substance and Sexual Activity     Alcohol use: Yes     Comment: 3/ week     Drug use: Never     Sexual activity: Yes     Partners: Male     Birth control/protection: Condom     Comment: Plan B   Other Topics Concern     Parent/sibling w/ CABG, MI or angioplasty before 65F 55M? Yes     Comment: Father   Social History Narrative    9/2022: from [place] originally, family originated from [place] in [place], did college in [place]. went to " college in [place], went back to [place] [facility] and came to [place] for work at the start of the pandemic.  Mother's family originate from [place].  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 Lives alone . No pets . Some family in the [place] but [place] in [place]      Social Determinants of Health     Financial Resource Strain: Low Risk  (3/29/2024)    Financial Resource Strain      Within the past 12 months, have you or your family members you live with been unable to get utilities (heat, electricity) when it was really needed?: No   Food Insecurity: Low Risk  (3/29/2024)    Food Insecurity      Within the past 12 months, did you worry that your food would run out before you got money to buy more?: No      Within the past 12 months, did the food you bought just not last and you didn t have money to get more?: No   Transportation Needs: Low Risk  (3/29/2024)    Transportation Needs      Within the past 12 months, has lack of transportation kept you from medical appointments, getting your medicines, non-medical meetings or appointments, work, or from getting things that you need?: No   Stress: Stress Concern Present (3/29/2024)    [employer] [place] [employer] - Occupational Stress Questionnaire      Feeling of Stress : To some extent   Interpersonal Safety: Low Risk  (10/26/2023)    Interpersonal Safety      Do you feel physically and emotionally safe where you currently live?: Yes      Within the past 12 months, have you been hit, slapped, kicked or otherwise physically hurt by someone?: No      Within the past 12 months, have you been humiliated or emotionally abused in other ways by your partner or ex-partner?: No   Housing Stability: Low Risk  (3/29/2024)    Housing Stability      Do you have housing? : Yes      Are you worried about losing your housing?: No         Patient's past medical, surgical, social, and family histories were reviewed today and no changes  "are noted.    REVIEW OF SYSTEMS:  10 point ROS is negative other than symptoms noted above in HPI, Past Medical History or as stated below  Constitutional: NEGATIVE for fever, chills, change in weight  Skin: NEGATIVE for worrisome rashes, moles or lesions  GI/: NEGATIVE for bowel or bladder changes  Neuro: NEGATIVE for weakness, dizziness or paresthesias    OBJECTIVE:  /74   Ht 1.69 m (5' 6.54\")   Wt 115.2 kg (254 lb)   LMP 09/04/2024 (Approximate)   BMI 40.33 kg/m     General: healthy, alert and in no distress  HEENT: no scleral icterus or conjunctival erythema  Skin: no suspicious lesions or rash. No jaundice.  CV: regular rhythm by palpation  Resp: normal respiratory effort without conversational dyspnea   Psych: normal mood and affect  Gait: normal steady gait with appropriate coordination and balance  Neuro: normal light touch sensory exam of the bilateral upper extremities.    MSK:  CERVICAL SPINE  Inspection:    normal cervical lordosis present, rounded shoulders, forward head posture  Palpation:    Tender about the cervical spinous processes, paracervical musculature (bilateral), and levator scapula (bilateral). Otherwise remainder of the landmarks and nontender.  Range of Motion:     Flexion within normal limits    Extension within normal limits    Right side bend within normal limits    Left side bend within normal limits    Right rotation within normal limits    Left rotation within normal limits  Strength:    Full strength throughout all neck muscles  Special Tests:    Positive: Tinel sign positive bilateral elbows    Negative: Spurling's (bilateral), Stevens's (bilateral)        Independent visualization of the below image:  No results found for this or any previous visit (from the past 24 hour(s)).  Results for orders placed or performed in visit on 09/11/24   XR Cervical Spine 2/3 Views    Narrative    XR CERVICAL SPINE 2/3 VIEWS 9/11/2024 2:03 PM    HISTORY: neck pain; Neck " pain    COMPARISON: None.       Impression    IMPRESSION: Reversal of normal cervical lordosis. No loss of vertebral  body height. Multilevel degenerative changes with loss of disc  osteophyte formation.    MARY INIGUEZ MD         SYSTEM ID:  [id number]         Albert Yeo MD [facility] Sports and Orthopedic Care      Again, thank you for allowing me to participate in the care of your patient.        Sincerely,        Albert Yeo, MD

## 2024-09-14 NOTE — PATIENT INSTRUCTIONS
1. Acute strain of neck muscle, initial encounter    2. Ulnar neuritis, left    3. Ulnar neuritis, right      - Patient has acute neck pain due to a muscle strain and bilateral arm numbness and tingling due to nerve irritation  -X-rays taken at urgent care center shows multilevel anterior endplate osteophytes as well as reversal of cervical lordosis.  No acute fracture, listhesis or scoliosis  -Patient will start formal physical therapy at home exercise program to teach her proper posture and positioning while she is at her desk and also to ease the stiffness and muscle soreness in her neck to allow her to return to martial arts as soon as possible  -Patient is currently taking ibuprofen 800 mg and muscle relaxers with partial relief of her symptoms.  -Patient reports nightly numbness and tingling mainly in her fourth and fifth digits likely due to excessive flexion of her elbows while she is sleeping.  Patient reports that this appeared after her neck injury and so she may have a component of cervical nerve root impingement as well contributing to her symptoms  -Patient will start an oral steroid taper.  Patient will stop her ibuprofen medications while she is taking the steroids.  Patient may continue with ibuprofen after her steroids are complete  -Patient may cancel her physical medicine rehab consultation  -Patient will follow-up if symptoms do not improve  -Call direct clinic number [[phone]] at any time with questions or concerns.    Albert Yeo MD [facility] Orthopedics and Sports Medicine  [facility] [place]

## 2024-09-16 ENCOUNTER — PATIENT OUTREACH (OUTPATIENT)
Dept: CARE COORDINATION | Facility: CLINIC | Age: 39
End: 2024-09-16
Payer: COMMERCIAL

## 2024-09-18 ENCOUNTER — PATIENT OUTREACH (OUTPATIENT)
Dept: CARE COORDINATION | Facility: CLINIC | Age: 39
End: 2024-09-18
Payer: COMMERCIAL

## 2024-09-20 ENCOUNTER — THERAPY VISIT (OUTPATIENT)
Dept: PHYSICAL THERAPY | Facility: CLINIC | Age: 39
End: 2024-09-20
Payer: COMMERCIAL

## 2024-09-20 ENCOUNTER — MYC REFILL (OUTPATIENT)
Dept: FAMILY MEDICINE | Facility: CLINIC | Age: 39
End: 2024-09-20

## 2024-09-20 DIAGNOSIS — G56.22 ULNAR NEURITIS, LEFT: ICD-10-CM

## 2024-09-20 DIAGNOSIS — G56.21 ULNAR NEURITIS, RIGHT: ICD-10-CM

## 2024-09-20 DIAGNOSIS — S16.1XXA ACUTE STRAIN OF NECK MUSCLE, INITIAL ENCOUNTER: ICD-10-CM

## 2024-09-20 PROCEDURE — 97110 THERAPEUTIC EXERCISES: CPT | Mod: GP | Performed by: PHYSICAL THERAPIST

## 2024-09-20 PROCEDURE — 97161 PT EVAL LOW COMPLEX 20 MIN: CPT | Mod: GP | Performed by: PHYSICAL THERAPIST

## 2024-09-20 NOTE — TELEPHONE ENCOUNTER
Patient left voicemail stating she saw Dr. Yeo last week. She thinks she tried to go back to the gym too soon and is having pain again. She would like a refill of the medication Dr. Yeo prescribed.     CORINE Acosta RN

## 2024-09-20 NOTE — PROGRESS NOTES
PHYSICAL THERAPY EVALUATION  Type of Visit: Evaluation       Fall Risk Screen:  Have you fallen 2 or more times in the past year?: No  Have you fallen and had an injury in the past year?: Yes  Is patient a fall risk?: No    Subjective       Presenting condition or subjective complaint: Neck and base of head discomfort. Originally hyperflexed neck while performing rolling maneuver in [facility]. Completed steroids and felt better. Went to [facility] last night, and felt some of the pain return today. Not as bad as original injury. Continues to have numbness in both hands L>R  Date of onset: 09/14/24 (MD order)    Relevant medical history: Neck injury   Dates & types of surgery: none    Prior diagnostic imaging/testing results: X-ray     Prior therapy history for the same diagnosis, illness or injury:        Prior Level of Function  Transfers: Independent  Ambulation: Independent  ADL: Independent  IADL: Driving, Finances, Housekeeping, Laundry, Meal preparation, Work    Living Environment  Social support: Alone   Type of home: Apartment/condo   Stairs to enter the home:         Ramp: No   Stairs inside the home: No       Help at home: None  Equipment owned:       Employment: Yes   Hobbies/Interests: [email or web address]    Patient goals for therapy: improve posture    Pain assessment: Location: middle of cervical spine from base of neck to base of skull,/Rating: 3/10 today     Objective   CERVICAL SPINE EVALUATION  PAIN: Pain is Exacerbated By: bending head forward  Pain is Relieved By: cold, NSAIDs, and rest    POSTURE: Sitting Posture: Rounded shoulders, Forward head, Thoracic kyphosis increased    ROM:   (Degrees) Left AROM Right AROM    Cervical Flexion Nil loss + increased numbness and tingling    Cervical Extension Nil loss    Cervical Side bend Nil loss Nil loss    Cervical Rotation Nil oss Nil loss    Cervical Protrusion Nil loss    Cervical Retraction Min loss    Thoracic Flexion Nil loss    Thoracic  Extension Min loss    Thoracic Rotation Nil loss Nil loss    Pain:   End Feel:     MYOTOMES:    Left Right   C1-2 (Neck Flexion) 5 5   C3 (Neck Side Bend)  5 5   C4 (Shrug) 5 5   C5 (Deltoid) 5 5   C6 (Biceps) 5 5   C7 (Triceps) 5 5   C8 (Thumb Ext) 5 5   T1 (Intrinsics) 5 5     DTR S: WNL  CORD SIGNS: WNL  DERMATOMES: WNL  NEURAL TENSION: Cervical + ulnar nerve tension test bilaterally   FLEXIBILITY: bilateral pec tightness   SPECIAL TESTS:    Left Right   Alar Ligament Negative    Cervical Flexion-Rotation Negative  Negative    Cervical Rot/Lateral Flex Negative  Negative    Compression Negative  Negative    Distraction Negative  Negative    Spurling s Negative  Negative    Thoracic Outlet Screen (Ag, Adson) Negative  Negative    Transverse Ligament Negative  Negative    Vertebral Artery Negative  Negative    Craniocervical Flexor Endurance Test Seconds: 3  Muscle Substitution:  Hyoids  SCM  Upper Trap Seconds: 3  Muscle Substitution:  Digastric  SCM  Upper Trap     PALPATION: TTP along spinous processes C4-C7  SPINAL SEGMENTAL CONCLUSIONS: stiffness C6-C7     Assessment & Plan   CLINICAL IMPRESSIONS  Medical Diagnosis: Acute strain of neck muscle, initial encounter, Ulnar neuritis, left, Ulnar neuritis, right    Treatment Diagnosis: Mechanical neck pain   Impression/Assessment: Patient is a 39 year old female with neck pain complaints.  The following significant findings have been identified: Pain, Decreased ROM/flexibility, Decreased joint mobility, Decreased strength, Impaired balance, Decreased proprioception, and Impaired sensation. These impairments interfere with their ability to perform self care tasks, recreational activities, household chores, and community mobility as compared to previous level of function.     Clinical Decision Making (Complexity):  Clinical Presentation: Stable/Uncomplicated  Clinical Presentation Rationale: based on medical and personal factors listed in PT evaluation  Clinical  Decision Making (Complexity): Low complexity    PLAN OF CARE  Treatment Interventions:  Modalities: Cryotherapy, Dry Needling  Interventions: Gait Training, Manual Therapy, Neuromuscular Re-education, Therapeutic Activity, Therapeutic Exercise, Self-Care/Home Management    Long Term Goals     PT Goal 1  Goal Identifier: Sitting  Goal Description: Patient will sit for 1 hour in desk chair with 0/10 pain in neck  Rationale: to maximize safety and independence with performance of ADLs and functional tasks;to maximize safety and independence within the community;to maximize safety and independence with self cares  Target Date: 12/13/24      Frequency of Treatment: 1x per week  Duration of Treatment: 6 weeks    Recommended Referrals to Other Professionals:  Seeing Dr. Albert Yeo   Education Assessment:   Learner/Method: Patient;No Barriers to Learning    Risks and benefits of evaluation/treatment have been explained.   Patient/Family/caregiver agrees with Plan of Care.     Evaluation Time:     PT Eval, Low Complexity Minutes (68563): 20     Signing Clinician: Ceci Mishra PT

## 2024-09-23 ENCOUNTER — MYC MEDICAL ADVICE (OUTPATIENT)
Dept: FAMILY MEDICINE | Facility: CLINIC | Age: 39
End: 2024-09-23

## 2024-09-23 NOTE — TELEPHONE ENCOUNTER
MP,  Please see below MyChart message and advise.  Saw patient 8/20 for STD testing  Thanks,  Evelin CAGE RN

## 2024-09-24 ENCOUNTER — THERAPY VISIT (OUTPATIENT)
Dept: PHYSICAL THERAPY | Facility: CLINIC | Age: 39
End: 2024-09-24
Payer: COMMERCIAL

## 2024-09-24 DIAGNOSIS — G56.22 ULNAR NEURITIS, LEFT: ICD-10-CM

## 2024-09-24 DIAGNOSIS — G56.21 ULNAR NEURITIS, RIGHT: ICD-10-CM

## 2024-09-24 DIAGNOSIS — S16.1XXA ACUTE STRAIN OF NECK MUSCLE, INITIAL ENCOUNTER: Primary | ICD-10-CM

## 2024-09-24 PROCEDURE — 97110 THERAPEUTIC EXERCISES: CPT | Mod: GP | Performed by: PHYSICAL THERAPIST

## 2024-09-25 RX ORDER — METHYLPREDNISOLONE 4 MG
TABLET, DOSE PACK ORAL
Qty: 21 TABLET | Refills: 0 | OUTPATIENT
Start: 2024-09-25

## 2024-09-25 NOTE — TELEPHONE ENCOUNTER
Phone call to patient. Apologized in the delay of getting back with her.   She states she over did it with warming up in the gym for jujitsu by running but did not actually do jujitsu. She had a lot of pain over the weekend but is doing better now. She is taking Ibuprofen and attending physical therapy. Discussed we do not usually refill steroids. Since she is doing better, recommended she continue ibuprofen and physical therapy and follow up if not improving. She verbalized understanding.     Refill denied.     CORINE Acosta RN

## 2024-09-26 ENCOUNTER — IMMUNIZATION (OUTPATIENT)
Dept: FAMILY MEDICINE | Facility: CLINIC | Age: 39
End: 2024-09-26
Payer: COMMERCIAL

## 2024-10-01 ENCOUNTER — THERAPY VISIT (OUTPATIENT)
Dept: PHYSICAL THERAPY | Facility: CLINIC | Age: 39
End: 2024-10-01
Payer: COMMERCIAL

## 2024-10-01 DIAGNOSIS — S16.1XXA ACUTE STRAIN OF NECK MUSCLE, INITIAL ENCOUNTER: Primary | ICD-10-CM

## 2024-10-01 DIAGNOSIS — G56.22 ULNAR NEURITIS, LEFT: ICD-10-CM

## 2024-10-01 DIAGNOSIS — G56.21 ULNAR NEURITIS, RIGHT: ICD-10-CM

## 2024-10-01 PROCEDURE — 97110 THERAPEUTIC EXERCISES: CPT | Mod: GP | Performed by: PHYSICAL THERAPIST

## 2024-10-14 ENCOUNTER — THERAPY VISIT (OUTPATIENT)
Dept: PHYSICAL THERAPY | Facility: CLINIC | Age: 39
End: 2024-10-14
Payer: COMMERCIAL

## 2024-10-14 DIAGNOSIS — S16.1XXA ACUTE STRAIN OF NECK MUSCLE, INITIAL ENCOUNTER: Primary | ICD-10-CM

## 2024-10-14 DIAGNOSIS — G56.22 ULNAR NEURITIS, LEFT: ICD-10-CM

## 2024-10-14 DIAGNOSIS — G56.21 ULNAR NEURITIS, RIGHT: ICD-10-CM

## 2024-10-14 PROCEDURE — 97112 NEUROMUSCULAR REEDUCATION: CPT | Mod: GP | Performed by: PHYSICAL THERAPIST

## 2024-10-14 PROCEDURE — 97110 THERAPEUTIC EXERCISES: CPT | Mod: GP | Performed by: PHYSICAL THERAPIST

## 2024-10-15 ENCOUNTER — NURSE TRIAGE (OUTPATIENT)
Dept: NURSING | Facility: CLINIC | Age: 39
End: 2024-10-15
Payer: COMMERCIAL

## 2024-10-15 ENCOUNTER — OFFICE VISIT (OUTPATIENT)
Dept: URGENT CARE | Facility: URGENT CARE | Age: 39
End: 2024-10-15
Payer: COMMERCIAL

## 2024-10-15 VITALS
TEMPERATURE: 98 F | SYSTOLIC BLOOD PRESSURE: 124 MMHG | RESPIRATION RATE: 18 BRPM | DIASTOLIC BLOOD PRESSURE: 82 MMHG | HEART RATE: 75 BPM | OXYGEN SATURATION: 100 %

## 2024-10-15 DIAGNOSIS — R30.0 DYSURIA: Primary | ICD-10-CM

## 2024-10-15 DIAGNOSIS — N10 PYELONEPHRITIS, ACUTE: ICD-10-CM

## 2024-10-15 DIAGNOSIS — Z11.3 SCREEN FOR STD (SEXUALLY TRANSMITTED DISEASE): ICD-10-CM

## 2024-10-15 LAB
ALBUMIN UR-MCNC: NEGATIVE MG/DL
APPEARANCE UR: ABNORMAL
BACTERIA #/AREA URNS HPF: ABNORMAL /HPF
BILIRUB UR QL STRIP: NEGATIVE
CLUE CELLS: ABNORMAL
COLOR UR AUTO: YELLOW
GLUCOSE UR STRIP-MCNC: NEGATIVE MG/DL
HGB UR QL STRIP: ABNORMAL
KETONES UR STRIP-MCNC: NEGATIVE MG/DL
LEUKOCYTE ESTERASE UR QL STRIP: ABNORMAL
NITRATE UR QL: NEGATIVE
PH UR STRIP: 6.5 [PH] (ref 5–7)
RBC #/AREA URNS AUTO: ABNORMAL /HPF
SP GR UR STRIP: 1.01 (ref 1–1.03)
SQUAMOUS #/AREA URNS AUTO: ABNORMAL /LPF
TRICHOMONAS, WET PREP: ABNORMAL
UROBILINOGEN UR STRIP-ACNC: 0.2 E.U./DL
WBC #/AREA URNS AUTO: ABNORMAL /HPF
WBC CLUMPS #/AREA URNS HPF: PRESENT /HPF
WBC'S/HIGH POWER FIELD, WET PREP: ABNORMAL
YEAST, WET PREP: ABNORMAL

## 2024-10-15 PROCEDURE — 36415 COLL VENOUS BLD VENIPUNCTURE: CPT | Performed by: FAMILY MEDICINE

## 2024-10-15 PROCEDURE — 87389 HIV-1 AG W/HIV-1&-2 AB AG IA: CPT | Performed by: FAMILY MEDICINE

## 2024-10-15 PROCEDURE — 81001 URINALYSIS AUTO W/SCOPE: CPT | Performed by: FAMILY MEDICINE

## 2024-10-15 PROCEDURE — 86780 TREPONEMA PALLIDUM: CPT | Performed by: FAMILY MEDICINE

## 2024-10-15 PROCEDURE — 87340 HEPATITIS B SURFACE AG IA: CPT | Performed by: FAMILY MEDICINE

## 2024-10-15 PROCEDURE — 87086 URINE CULTURE/COLONY COUNT: CPT | Performed by: FAMILY MEDICINE

## 2024-10-15 PROCEDURE — 87186 SC STD MICRODIL/AGAR DIL: CPT | Performed by: FAMILY MEDICINE

## 2024-10-15 PROCEDURE — 86706 HEP B SURFACE ANTIBODY: CPT | Performed by: FAMILY MEDICINE

## 2024-10-15 PROCEDURE — 87210 SMEAR WET MOUNT SALINE/INK: CPT | Performed by: FAMILY MEDICINE

## 2024-10-15 PROCEDURE — 86803 HEPATITIS C AB TEST: CPT | Performed by: FAMILY MEDICINE

## 2024-10-15 PROCEDURE — 86704 HEP B CORE ANTIBODY TOTAL: CPT | Performed by: FAMILY MEDICINE

## 2024-10-15 PROCEDURE — 87491 CHLMYD TRACH DNA AMP PROBE: CPT | Performed by: FAMILY MEDICINE

## 2024-10-15 PROCEDURE — 99214 OFFICE O/P EST MOD 30 MIN: CPT | Performed by: FAMILY MEDICINE

## 2024-10-15 PROCEDURE — 87591 N.GONORRHOEAE DNA AMP PROB: CPT | Performed by: FAMILY MEDICINE

## 2024-10-15 RX ORDER — CEFDINIR 300 MG/1
300 CAPSULE ORAL 2 TIMES DAILY
Qty: 20 CAPSULE | Refills: 0 | Status: SHIPPED | OUTPATIENT
Start: 2024-10-15 | End: 2024-10-25

## 2024-10-15 NOTE — TELEPHONE ENCOUNTER
Patient calling. She's having UTI symptoms, and states that she is unable to do an e-check-in for urgent care. She was given locations and wait times for three [facility], stating that she will go to [facility]. No triage.     Doreen Lopez RN  [facility]  October 15, 2024, 6:25 PM    Reason for Disposition   Health Information question, no triage required and triager able to answer question    Additional Information   Negative: [1] Caller is not with the adult (patient) AND [2] reporting urgent symptoms   Negative: Lab result questions   Negative: Medication questions   Negative: Caller can't be reached by phone   Negative: Caller has already spoken to PCP or another triager   Negative: RN needs further essential information from caller in order to complete triage   Negative: Requesting regular office appointment   Negative: [1] Caller requesting NON-URGENT health information AND [2] PCP's office is the best resource    Protocols used: Information Only Call - [facility]

## 2024-10-16 LAB
C TRACH DNA SPEC QL PROBE+SIG AMP: NEGATIVE
HBV CORE AB SERPL QL IA: NONREACTIVE
HBV SURFACE AB SERPL IA-ACNC: 11.6 M[IU]/ML
HBV SURFACE AB SERPL IA-ACNC: REACTIVE M[IU]/ML
HBV SURFACE AG SERPL QL IA: NONREACTIVE
HCV AB SERPL QL IA: NONREACTIVE
HIV 1+2 AB+HIV1 P24 AG SERPL QL IA: NONREACTIVE
N GONORRHOEA DNA SPEC QL NAA+PROBE: NEGATIVE
T PALLIDUM AB SER QL: NONREACTIVE

## 2024-10-16 NOTE — PROGRESS NOTES
SUBJECTIVE: Durga Flannery is a 39 year old female who  presents today for a possible UTI.   Symptoms of dysuria and frequency have been going on forday(s).    Hematuria yes today.  still presentand wanting std testing    Past Medical History:   Diagnosis Date    Current moderate episode of major depressive disorder, unspecified whether recurrent (H) 3/20/2023    Hypertension      Allergies   Allergen Reactions    Seasonal Allergies      Social History     Tobacco Use    Smoking status: Never     Passive exposure: Never    Smokeless tobacco: Never   Substance Use Topics    Alcohol use: Yes     Comment: 3/ week       ROS: CONSTITUTIONAL:NEGATIVE for fever, chills, change in weight    OBJECTIVE:  /82 (BP Location: Right arm, Patient Position: Sitting, Cuff Size: Adult Regular)   Pulse 75   Temp 98  F (36.7  C) (Tympanic)   Resp 18   LMP 09/04/2024 (Approximate)   SpO2 100%   NAD  left Flank pain      ICD-10-CM    1. Dysuria  R30.0 UA Macroscopic with reflex to Microscopic and Culture - Clinic Collect     Wet prep - Clinic Collect     Urine Microscopic Exam     Urine Culture      2. Screen for STD (sexually transmitted disease)  Z11.3 Chlamydia trachomatis/Neisseria gonorrhoeae by PCR - Clinic Collect     HIV Antigen Antibody Combo [HVC1162]     Hepatitis B core antibody [KPH1321]     Hepatitis B Surface Antibody [VEV0732]     Hepatitis B surface antigen [ZQO892]     Hepatitis C antibody [JIS515]     Treponema Abs w Reflex to RPR and Titer [VQH4730]      3. Pyelonephritis, acute  N10 cefdinir (OMNICEF) 300 MG capsule        Drink plenty of fluids.  Prevention and treatment of UTI's discussed.Signs and symptoms of pyelonephritis mentioned.  Follow up with primary care physician if not improving

## 2024-10-17 LAB — BACTERIA UR CULT: ABNORMAL

## 2024-10-25 ENCOUNTER — THERAPY VISIT (OUTPATIENT)
Dept: PHYSICAL THERAPY | Facility: CLINIC | Age: 39
End: 2024-10-25
Payer: COMMERCIAL

## 2024-10-25 DIAGNOSIS — G56.22 ULNAR NEURITIS, LEFT: ICD-10-CM

## 2024-10-25 DIAGNOSIS — G56.21 ULNAR NEURITIS, RIGHT: ICD-10-CM

## 2024-10-25 DIAGNOSIS — S16.1XXA ACUTE STRAIN OF NECK MUSCLE, INITIAL ENCOUNTER: Primary | ICD-10-CM

## 2024-10-25 PROCEDURE — 97110 THERAPEUTIC EXERCISES: CPT | Mod: GP | Performed by: PHYSICAL THERAPIST

## 2024-10-29 ENCOUNTER — TELEPHONE (OUTPATIENT)
Dept: OBGYN | Facility: CLINIC | Age: 39
End: 2024-10-29

## 2024-10-29 DIAGNOSIS — Z32.01 POSITIVE PREGNANCY TEST: Primary | ICD-10-CM

## 2024-10-29 NOTE — TELEPHONE ENCOUNTER
LMP 24  Periods last 4-5 days. This last one was 4 days and a bit lighter than usual. Still needed a tampon, not so light she only needed panty liner.    Used Plan B after intercourse which was about 3 weeks ago.  U/s was indicated due to the use of plan B, but according to LMP would be too early.  I am having the patient do an HCG quant level.    Scheduled for visit on .       Pre-Medication  Assessment:    Durga Flannery    When was the first day of your last period? 24 Patient is sure of LMP date.    When was your positive pregnancy test? 10/28/24       Was the test more than 52 days ago (before 24)? No    Were you using hormonal birth control, an IUD or emergency contraception when you got pregnant? YES - ULTRASOUND NEEDED  Used plan B and it did not work    Have you ever been diagnosed with an ectopic pregnancy? No    Have you ever had a tubal ligation or sterilization procedure? No    Have you ever been diagnosed with pelvic inflammatory disease? No    Are you having one-sided pelvic pain? No    How long are your typical menstrual cycles /  How many days from the start of one period to the start of the next one?  Every 28 days     During the past 3 months, has the time between periods varied from your typical cycles by more than a few days? No, cycles have been regular.    Was LMP more than 10 weeks (70 days) ago (before 24)? No    Did your last period start earlier or later than you would have expected? No    Was your last period shorter than usual? YES - ULTRASOUND NEEDED    Was the amount of bleeding/cramping in your last period normal for you? Yes    Have you had any other recent bleeding that was not normal for you? No      Have you ever been diagnosed with:    An allergy or intolerance to mifepristone or misoprostol?  No    Chronic renal failure?  No    Hemorrhagic disorders? No    Inherited porphyria? No    Have you used systemic corticosteroid therapy long term?   No    Are you currently on anticoagulation therapy (excluding aspirin)?  No    Based on the responses given by the patient, an ultrasound is indicated.      Patient denies all contraindications, will proceed with scheduling medication termination of pregnancy appointment.      Christina Harris RN

## 2024-10-31 ENCOUNTER — LAB (OUTPATIENT)
Dept: LAB | Facility: CLINIC | Age: 39
End: 2024-10-31
Payer: COMMERCIAL

## 2024-10-31 DIAGNOSIS — Z32.01 POSITIVE PREGNANCY TEST: ICD-10-CM

## 2024-10-31 LAB — HCG INTACT+B SERPL-ACNC: 438 MIU/ML

## 2024-10-31 PROCEDURE — 84702 CHORIONIC GONADOTROPIN TEST: CPT

## 2024-10-31 PROCEDURE — 36415 COLL VENOUS BLD VENIPUNCTURE: CPT

## 2024-11-04 NOTE — TELEPHONE ENCOUNTER
HCG quant looks low at 438. U/s would be inconclusive likely, will keep appt as scheduled.    Christina CERRATO RN BSN  [facility]

## 2024-11-05 ENCOUNTER — TELEPHONE (OUTPATIENT)
Dept: OBGYN | Facility: CLINIC | Age: 39
End: 2024-11-05

## 2024-11-05 ENCOUNTER — OFFICE VISIT (OUTPATIENT)
Dept: OBGYN | Facility: CLINIC | Age: 39
End: 2024-11-05
Payer: COMMERCIAL

## 2024-11-05 VITALS — BODY MASS INDEX: 42.37 KG/M2 | SYSTOLIC BLOOD PRESSURE: 130 MMHG | WEIGHT: 266.8 LBS | DIASTOLIC BLOOD PRESSURE: 90 MMHG

## 2024-11-05 DIAGNOSIS — Z33.2 TERMINATION OF PREGNANCY (FETUS): Primary | ICD-10-CM

## 2024-11-05 LAB — HCG INTACT+B SERPL-ACNC: 1186 MIU/ML

## 2024-11-05 PROCEDURE — S0190 MIFEPRISTONE, ORAL, 200 MG: HCPCS

## 2024-11-05 PROCEDURE — 84702 CHORIONIC GONADOTROPIN TEST: CPT

## 2024-11-05 PROCEDURE — 99213 OFFICE O/P EST LOW 20 MIN: CPT

## 2024-11-05 PROCEDURE — 36415 COLL VENOUS BLD VENIPUNCTURE: CPT

## 2024-11-05 RX ORDER — IBUPROFEN 800 MG/1
800 TABLET, FILM COATED ORAL EVERY 8 HOURS PRN
Qty: 21 TABLET | Refills: 0 | Status: SHIPPED | OUTPATIENT
Start: 2024-11-05

## 2024-11-05 RX ORDER — ONDANSETRON 4 MG/1
4 TABLET, FILM COATED ORAL EVERY 6 HOURS PRN
Qty: 10 TABLET | Refills: 0 | Status: SHIPPED | OUTPATIENT
Start: 2024-11-05

## 2024-11-05 RX ORDER — MIFEPRISTONE 200 MG/1
200 TABLET ORAL ONCE
Status: COMPLETED | OUTPATIENT
Start: 2024-11-05 | End: 2024-11-05

## 2024-11-05 RX ORDER — OXYCODONE HYDROCHLORIDE 5 MG/1
5 TABLET ORAL EVERY 6 HOURS PRN
Qty: 12 TABLET | Refills: 0 | Status: SHIPPED | OUTPATIENT
Start: 2024-11-05 | End: 2024-11-08

## 2024-11-05 RX ORDER — ACETAMINOPHEN 325 MG/1
325 TABLET ORAL EVERY 6 HOURS PRN
Qty: 56 TABLET | Refills: 0 | Status: SHIPPED | OUTPATIENT
Start: 2024-11-05

## 2024-11-05 RX ORDER — MISOPROSTOL 200 UG/1
800 TABLET ORAL ONCE
Qty: 4 TABLET | Refills: 1 | Status: SHIPPED | OUTPATIENT
Start: 2024-11-05 | End: 2024-11-05

## 2024-11-05 RX ADMIN — MIFEPRISTONE 200 MG: 200 TABLET ORAL at 09:23

## 2024-11-05 NOTE — PROGRESS NOTES
SUBJECTIVE:                                                   Durga Flannery is a 39 year old female who presents to clinic today for the following health issue(s):  No chief complaint on file.      Additional information: ***    HPI:  ***    Patient's last menstrual period was 2024 (approximate)..     Patient is sexually active, .  Using none for contraception.    reports that she has never smoked. She has never been exposed to tobacco smoke. She has never used smokeless tobacco.  STD testing offered?  Declined    Health maintenance updated:  yes    Today's PHQ-2 Score:       3/20/2023     1:28 PM   PHQ-2 (  [employer])   Q1: Little interest or pleasure in doing things 1    Q2: Feeling down, depressed or hopeless 1    PHQ-2 Score 2   Q1: Little interest or pleasure in doing things Several days   Q2: Feeling down, depressed or hopeless Several days   PHQ-2 Score 2       Patient-reported     Today's PHQ-9 Score:       2024     9:46 AM   PHQ-9 SCORE   PHQ-9 Total Score MyChart 3 (Minimal depression)     Today's DAYNE-7 Score:       2024     9:46 AM   DAYNE-7 SCORE   Total Score 2 (minimal anxiety)   Total Score 2       Problem list and histories reviewed & adjusted, as indicated.  Additional history: as documented.    Patient Active Problem List   Diagnosis    Panic attack    Generalized anxiety disorder    Morbid obesity (H)    History of thyroid nodule    History of sleeve gastrectomy    Current moderate episode of major depressive disorder, unspecified whether recurrent (H)    PCOS (polycystic ovarian syndrome)    Patellofemoral arthralgia of left knee    Acute strain of neck muscle, initial encounter    Ulnar neuritis, left    Ulnar neuritis, right     Past Surgical History:   Procedure Laterality Date    LAPAROSCOPIC GASTRIC SLEEVE  2015    [facility]    WISDOM TOOTH EXTRACTION        Social History     Tobacco Use    Smoking status: Never     Passive exposure: Never    Smokeless tobacco:  "Never   Substance Use Topics    Alcohol use: Yes     Comment: 3/ week      Problem (# of Occurrences) Relation (Name,Age of Onset)    Anxiety Disorder (1) Sister (Tiffanie Flannery)    Schizophrenia (1) Sister (Tiffanie Flannery)    Mental Illness (1) Sister (Tiffanie Flannery)    Asthma (1) Father (Delfin Flannery)    Depression (1) Sister (Tiffanie Flannery)    Diabetes (1) Father (Delfin Flannery):     Hypertension (1) Father (Delfin Flannery)    Cerebrovascular Disease (2) Maternal Grandmother (Tiffanie Schumacher), Maternal Grandfather (Luis Schumacher)    Thyroid Disease (1) Mother (Aparna Flannery)    Other Cancer (1) Mother (Aparna Flannery)    Uterine Cancer (1) Mother (Aparna Flannery)           Negative family history of: Breast Cancer, Ovarian Cancer, Colorectal Cancer              Current Outpatient Medications   Medication Sig Dispense Refill    lisinopril-hydrochlorothiazide (ZESTORETIC) 20-25 MG tablet Take 1 tablet by mouth daily 90 tablet 1    methocarbamol (ROBAXIN) 500 MG tablet Take 1 tablet (500 mg) by mouth 4 times daily as needed for muscle spasms. 30 tablet 0    methylPREDNISolone (MEDROL DOSEPAK) 4 MG tablet therapy pack Follow Package Directions (Patient not taking: Reported on 10/15/2024) 21 tablet 0    Multiple Vitamins-Minerals (HAIR SKIN AND NAILS FORMULA) TABS       propranolol (INDERAL) 10 MG tablet Take 1 tablet (10 mg) by mouth 3 times daily as needed (anxiety panic attack) 270 tablet 0     No current facility-administered medications for this visit.     Allergies   Allergen Reactions    Seasonal Allergies        ROS:  {[facility] ROSGYN:232868::\"12 point review of systems negative other than symptoms noted below or in the HPI.\"}  {ROS - :149685}      OBJECTIVE:     LMP 2024 (Approximate)   There is no height or weight on file to calculate BMI.    Exam:  {[facility] EXAM CHOICES:753194}     In-Clinic Test Results:  No results found for this or any previous visit (from the past 24 hours).    ASSESSMENT/PLAN:    "                                                   No diagnosis found.    There are no Patient Instructions on file for this visit.    ***    Sarah Rasheed PA-C  [facility]

## 2024-11-05 NOTE — PROGRESS NOTES
2024     LMP 24  Periods last 4-5 days. This last one was 4 days and a bit lighter than usual. Still needed a tampon, not so light she only needed panty liner.     Used Plan B after intercourse which was about 3 weeks ago.  U/s was indicated due to the use of plan B, but according to LMP would be too early.  I am having the patient do an HCG quant level.     Scheduled for visit on .         Pre-Medication  Assessment:     Durga Flannery     When was the first day of your last period? 24 Patient is sure of LMP date.     When was your positive pregnancy test? 10/28/24        Was the test more than 52 days ago (before 24)? No     Were you using hormonal birth control, an IUD or emergency contraception when you got pregnant? YES - ULTRASOUND NEEDED  Used plan B and it did not work     Have you ever been diagnosed with an ectopic pregnancy? No     Have you ever had a tubal ligation or sterilization procedure? No     Have you ever been diagnosed with pelvic inflammatory disease? No     Are you having one-sided pelvic pain? No     How long are your typical menstrual cycles /  How many days from the start of one period to the start of the next one?  Every 28 days      During the past 3 months, has the time between periods varied from your typical cycles by more than a few days? No, cycles have been regular.     Was LMP more than 10 weeks (70 days) ago (before 24)? No     Did your last period start earlier or later than you would have expected? No     Was your last period shorter than usual? YES - ULTRASOUND NEEDED     Was the amount of bleeding/cramping in your last period normal for you? Yes     Have you had any other recent bleeding that was not normal for you? No        Have you ever been diagnosed with:     An allergy or intolerance to mifepristone or misoprostol?  No     Chronic renal failure?  No     Hemorrhagic disorders? No     Inherited porphyria? No     Have you used systemic  corticosteroid therapy long term?  No     Are you currently on anticoagulation therapy (excluding aspirin)?  No     Based on the responses given by the patient, an ultrasound is indicated.        Patient denies all contraindications, will proceed with scheduling medication termination of pregnancy appointment.        Christina Harris RN         Subjective:  Durga Flannery is a 39 year old  at   5w2d weeks gestation here today for medication .  Patient has been counseled on pregnancy options and desires medical termination of pregnancy.       Objective:  Dating:  Patient's last menstrual period was 2024 (approximate).      OB History    Para Term  AB Living   1 0 0 0 0 0   SAB IAB Ectopic Multiple Live Births   0 0 0 0 0      # Outcome Date GA Lbr Alex/2nd Weight Sex Type Anes PTL Lv   1                  Past Medical History:   Diagnosis Date    Current moderate episode of major depressive disorder, unspecified whether recurrent (H) 3/20/2023    Hypertension        Past Surgical History:   Procedure Laterality Date    LAPAROSCOPIC GASTRIC SLEEVE  2015    [facility]    WISDOM TOOTH EXTRACTION         Current Outpatient Medications   Medication Sig Dispense Refill    acetaminophen (TYLENOL) 325 MG tablet Take 1 tablet (325 mg) by mouth every 6 hours as needed for pain. 2 Tablets every 6-8 hours as needed for pain 56 tablet 0    ibuprofen (ADVIL/MOTRIN) 800 MG tablet Take 1 tablet (800 mg) by mouth every 8 hours as needed for other (Cramping). Take with food 21 tablet 0    misoprostol (CYTOTEC) 200 MCG tablet Place 4 tablets (800 mcg) vaginally once for 1 dose. 4 tablet 1    ondansetron (ZOFRAN) 4 MG tablet Take 1 tablet (4 mg) by mouth every 6 hours as needed for nausea. 10 tablet 0    oxyCODONE (ROXICODONE) 5 MG tablet Take 1 tablet (5 mg) by mouth every 6 hours as needed for pain. 12 tablet 0    lisinopril-hydrochlorothiazide (ZESTORETIC) 20-25 MG tablet Take 1 tablet by mouth  "daily (Patient not taking: Reported on 2024) 90 tablet 1    methocarbamol (ROBAXIN) 500 MG tablet Take 1 tablet (500 mg) by mouth 4 times daily as needed for muscle spasms. (Patient not taking: Reported on 2024) 30 tablet 0    methylPREDNISolone (MEDROL DOSEPAK) 4 MG tablet therapy pack Follow Package Directions (Patient not taking: Reported on 2024) 21 tablet 0    Multiple Vitamins-Minerals (HAIR SKIN AND NAILS FORMULA) TABS  (Patient not taking: Reported on 2024)      propranolol (INDERAL) 10 MG tablet Take 1 tablet (10 mg) by mouth 3 times daily as needed (anxiety panic attack) 270 tablet 0     Current Facility-Administered Medications   Medication Dose Route Frequency Provider Last Rate Last Admin    miFEPRIStone (MIFEPREX) tablet 200 mg  200 mg Oral Once            Allergies   Allergen Reactions    Seasonal Allergies        Social History     Tobacco Use    Smoking status: Never     Passive exposure: Never    Smokeless tobacco: Never   Vaping Use    Vaping status: Never Used   Substance Use Topics    Alcohol use: Yes     Comment: 3/ week    Drug use: Never        Vitals:    24 0900   BP: (!) 130/90   BP Location: Right arm   Weight: 121 kg (266 lb 12.8 oz)       Estimated body mass index is 42.37 kg/m  as calculated from the following:    Height as of 24: 1.69 m (5' 6.54\").    Weight as of this encounter: 121 kg (266 lb 12.8 oz).     Gen: well-appearing, cooperative, well-groomed      Assessment and Plan:  Durga Flannery is a 39 year old  at 5w2d weeks gestation as dated by LMP who desires termination of pregnancy with medication . Plan B taken. Patient had HCG quants done instead of US dating since IUP would likely be too early to see on US.     Based the pre-medication  ultrasound assessment, ultrasound prior to  IS NOT indicated.     The patient is appropriate for medication  with:  mifepristone and misoprostol.     Prior to the " administration/prescribing of mifepristone, the patient received the medication guide and signed the patient agreement.      Mifepristone administered in clinic. Patient plans to take misoprostol by vaginal route.       Counseled patient on   - expected bleeding: Bleeding typically starts 2-4 hours after misoprostol and can be heavy for up to 2 hours. Once pregnancy tissue passes, bleeding should slow down to menstrual type flow but can last up to 2 weeks. Patient counseled to contact our clinic or present to Emergency Department in the case of heavy bleeding (soaking more than two maxi pads per hour for 2 consecutive hours). Also counseled to contact clinic if no bleeding within 24 hours after taking misoprostol.  - pain: Counseled patient that the most severe pain occurs approximately 2-4 hours after misoprostol use and lasts 1-2 hours. Advised patient to take ibuprofen 800 mg with misoprostol and repeat as needed every 8 hours. Patient may also alternate with acetaminophen for added pain control (acetaminophen 650 mg every 6 hours).   - potential side effects of misoprostol: nausea, vomiting, diarrhea, headache, dizziness, and thermoregulatory effects such as fever, warmth, hot flushes, or chills  - follow up plan options:   Home Pregnancy Test - Take a urine pregnancy test four weeks after taking  medication(s).  Blood Test - Get a blood test on the day of your appointment or the day you take your first dose of medication to measure the amount of pregnancy hormone (HCG) in your blood.  Get another HCG blood test 1-2 weeks after taking  medication(s).   Ultrasound - Get a vaginal ultrasound 1-2 weeks after taking  medication(s).  Patient plans two blood tests for follow up.  Also offered to discuss any contraceptive needs/plans with the patient today. Patient plans unsure.    Chart routed to RN team (TOP Triage [id number]) who will follow up with patient via telephone within 1 week after clinic  visit to assess patient.          Sarah Rasheed PA-C

## 2024-11-05 NOTE — PATIENT INSTRUCTIONS
MEDICATION TERMINATION OF PREGNANCY USING MIFEPRISTONE AND MISOPROSTOL    HOW DOES IT WORK?    Mifepristone and misoprostol together are medications that can be taken to end your pregnancy.      HOW WELL DOES THE MEDICATION WORK?    Medication  is highly effective. Medication  is highly effective; it has a success rate of >95% using the standard protocol. Medication  is safe. Serious complications requiring hospitalization for infection treatment or transfusion occur in <0.4% of patients under the standard protocol?. Failed or incomplete medication  may require a suction procedure to complete.    WHAT DO I DO?    You took one tablet of 200 mg mifepristone today during your visit or will take it at home as directed by your provider.   After taking the mifepristone, use the misoprostol.  There are two ways to take misoprostol: vaginal or buccal (between cheek and gum in your mouth).   Vaginal Use of Misoprostol (may be inserted immediately or up to 48 hrs after mifepristone)   Wash your hands and lie down. Place the 4 misoprostol tablets one at a time up into the vagina as far as you can using your finger. It doesn t matter where in the vagina you put the pills. Each misoprostol pill contains 200 micrograms.    If your provider has recommended a second dose of misoprostol, repeat the process 4 hours later with an additional 4 tablets of misoprostol. If you have started to bleed, you can put the pills in your cheeks (between your cheek and your gums), instead of your vagina, for 30 minutes. See  Buccal Use of Misoprostol  below.    Buccal Use of Misoprostol (should be taken between 24-48 hrs after mifepristone)   Place 2 tablets of misoprostol in each cheek (between your cheek and your gums), four tablets total.    Leave them in your cheeks for 30 minutes to dissolve.    After 30 minutes swallow the pills with a large glass of water.   If your provider has recommended a second dose of  misoprostol, repeat steps i. through iii. above after 4 hours.    You may take ibuprofen (800 mg total) and/or acetaminophen (650mg) by mouth one hour before using the misoprostol. This may help with cramping. See further information on pain management below.       WHAT HAPPENS NEXT?   Vaginal bleeding with clots is an expected part of this process. The cramps and bleeding may be stronger than your normal period. The cramps and heavy bleeding usually start 2 to 4 hours after you use the misoprostol pills. This heavy bleeding means the pills are working. After the pregnancy tissue passes, the bleeding will slow down and get lighter and lighter. It is normal to pass small clots and sometimes the bleeding may seem to increase when you get up suddenly or go to the toilet. Bleeding may continue on and off for up to 4 weeks.     Lower abdominal cramping pain, especially in the middle of your lower abdomen can occur any time after you take the misoprostol.? Cramping may be similar or sometimes much greater than with a menstrual period and can last for a couple of days. If you continue to have pain and cramps after taking the ibuprofen (as directed above), then you can use additional pain medicine such as acetaminophen (Tylenol).?   Nausea, diarrhea: These symptoms should get better a few hours after using the pills and should not last longer than 24 hours.    Fever and chills: You may have fever and chills the day you take the misoprostol. It is NOT normal to have a fever after that. Call us right away if you do. It could be a sign that you are getting an infection.   You will receive a phone call from a clinic nurse within a week of your visit.    You can expect a period in 4 to 8 weeks after using mifepristone and misoprostol but this varies quite a bit depending upon a person s normal menstrual cycle.      WHAT CAN I TAKE FOR PAIN, NAUSEA, DIARRHEA?    Pain:    Take ibuprofen (Motrin or Advil) 800 mg every 8 hours as  needed for pain. Take this with food to avoid an upset stomach. You may also alternate this with acetaminophen (Tylenol) 650mg every 6 hours for added pain control.   Comfort: A hot pack may help with cramps. You can also drink some hot tea. Rest in a soothing place.    Nausea   Take ondansetron or promethazine as needed for nausea and vomiting as needed for nausea and vomiting if prescribed.   Diarrhea   Take Loperamide as needed for diarrhea. Take 2 capsules after the first loose bowel movement. Can take 1 capsule after each additional loose stool. Do not take more than 8 capsules in a day.     WHEN SHOULD I CALL MY HEALTHCARE PROVIDER?    Your bleeding soaks through more than 2 maxi pads per hour for 2 hours in a row   You do not bleed within 24 hours after taking the misoprostol   You continue to feel sick more than 24 hours after taking the misoprostol:   Fever on an oral thermometer of 100.4 degrees Fahrenheit, severe stomach area (abdominal) pain, weakness, nausea, vomiting, or diarrhea     The clinic phone is answered 24 hours per day. If it is after clinic hours, leave a message with the answering service and a health care provider will call you back. Please call if you have any questions, think something is going wrong, or think you have an emergency. If you do not receive a call back within an hour, go to the nearest emergency room.              FEELINGS AFTER ENDING PREGNANCY    People have many different feelings after using the medications to end a pregnancy. The most common emotion people report is relief, but people can also feel many other emotions. If you think your emotions are not what they should be, please talk to us.        References:   American College of Obstetricians and Gynecologists  Committee on Practice Bulletins--Gynecology, Society of Family Planning. Medication  Up to 70 Days of Gestation: ACOG Practice Bulletin, Number 225. Obstet Gynecol. 2020;136(4):e31-e47. doi:  10.1097/AOG.[id number].    Reproductive Health Access Project. Medication  Aftercare Instructions (vaginal miso). May 2022. Available at: https://www.reproductiveaccess.org/wp-content/uploads/[email or web address]-Mxyeiuqky_DagdebtMzaeKjr-final.pdf   Reproductive Health Access Project. Medication  Aftercare Instructions (buccal miso). May 2022. Available at: https://www.reproductiveaccess.org/wp-content/uploads/-english-buccal-med-ab-aftercare_final.pdf

## 2024-11-05 NOTE — TELEPHONE ENCOUNTER
Pt had a TOP today, 11/5/24, with Sarah Rasheed PA-C.    Routing to [facility] [place] to call pt in a couple of days for a follow up call.    Elizabeth Brewer RN-[facility]

## 2024-11-06 ENCOUNTER — MYC MEDICAL ADVICE (OUTPATIENT)
Dept: OBGYN | Facility: CLINIC | Age: 39
End: 2024-11-06
Payer: COMMERCIAL

## 2024-11-07 RX ORDER — MISOPROSTOL 200 UG/1
200 TABLET ORAL ONCE
Qty: 4 TABLET | Refills: 0 | Status: SHIPPED | OUTPATIENT
Start: 2024-11-07 | End: 2024-11-07

## 2024-11-07 NOTE — TELEPHONE ENCOUNTER
I would have patient repeat the misoprostol. I will send in for the buccal this time.    Two tabs in each cheek. Let sit 30minutes then swallow down the rest.     Have patient call in 1-2 days to check in     I sent RX to [facility]    Thank you    Sarah Rasheed PA-C

## 2024-11-07 NOTE — TELEPHONE ENCOUNTER
TOP 11/5/24 w M Wili  NO ULTRASOUND done  HCG 1186    Mife in clinic at 0900  Misoprostol at 1100 the next day  Has had some mild cramping but no significant bleeding - some spotting  Today feels fine  Yesterday had some mild chills but no fever, aches/chills today.    Routing to  [facility] - next steps? No results from mife/miso as of today.    Zina Nix RN on 11/7/2024 at 8:26 AM

## 2024-11-07 NOTE — TELEPHONE ENCOUNTER
RN called pt and still no bleeding.    Instructed pt on the next steps to repeat the misoprostol buccal this time and explained how to do this. Instructed on dosing and rx sent    Pt will update us tomorrow afternoon on results and reviewed w pt when to seek care in ER.    Pt verbalized understanding, in agreement with plan, and voiced no further questions.    Zina Nix RN on 11/7/2024 at 12:37 PM    RN to call pt Friday afternoon to check in as well.

## 2024-11-11 NOTE — TELEPHONE ENCOUNTER
Medication Termination of Pregnancy Follow Up Assessment    Durga Flannery is 39 year old who had a medication .   Second round of Misoprostol on   Had a little bit of bleeding 6-7 hours after meds  24 hours later had heavier period like bleeding  Was passing clots  Did have some cramping    Bleeding today is very light   No cramping    Infection: Patient denies signs/symptoms of infection, including fever.    Coping:  Patient reports coping OK  Patient reports feeling supported at home.  Resources for coping / grief reinforced.    Follow Up:   Patient is doing two blood tests for follow up.  Results expected: 24  Results will be sent to provider to finalize follow-up plan.  Patient to expect call back with results / plan.    Patient encouraged to call triage as needed.     Expressed it sounds that she bled as expected.  Wanted to set up appt with OBGYN to discuss birth control.  Scheduled with preferred location/provider    Advise she likely can can do repeat lab on  at visit.    Letty Lord RN

## 2024-11-12 ENCOUNTER — OFFICE VISIT (OUTPATIENT)
Dept: URGENT CARE | Facility: URGENT CARE | Age: 39
End: 2024-11-12
Payer: COMMERCIAL

## 2024-11-12 VITALS
HEART RATE: 68 BPM | DIASTOLIC BLOOD PRESSURE: 88 MMHG | BODY MASS INDEX: 41.29 KG/M2 | RESPIRATION RATE: 16 BRPM | TEMPERATURE: 98.3 F | SYSTOLIC BLOOD PRESSURE: 137 MMHG | WEIGHT: 260 LBS | OXYGEN SATURATION: 100 %

## 2024-11-12 DIAGNOSIS — R31.9 URINARY TRACT INFECTION WITH HEMATURIA, SITE UNSPECIFIED: ICD-10-CM

## 2024-11-12 DIAGNOSIS — N39.0 URINARY TRACT INFECTION WITH HEMATURIA, SITE UNSPECIFIED: ICD-10-CM

## 2024-11-12 DIAGNOSIS — R30.0 DYSURIA: Primary | ICD-10-CM

## 2024-11-12 LAB
ALBUMIN UR-MCNC: NEGATIVE MG/DL
APPEARANCE UR: CLEAR
BACTERIA #/AREA URNS HPF: ABNORMAL /HPF
BILIRUB UR QL STRIP: NEGATIVE
CLUE CELLS: ABNORMAL
COLOR UR AUTO: YELLOW
GLUCOSE UR STRIP-MCNC: NEGATIVE MG/DL
HGB UR QL STRIP: ABNORMAL
KETONES UR STRIP-MCNC: NEGATIVE MG/DL
LEUKOCYTE ESTERASE UR QL STRIP: ABNORMAL
NITRATE UR QL: NEGATIVE
PH UR STRIP: 7 [PH] (ref 5–7)
RBC #/AREA URNS AUTO: ABNORMAL /HPF
SP GR UR STRIP: 1.01 (ref 1–1.03)
SQUAMOUS #/AREA URNS AUTO: ABNORMAL /LPF
TRICHOMONAS, WET PREP: ABNORMAL
UROBILINOGEN UR STRIP-ACNC: 0.2 E.U./DL
WBC #/AREA URNS AUTO: ABNORMAL /HPF
WBC CLUMPS #/AREA URNS HPF: PRESENT /HPF
WBC'S/HIGH POWER FIELD, WET PREP: ABNORMAL
YEAST, WET PREP: ABNORMAL

## 2024-11-12 PROCEDURE — 99213 OFFICE O/P EST LOW 20 MIN: CPT | Performed by: FAMILY MEDICINE

## 2024-11-12 PROCEDURE — 87086 URINE CULTURE/COLONY COUNT: CPT | Performed by: FAMILY MEDICINE

## 2024-11-12 PROCEDURE — 81001 URINALYSIS AUTO W/SCOPE: CPT | Performed by: FAMILY MEDICINE

## 2024-11-12 PROCEDURE — 87088 URINE BACTERIA CULTURE: CPT | Performed by: FAMILY MEDICINE

## 2024-11-12 PROCEDURE — 87210 SMEAR WET MOUNT SALINE/INK: CPT | Performed by: FAMILY MEDICINE

## 2024-11-12 RX ORDER — CEPHALEXIN 500 MG/1
500 CAPSULE ORAL 2 TIMES DAILY
Qty: 10 CAPSULE | Refills: 0 | Status: SHIPPED | OUTPATIENT
Start: 2024-11-12 | End: 2024-11-17

## 2024-11-12 NOTE — PROGRESS NOTES
Chief Complaint   Patient presents with    UTI     Sx Started Sunday   Low Back Pain   Urgency    Vaginal Discharge     Taking Medication For Pregnancy Termination on 11/5 and 11/7     Durga was seen today for uti and vaginal discharge.    Diagnoses and all orders for this visit:    Dysuria  -     UA Macroscopic with reflex to Microscopic and Culture - Clinic Collect  -     Wet prep - Clinic Collect  -     Urine Microscopic Exam  -     Urine Culture    Urinary tract infection with hematuria, site unspecified  -     cephALEXin (KEFLEX) 500 MG capsule; Take 1 capsule (500 mg) by mouth 2 times daily for 5 days.      ASSESSMENT: UTI uncomplicated without evidence of pyelonephritis    PLAN: Treatment per orders - also push fluids, may use Pyridium OTC prn. Call or return to clinic prn if these symptoms worsen or fail to improve as anticipated.  Reviewed results with patient discussed to complete the course of the antibiotic follow-up if symptoms do not get better    SUBJECTIVE: Durga Flannery is a 39 year old female recently had a miscarriage 7 days back who complains of urinary frequency, urgency and dysuria x 2 days, without flank pain, fever, chills  She does have some abnormal vaginal discharge       OBJECTIVE: Appears well, in no apparent distress.  Vital signs are normal. The abdomen is soft without tenderness, guarding, mass, rebound or organomegaly. No CVA tenderness or inguinal adenopathy noted. Urine   Results for orders placed or performed in visit on 11/12/24   UA Macroscopic with reflex to Microscopic and Culture - Clinic Collect     Status: Abnormal    Specimen: Urine, Clean Catch   Result Value Ref Range    Color Urine Yellow Colorless, Straw, Light Yellow, Yellow    Appearance Urine Clear Clear    Glucose Urine Negative Negative mg/dL    Bilirubin Urine Negative Negative    Ketones Urine Negative Negative mg/dL    Specific Gravity Urine 1.010 1.003 - 1.035    Blood Urine Moderate (A) Negative    pH  Urine 7.0 5.0 - 7.0    Protein Albumin Urine Negative Negative mg/dL    Urobilinogen Urine 0.2 0.2, 1.0 E.U./dL    Nitrite Urine Negative Negative    Leukocyte Esterase Urine Moderate (A) Negative   Urine Microscopic Exam     Status: Abnormal   Result Value Ref Range    Bacteria Urine Moderate (A) None Seen /HPF    RBC Urine 0-2 0-2 /HPF /HPF    WBC Urine  (A) 0-5 /HPF /HPF    Squamous Epithelials Urine Few (A) None Seen /LPF    WBC Clumps Urine Present (A) None Seen /HPF   Wet prep - Clinic Collect     Status: Abnormal    Specimen: Vagina; Swab   Result Value Ref Range    Trichomonas Absent Absent    Yeast Absent Absent    Clue Cells Absent Absent    WBCs/high power field 1+ (A) None       Dania Flores MD

## 2024-11-13 DIAGNOSIS — N30.00 ACUTE CYSTITIS WITHOUT HEMATURIA: Primary | ICD-10-CM

## 2024-11-13 LAB — BACTERIA UR CULT: ABNORMAL

## 2024-11-13 RX ORDER — SULFAMETHOXAZOLE AND TRIMETHOPRIM 800; 160 MG/1; MG/1
1 TABLET ORAL 2 TIMES DAILY
Qty: 10 TABLET | Refills: 0 | Status: SHIPPED | OUTPATIENT
Start: 2024-11-13 | End: 2024-11-18

## 2024-11-19 ENCOUNTER — LAB (OUTPATIENT)
Dept: LAB | Facility: CLINIC | Age: 39
End: 2024-11-19
Payer: COMMERCIAL

## 2024-11-19 DIAGNOSIS — Z33.2 TERMINATION OF PREGNANCY (FETUS): ICD-10-CM

## 2024-11-19 PROCEDURE — 84702 CHORIONIC GONADOTROPIN TEST: CPT

## 2024-11-19 PROCEDURE — 36415 COLL VENOUS BLD VENIPUNCTURE: CPT

## 2024-11-20 LAB — HCG INTACT+B SERPL-ACNC: 539 MIU/ML

## 2024-11-21 ENCOUNTER — VIRTUAL VISIT (OUTPATIENT)
Dept: OBGYN | Facility: CLINIC | Age: 39
End: 2024-11-21
Payer: COMMERCIAL

## 2024-11-21 DIAGNOSIS — Z33.2 MEDICAL ABORTION: Primary | ICD-10-CM

## 2024-11-21 DIAGNOSIS — Z11.3 ROUTINE SCREENING FOR STI (SEXUALLY TRANSMITTED INFECTION): ICD-10-CM

## 2024-11-21 DIAGNOSIS — Z30.09 GENERAL COUNSELING FOR PRESCRIPTION OF ORAL CONTRACEPTIVES: ICD-10-CM

## 2024-11-21 RX ORDER — ACETAMINOPHEN AND CODEINE PHOSPHATE 120; 12 MG/5ML; MG/5ML
0.35 SOLUTION ORAL DAILY
Qty: 84 TABLET | Refills: 4 | Status: SHIPPED | OUTPATIENT
Start: 2024-11-21

## 2024-11-21 NOTE — PROGRESS NOTES
Virtual Visit Details  Type of service:  Video Visit   Originating Location (pt. Location): Home  Distant Location (provider location):  On-site  Platform used for Video Visit: Марина    Joined the call at 2024, 10:55:27 am.  Left the call at 2024, 11:08:31 am.  You were on the call for 13 minutes 4 seconds .      Subjective:   Durga Flannery is a 39 year old  who recently had a medication  and wants to discuss contraception.     Tried ortho tricyclen in the past  Has htn is on antihypertensive medication now (lisinopril)  No personal h/o VTE or PE  nonsmoker    In regard to her medication  she took mifepristone on   Took misoprostol . No bleeding.   Second round of Misoprostol on   Had a little bit of bleeding 6-7 hours after meds  Bleeding was lighter than a regular period. Small clots. Bleeding was lighter than expected.  Still having some brown spotting,mucus discharge.       Latest Reference Range & Units 10/31/24 13:12 24 09:17 24 13:11   hCG Quantitative <5 mIU/mL 438 (H) 1,186 (H) 539 (H)   (H): Data is abnormally high     Bleeding today is very light   No cramping    Social History     Tobacco Use    Smoking status: Never     Passive exposure: Never    Smokeless tobacco: Never   Vaping Use    Vaping status: Never Used   Substance Use Topics    Alcohol use: Yes     Comment: 3/ week    Drug use: Never          Objective:   Alert, oriented, no distress      Assessment:  39 year old  with recent medication  who desires contraception.  Beta hCG did not drop as much as expected after MAB.   Pt has hypertension so contraception that contains estrogen is contraindicated    Plan:  1. Medical  (Primary)  Recommend patient repeat beta hCG and get an ultrasound within 1 week to confirm if medication  is complete  She did not have an US prior to med AB so may not see much but eval for retained POC or any evidence ectopic  - hCG  Quantitative Pregnancy; Future  - US Transvaginal Pelvic Non-OB; Future    2. General counseling for prescription of oral contraceptives  - norethindrone (MICRONOR) 0.35 MG tablet; Take 1 tablet (0.35 mg) by mouth daily.  Dispense: 84 tablet; Refill: 4    3. Routine screening for STI (sexually transmitted infection)  Patient will have STI screening done when she comes to lab for hCG level  - Chlamydia trachomatis/Neisseria gonorrhoeae by PCR - Clinic Collect; Future  - HIV Antigen Antibody Combo; Future  - Treponema Abs w Reflex to RPR and Titer; Future      Syeda العلي MD

## 2024-11-22 ENCOUNTER — LAB (OUTPATIENT)
Dept: LAB | Facility: CLINIC | Age: 39
End: 2024-11-22
Payer: COMMERCIAL

## 2024-11-22 ENCOUNTER — ANCILLARY PROCEDURE (OUTPATIENT)
Dept: ULTRASOUND IMAGING | Facility: CLINIC | Age: 39
End: 2024-11-22
Attending: OBSTETRICS & GYNECOLOGY
Payer: COMMERCIAL

## 2024-11-22 DIAGNOSIS — Z33.2 MEDICAL ABORTION: ICD-10-CM

## 2024-11-22 DIAGNOSIS — Z11.3 ROUTINE SCREENING FOR STI (SEXUALLY TRANSMITTED INFECTION): ICD-10-CM

## 2024-11-22 LAB — T PALLIDUM AB SER QL: NONREACTIVE

## 2024-11-22 PROCEDURE — 87389 HIV-1 AG W/HIV-1&-2 AB AG IA: CPT

## 2024-11-22 PROCEDURE — 84702 CHORIONIC GONADOTROPIN TEST: CPT

## 2024-11-22 PROCEDURE — 87591 N.GONORRHOEAE DNA AMP PROB: CPT

## 2024-11-22 PROCEDURE — 86780 TREPONEMA PALLIDUM: CPT

## 2024-11-22 PROCEDURE — 76817 TRANSVAGINAL US OBSTETRIC: CPT | Performed by: STUDENT IN AN ORGANIZED HEALTH CARE EDUCATION/TRAINING PROGRAM

## 2024-11-22 PROCEDURE — 87491 CHLMYD TRACH DNA AMP PROBE: CPT

## 2024-11-22 PROCEDURE — 36415 COLL VENOUS BLD VENIPUNCTURE: CPT

## 2024-11-23 LAB
C TRACH DNA SPEC QL PROBE+SIG AMP: NEGATIVE
HCG INTACT+B SERPL-ACNC: 351 MIU/ML
HIV 1+2 AB+HIV1 P24 AG SERPL QL IA: NONREACTIVE
N GONORRHOEA DNA SPEC QL NAA+PROBE: NEGATIVE

## 2024-11-24 DIAGNOSIS — I10 BENIGN ESSENTIAL HYPERTENSION: ICD-10-CM

## 2024-11-25 ENCOUNTER — MYC REFILL (OUTPATIENT)
Dept: FAMILY MEDICINE | Facility: CLINIC | Age: 39
End: 2024-11-25
Payer: COMMERCIAL

## 2024-11-25 DIAGNOSIS — I10 BENIGN ESSENTIAL HYPERTENSION: ICD-10-CM

## 2024-11-25 DIAGNOSIS — Z33.2 MEDICAL ABORTION: Primary | ICD-10-CM

## 2024-11-25 RX ORDER — LISINOPRIL AND HYDROCHLOROTHIAZIDE 20; 25 MG/1; MG/1
1 TABLET ORAL DAILY
Qty: 90 TABLET | Refills: 0 | Status: SHIPPED | OUTPATIENT
Start: 2024-11-25

## 2024-11-25 RX ORDER — LISINOPRIL AND HYDROCHLOROTHIAZIDE 20; 25 MG/1; MG/1
1 TABLET ORAL DAILY
Qty: 90 TABLET | Refills: 1 | Status: SHIPPED | OUTPATIENT
Start: 2024-11-25 | End: 2024-11-25

## 2025-02-18 DIAGNOSIS — I10 BENIGN ESSENTIAL HYPERTENSION: ICD-10-CM

## 2025-02-18 RX ORDER — LISINOPRIL AND HYDROCHLOROTHIAZIDE 20; 25 MG/1; MG/1
1 TABLET ORAL DAILY
Qty: 90 TABLET | Refills: 0 | Status: SHIPPED | OUTPATIENT
Start: 2025-02-18

## 2025-04-08 ENCOUNTER — OFFICE VISIT (OUTPATIENT)
Dept: FAMILY MEDICINE | Facility: CLINIC | Age: 40
End: 2025-04-08
Attending: PHYSICIAN ASSISTANT
Payer: COMMERCIAL

## 2025-04-08 VITALS
WEIGHT: 257 LBS | TEMPERATURE: 96.9 F | HEART RATE: 61 BPM | OXYGEN SATURATION: 100 % | RESPIRATION RATE: 16 BRPM | HEIGHT: 66 IN | DIASTOLIC BLOOD PRESSURE: 87 MMHG | SYSTOLIC BLOOD PRESSURE: 129 MMHG | BODY MASS INDEX: 41.3 KG/M2

## 2025-04-08 DIAGNOSIS — L81.1 MELASMA: ICD-10-CM

## 2025-04-08 DIAGNOSIS — Z11.3 SCREEN FOR STD (SEXUALLY TRANSMITTED DISEASE): ICD-10-CM

## 2025-04-08 DIAGNOSIS — Z00.00 ROUTINE GENERAL MEDICAL EXAMINATION AT A HEALTH CARE FACILITY: Primary | ICD-10-CM

## 2025-04-08 DIAGNOSIS — F32.1 CURRENT MODERATE EPISODE OF MAJOR DEPRESSIVE DISORDER, UNSPECIFIED WHETHER RECURRENT (H): ICD-10-CM

## 2025-04-08 DIAGNOSIS — Z30.09 GENERAL COUNSELING FOR PRESCRIPTION OF ORAL CONTRACEPTIVES: ICD-10-CM

## 2025-04-08 DIAGNOSIS — I10 BENIGN ESSENTIAL HYPERTENSION: ICD-10-CM

## 2025-04-08 DIAGNOSIS — Z13.6 CARDIOVASCULAR SCREENING; LDL GOAL LESS THAN 160: ICD-10-CM

## 2025-04-08 LAB
ALBUMIN SERPL BCG-MCNC: 4.1 G/DL (ref 3.5–5.2)
ALP SERPL-CCNC: 78 U/L (ref 40–150)
ALT SERPL W P-5'-P-CCNC: 14 U/L (ref 0–50)
ANION GAP SERPL CALCULATED.3IONS-SCNC: 10 MMOL/L (ref 7–15)
AST SERPL W P-5'-P-CCNC: 21 U/L (ref 0–45)
BILIRUB SERPL-MCNC: 0.4 MG/DL
BUN SERPL-MCNC: 11.2 MG/DL (ref 6–20)
C TRACH DNA SPEC QL PROBE+SIG AMP: NEGATIVE
CALCIUM SERPL-MCNC: 9.4 MG/DL (ref 8.8–10.4)
CHLORIDE SERPL-SCNC: 103 MMOL/L (ref 98–107)
CHOLEST SERPL-MCNC: 139 MG/DL
CREAT SERPL-MCNC: 0.89 MG/DL (ref 0.51–0.95)
CREAT UR-MCNC: 14.7 MG/DL
EGFRCR SERPLBLD CKD-EPI 2021: 83 ML/MIN/1.73M2
EST. AVERAGE GLUCOSE BLD GHB EST-MCNC: 103 MG/DL
FASTING STATUS PATIENT QL REPORTED: YES
FASTING STATUS PATIENT QL REPORTED: YES
GLUCOSE SERPL-MCNC: 91 MG/DL (ref 70–99)
HBA1C MFR BLD: 5.2 % (ref 0–5.6)
HCO3 SERPL-SCNC: 25 MMOL/L (ref 22–29)
HDLC SERPL-MCNC: 32 MG/DL
HGB BLD-MCNC: 13.8 G/DL (ref 11.7–15.7)
HIV 1+2 AB+HIV1 P24 AG SERPL QL IA: NONREACTIVE
LDLC SERPL CALC-MCNC: 100 MG/DL
MICROALBUMIN UR-MCNC: <12 MG/L
MICROALBUMIN/CREAT UR: NORMAL MG/G{CREAT}
N GONORRHOEA DNA SPEC QL NAA+PROBE: NEGATIVE
NONHDLC SERPL-MCNC: 107 MG/DL
POTASSIUM SERPL-SCNC: 4.4 MMOL/L (ref 3.4–5.3)
PROT SERPL-MCNC: 7 G/DL (ref 6.4–8.3)
SODIUM SERPL-SCNC: 138 MMOL/L (ref 135–145)
SPECIMEN TYPE: NORMAL
TRIGL SERPL-MCNC: 36 MG/DL

## 2025-04-08 PROCEDURE — 36415 COLL VENOUS BLD VENIPUNCTURE: CPT | Performed by: PHYSICIAN ASSISTANT

## 2025-04-08 PROCEDURE — 85018 HEMOGLOBIN: CPT | Performed by: PHYSICIAN ASSISTANT

## 2025-04-08 PROCEDURE — 3079F DIAST BP 80-89 MM HG: CPT | Performed by: PHYSICIAN ASSISTANT

## 2025-04-08 PROCEDURE — 87389 HIV-1 AG W/HIV-1&-2 AB AG IA: CPT | Performed by: PHYSICIAN ASSISTANT

## 2025-04-08 PROCEDURE — 3074F SYST BP LT 130 MM HG: CPT | Performed by: PHYSICIAN ASSISTANT

## 2025-04-08 PROCEDURE — 87591 N.GONORRHOEAE DNA AMP PROB: CPT | Performed by: PHYSICIAN ASSISTANT

## 2025-04-08 PROCEDURE — 82570 ASSAY OF URINE CREATININE: CPT | Performed by: PHYSICIAN ASSISTANT

## 2025-04-08 PROCEDURE — 80053 COMPREHEN METABOLIC PANEL: CPT | Performed by: PHYSICIAN ASSISTANT

## 2025-04-08 PROCEDURE — 82043 UR ALBUMIN QUANTITATIVE: CPT | Performed by: PHYSICIAN ASSISTANT

## 2025-04-08 PROCEDURE — 86780 TREPONEMA PALLIDUM: CPT | Performed by: PHYSICIAN ASSISTANT

## 2025-04-08 PROCEDURE — 1126F AMNT PAIN NOTED NONE PRSNT: CPT | Performed by: PHYSICIAN ASSISTANT

## 2025-04-08 PROCEDURE — 99396 PREV VISIT EST AGE 40-64: CPT | Performed by: PHYSICIAN ASSISTANT

## 2025-04-08 PROCEDURE — 80061 LIPID PANEL: CPT | Performed by: PHYSICIAN ASSISTANT

## 2025-04-08 PROCEDURE — 83036 HEMOGLOBIN GLYCOSYLATED A1C: CPT | Performed by: PHYSICIAN ASSISTANT

## 2025-04-08 PROCEDURE — 87491 CHLMYD TRACH DNA AMP PROBE: CPT | Performed by: PHYSICIAN ASSISTANT

## 2025-04-08 RX ORDER — ACETAMINOPHEN AND CODEINE PHOSPHATE 120; 12 MG/5ML; MG/5ML
0.35 SOLUTION ORAL DAILY
Qty: 84 TABLET | Refills: 4 | Status: SHIPPED | OUTPATIENT
Start: 2025-04-08

## 2025-04-08 RX ORDER — LISINOPRIL AND HYDROCHLOROTHIAZIDE 20; 25 MG/1; MG/1
1 TABLET ORAL DAILY
Qty: 90 TABLET | Refills: 1 | Status: SHIPPED | OUTPATIENT
Start: 2025-04-08

## 2025-04-08 SDOH — HEALTH STABILITY: PHYSICAL HEALTH: ON AVERAGE, HOW MANY DAYS PER WEEK DO YOU ENGAGE IN MODERATE TO STRENUOUS EXERCISE (LIKE A BRISK WALK)?: 3 DAYS

## 2025-04-08 SDOH — HEALTH STABILITY: PHYSICAL HEALTH: ON AVERAGE, HOW MANY MINUTES DO YOU ENGAGE IN EXERCISE AT THIS LEVEL?: 40 MIN

## 2025-04-08 ASSESSMENT — ANXIETY QUESTIONNAIRES
2. NOT BEING ABLE TO STOP OR CONTROL WORRYING: SEVERAL DAYS
GAD7 TOTAL SCORE: 4
GAD7 TOTAL SCORE: 4
IF YOU CHECKED OFF ANY PROBLEMS ON THIS QUESTIONNAIRE, HOW DIFFICULT HAVE THESE PROBLEMS MADE IT FOR YOU TO DO YOUR WORK, TAKE CARE OF THINGS AT HOME, OR GET ALONG WITH OTHER PEOPLE: NOT DIFFICULT AT ALL
5. BEING SO RESTLESS THAT IT IS HARD TO SIT STILL: NOT AT ALL
7. FEELING AFRAID AS IF SOMETHING AWFUL MIGHT HAPPEN: SEVERAL DAYS
4. TROUBLE RELAXING: NOT AT ALL
6. BECOMING EASILY ANNOYED OR IRRITABLE: NOT AT ALL
7. FEELING AFRAID AS IF SOMETHING AWFUL MIGHT HAPPEN: SEVERAL DAYS
GAD7 TOTAL SCORE: 4
3. WORRYING TOO MUCH ABOUT DIFFERENT THINGS: SEVERAL DAYS
1. FEELING NERVOUS, ANXIOUS, OR ON EDGE: SEVERAL DAYS
8. IF YOU CHECKED OFF ANY PROBLEMS, HOW DIFFICULT HAVE THESE MADE IT FOR YOU TO DO YOUR WORK, TAKE CARE OF THINGS AT HOME, OR GET ALONG WITH OTHER PEOPLE?: NOT DIFFICULT AT ALL

## 2025-04-08 ASSESSMENT — PAIN SCALES - GENERAL: PAINLEVEL_OUTOF10: NO PAIN (0)

## 2025-04-08 ASSESSMENT — PATIENT HEALTH QUESTIONNAIRE - PHQ9
SUM OF ALL RESPONSES TO PHQ QUESTIONS 1-9: 5
10. IF YOU CHECKED OFF ANY PROBLEMS, HOW DIFFICULT HAVE THESE PROBLEMS MADE IT FOR YOU TO DO YOUR WORK, TAKE CARE OF THINGS AT HOME, OR GET ALONG WITH OTHER PEOPLE: SOMEWHAT DIFFICULT
SUM OF ALL RESPONSES TO PHQ QUESTIONS 1-9: 5

## 2025-04-08 ASSESSMENT — SOCIAL DETERMINANTS OF HEALTH (SDOH): HOW OFTEN DO YOU GET TOGETHER WITH FRIENDS OR RELATIVES?: ONCE A WEEK

## 2025-04-08 NOTE — PATIENT INSTRUCTIONS
Patient Education   Preventive Care Advice   This is general advice given by our system to help you stay healthy. However, your care team may have specific advice just for you. Please talk to your care team about your preventive care needs.  Nutrition  Eat 5 or more servings of fruits and vegetables each day.  Try wheat bread, brown rice and whole grain pasta (instead of white bread, rice, and pasta).  Get enough calcium and vitamin D. Check the label on foods and aim for 100% of the RDA (recommended daily allowance).  Lifestyle  Exercise at least 150 minutes each week  (30 minutes a day, 5 days a week).  Do muscle strengthening activities 2 days a week. These help control your weight and prevent disease.  No smoking.  Wear sunscreen to prevent skin cancer.  Have a dental exam and cleaning every 6 months.  Yearly exams  See your health care team every year to talk about:  Any changes in your health.  Any medicines your care team has prescribed.  Preventive care, family planning, and ways to prevent chronic diseases.  Shots (vaccines)   HPV shots (up to age 26), if you've never had them before.  Hepatitis B shots (up to age 59), if you've never had them before.  COVID-19 shot: Get this shot when it's due.  Flu shot: Get a flu shot every year.  Tetanus shot: Get a tetanus shot every 10 years.  Pneumococcal, hepatitis A, and RSV shots: Ask your care team if you need these based on your risk.  Shingles shot (for age 50 and up)  General health tests  Diabetes screening:  Starting at age 35, Get screened for diabetes at least every 3 years.  If you are younger than age 35, ask your care team if you should be screened for diabetes.  Cholesterol test: At age 39, start having a cholesterol test every 5 years, or more often if advised.  Bone density scan (DEXA): At age 50, ask your care team if you should have this scan for osteoporosis (brittle bones).  Hepatitis C: Get tested at least once in your life.  STIs (sexually  transmitted infections)  Before age 24: Ask your care team if you should be screened for STIs.  After age 24: Get screened for STIs if you're at risk. You are at risk for STIs (including HIV) if:  You are sexually active with more than one person.  You don't use condoms every time.  You or a partner was diagnosed with a sexually transmitted infection.  If you are at risk for HIV, ask about PrEP medicine to prevent HIV.  Get tested for HIV at least once in your life, whether you are at risk for HIV or not.  Cancer screening tests  Cervical cancer screening: If you have a cervix, begin getting regular cervical cancer screening tests starting at age 21.  Breast cancer scan (mammogram): If you've ever had breasts, begin having regular mammograms starting at age 40. This is a scan to check for breast cancer.  Colon cancer screening: It is important to start screening for colon cancer at age 45.  Have a colonoscopy test every 10 years (or more often if you're at risk) Or, ask your provider about stool tests like a FIT test every year or Cologuard test every 3 years.  To learn more about your testing options, visit:   .  For help making a decision, visit:   https://bit.ly/hr37950.  Prostate cancer screening test: If you have a prostate, ask your care team if a prostate cancer screening test (PSA) at age 55 is right for you.  Lung cancer screening: If you are a current or former smoker ages 50 to 80, ask your care team if ongoing lung cancer screenings are right for you.  For informational purposes only. Not to replace the advice of your health care provider. Copyright   2023 [facility]. All rights reserved. Clinically reviewed by the [facility]. [employer] [id number] - REV 01/24.  Learning About Stress  What is stress?     Stress is your body's response to a hard situation. Your body can have a physical, emotional, or mental response. Stress is a fact of life for most people, and it  affects everyone differently. What causes stress for you may not be stressful for someone else.  A lot of things can cause stress. You may feel stress when you go on a job interview, take a test, or run a race. This kind of short-term stress is normal and even useful. It can help you if you need to work hard or react quickly. For example, stress can help you finish an important job on time.  Long-term stress is caused by ongoing stressful situations or events. Examples of long-term stress include long-term health problems, ongoing problems at work, or conflicts in your family. Long-term stress can harm your health.  How does stress affect your health?  When you are stressed, your body responds as though you are in danger. It makes hormones that speed up your heart, make you breathe faster, and give you a burst of energy. This is called the fight-or-flight stress response. If the stress is over quickly, your body goes back to normal and no harm is done.  But if stress happens too often or lasts too long, it can have bad effects. Long-term stress can make you more likely to get sick, and it can make symptoms of some diseases worse. If you tense up when you are stressed, you may develop neck, shoulder, or low back pain. Stress is linked to high blood pressure and heart disease.  Stress also harms your emotional health. It can make you aceves, tense, or depressed. Your relationships may suffer, and you may not do well at work or school.  What can you do to manage stress?  You can try these things to help manage stress:   Do something active. Exercise or activity can help reduce stress. Walking is a great way to get started. Even everyday activities such as housecleaning or yard work can help.  Try yoga or daniel chi. These techniques combine exercise and meditation. You may need some training at first to learn them.  Do something you enjoy. For example, listen to music or go to a movie. Practice your hobby or do volunteer  "work.  Meditate. This can help you relax, because you are not worrying about what happened before or what may happen in the future.  Do guided imagery. Imagine yourself in any setting that helps you feel calm. You can use online videos, books, or a teacher to guide you.  Do breathing exercises. For example:  From a standing position, bend forward from the waist with your knees slightly bent. Let your arms dangle close to the floor.  Breathe in slowly and deeply as you return to a standing position. Roll up slowly and lift your head last.  Hold your breath for just a few seconds in the standing position.  Breathe out slowly and bend forward from the waist.  Let your feelings out. Talk, laugh, cry, and express anger when you need to. Talking with supportive friends or family, a counselor, or a lois leader about your feelings is a healthy way to relieve stress. Avoid discussing your feelings with people who make you feel worse.  Write. It may help to write about things that are bothering you. This helps you find out how much stress you feel and what is causing it. When you know this, you can find better ways to cope.  What can you do to prevent stress?  You might try some of these things to help prevent stress:  Manage your time. This helps you find time to do the things you want and need to do.  Get enough sleep. Your body recovers from the stresses of the day while you are sleeping.  Get support. Your family, friends, and community can make a difference in how you experience stress.  Limit your news feed. Avoid or limit time on social media or news that may make you feel stressed.  Do something active. Exercise or activity can help reduce stress. Walking is a great way to get started.  Where can you learn more?  Go to https://www.[employer].net/patiented  Enter N032 in the search box to learn more about \"Learning About Stress.\"  Current as of: October 24, 2024  Content Version: 14.4 2024-2025 [employer].   Care instructions adapted under license by your healthcare professional. If you have questions about a medical condition or this instruction, always ask your healthcare professional. [employer] disclaims any warranty or liability for your use of this information.    Learning About Depression Screening  What is depression screening?  Depression screening is a way to see if you have depression symptoms. It may be done by a doctor or counselor. It's often part of a routine checkup. That's because your mental health is just as important as your physical health.  Depression is a mental health condition that affects how you feel, think, and act. You may:  Have less energy.  Lose interest in your daily activities.  Feel sad and grouchy for a long time.  Depression is very common. It affects people of all ages.  Many things can lead to depression. Some people become depressed after they have a stroke or find out they have a major illness like cancer or heart disease. The death of a loved one or a breakup may lead to depression. It can run in families. Most experts believe that a combination of inherited genes and stressful life events can cause it.  What happens during screening?  You may be asked to fill out a form about your depression symptoms. You and the doctor will discuss your answers. The doctor may ask you more questions to learn more about how you think, act, and feel.  What happens after screening?  If you have symptoms of depression, your doctor will talk to you about your options.  Doctors usually treat depression with medicines or counseling. Often, combining the two works best. Many people don't get help because they think that they'll get over the depression on their own. But people with depression may not get better unless they get treatment.  The cause of depression is not well understood. There may be many factors involved. But if you have depression, it's not your fault.  A serious  "symptom of depression is thinking about death or suicide. If you or someone you care about talks about this or about feeling hopeless, get help right away.  It's important to know that depression can be treated. Medicine, counseling, and self-care may help.  Where can you learn more?  Go to https://www.[employer].net/patiented  Enter T185 in the search box to learn more about \"Learning About Depression Screening.\"  Current as of: July 31, 2024  Content Version: 14.4    4743-1114 [employer].   Care instructions adapted under license by your healthcare professional. If you have questions about a medical condition or this instruction, always ask your healthcare professional. [employer] disclaims any warranty or liability for your use of this information.       "

## 2025-04-08 NOTE — PROGRESS NOTES
"Preventive Care Visit  [facility]  Apr 8, 2025      Assessment & Plan       ICD-10-CM    1. Routine general medical examination at a health care facility  Z00.00       2. Benign essential hypertension  I10 Albumin Random Urine Quantitative with Creat Ratio     lisinopril-hydrochlorothiazide (ZESTORETIC) 20-25 MG tablet     Comprehensive metabolic panel     Hemoglobin     Albumin Random Urine Quantitative with Creat Ratio     Comprehensive metabolic panel     Hemoglobin      3. General counseling for prescription of oral contraceptives  Z30.09 norethindrone (MICRONOR) 0.35 MG tablet      4. Current moderate episode of major depressive disorder, unspecified whether recurrent (H)  F32.1       5. Melasma  L81.1 Adult Dermatology  Referral      6. Body mass index (BMI) 40.0-44.9, adult (H)  Z68.41 Adult Comprehensive Weight Management  Referral     Hemoglobin A1c     Hemoglobin A1c      7. Screen for STD (sexually transmitted disease)  Z11.3 Treponema Abs w Reflex to RPR and Titer     Chlamydia trachomatis/Neisseria gonorrhoeae by PCR     HIV Antigen Antibody Combo Cascade     Treponema Abs w Reflex to RPR and Titer     Chlamydia trachomatis/Neisseria gonorrhoeae by PCR     HIV Antigen Antibody Combo Cascade      8. CARDIOVASCULAR SCREENING; LDL GOAL LESS THAN 160  Z13.6 Lipid panel reflex to direct LDL Fasting     Lipid panel reflex to direct LDL Fasting           Patient has been advised of split billing requirements and indicates understanding: Yes        BMI  Estimated body mass index is 41.48 kg/m  as calculated from the following:    Height as of this encounter: 1.676 m (5' 6\").    Weight as of this encounter: 116.6 kg (257 lb).   Weight management plan: Patient referred to endocrine and/or weight management specialty    Counseling  Appropriate preventive services were addressed with this patient via screening, questionnaire, or " discussion as appropriate for fall prevention, nutrition, physical activity, Tobacco-use cessation, social engagement, weight loss and cognition.  Checklist reviewing preventive services available has been given to the patient.  Reviewed patient's diet, addressing concerns and/or questions.   She is at risk for lack of exercise and has been provided with information to increase physical activity for the benefit of her well-being.   She is at risk for psychosocial distress and has been provided with information to reduce risk.   The patient's PHQ-9 score is consistent with mild depression. She was provided with information regarding depression.       See Patient Instructions    Los Calixto is a 40 year old, presenting for the following:  Physical        4/8/2025    10:45 AM   Additional Questions   Roomed by Sarai HERRON   Accompanied by n/a          HPI         Advance Care Planning  Patient does not have a Health Care Directive: Discussed advance care planning with patient; however, patient declined at this time.      4/8/2025   General Health   How would you rate your overall physical health? (!) FAIR   Feel stress (tense, anxious, or unable to sleep) To some extent   (!) STRESS CONCERN      4/8/2025   Nutrition   Three or more servings of calcium each day? Yes   Diet: Carbohydrate counting   How many servings of fruit and vegetables per day? (!) 2-3   How many sweetened beverages each day? 0-1         4/8/2025   Exercise   Days per week of moderate/strenous exercise 3 days   Average minutes spent exercising at this level 40 min         4/8/2025   Social Factors   Frequency of gathering with friends or relatives Once a week   Worry food won't last until get money to buy more No   Food not last or not have enough money for food? No   Do you have housing? (Housing is defined as stable permanent housing and does not include staying ouside in a car, in a tent, in an abandoned building, in an overnight shelter, or  couch-surfing.) Yes   Are you worried about losing your housing? No   Lack of transportation? No   Unable to get utilities (heat,electricity)? No         4/8/2025   Dental   Dentist two times every year? Yes           3/29/2024   TB Screening   Were you born outside of the [place]? Yes         Today's PHQ-9 Score:       4/8/2025     9:32 AM   PHQ-9 SCORE   PHQ-9 Total Score MyChart 5 (Mild depression)   PHQ-9 Total Score 5        Patient-reported         4/8/2025   Substance Use   Alcohol more than 3/day or more than 7/wk No   Do you use any other substances recreationally? No     Social History     Tobacco Use     Smoking status: Never     Passive exposure: Never     Smokeless tobacco: Never   Vaping Use     Vaping status: Never Used   Substance Use Topics     Alcohol use: Yes     Comment: 3/ week     Drug use: Never           6/28/2024   LAST FHS-7 RESULTS   1st degree relative breast or ovarian cancer No   Any relative bilateral breast cancer No   Any male have breast cancer No   Any ONE woman have BOTH breast AND ovarian cancer No   Any woman with breast cancer before 50yrs No   2 or more relatives with breast AND/OR ovarian cancer No   2 or more relatives with breast AND/OR bowel cancer No        Mammogram Screening - Mammogram every 1-2 years updated in Health Maintenance based on mutual decision making        4/8/2025   STI Screening   New sexual partner(s) since last STI/HIV test? No     History of abnormal Pap smear: No - age 30- 64 PAP with HPV every 5 years recommended        Latest Ref Rng & Units 3/16/2021     5:21 PM 3/16/2021     5:02 PM   PAP / HPV   PAP (Historical)   NIL    HPV 16 DNA NEG^Negative Negative     HPV 18 DNA NEG^Negative Negative     Other HR HPV NEG^Negative Negative       ASCVD Risk   The 10-year ASCVD risk score (Maria De Jesus URBINA, et al., 2019) is: 0.9%    Values used to calculate the score:      Age: 40 years      Sex: Female      Is Non- : No      Diabetic:  No      Tobacco smoker: No      Systolic Blood Pressure: 129 mmHg      Is BP treated: Yes      HDL Cholesterol: 36 mg/dL      Total Cholesterol: 140 mg/dL        2025   Contraception/Family Planning   Questions about contraception or family planning (!) YES          Reviewed and updated as needed this visit by Provider   Tobacco  Allergies  Meds  Problems  Med Hx  Surg Hx  Fam Hx            Past Medical History:   Diagnosis Date     Current moderate episode of major depressive disorder, unspecified whether recurrent (H) 3/20/2023     Hypertension      Past Surgical History:   Procedure Laterality Date     LAPAROSCOPIC GASTRIC SLEEVE  2015    [facility]     WISDOM TOOTH EXTRACTION       Labs reviewed in EPIC  BP Readings from Last 3 Encounters:   25 129/87   24 109/77   24 137/88    Wt Readings from Last 3 Encounters:   25 116.6 kg (257 lb)   24 115.7 kg (255 lb)   24 117.9 kg (260 lb)                  Patient Active Problem List   Diagnosis     Panic attack     Generalized anxiety disorder     Morbid obesity (H)     History of thyroid nodule     History of sleeve gastrectomy     Current moderate episode of major depressive disorder, unspecified whether recurrent (H)     PCOS (polycystic ovarian syndrome)     Patellofemoral arthralgia of left knee     Acute strain of neck muscle, initial encounter     Ulnar neuritis, left     Ulnar neuritis, right     Past Surgical History:   Procedure Laterality Date     LAPAROSCOPIC GASTRIC SLEEVE  2015    [facility]     WISDOM TOOTH EXTRACTION         Social History     Tobacco Use     Smoking status: Never     Passive exposure: Never     Smokeless tobacco: Never   Substance Use Topics     Alcohol use: Yes     Comment: 3/ week     Family History   Problem Relation Age of Onset     Uterine Cancer Mother      Thyroid Disease Mother      Other Cancer Mother      Diabetes Father              Hypertension Father      Asthma Father       Schizophrenia Sister      Depression Sister      Anxiety Disorder Sister      Mental Illness Sister      Cerebrovascular Disease Maternal Grandmother      Cerebrovascular Disease Maternal Grandfather      Breast Cancer No family hx of      Ovarian Cancer No family hx of      Colorectal Cancer No family hx of          Current Outpatient Medications   Medication Sig Dispense Refill     lisinopril-hydrochlorothiazide (ZESTORETIC) 20-25 MG tablet Take 1 tablet by mouth daily. 90 tablet 1     Multiple Vitamins-Minerals (HAIR SKIN AND NAILS FORMULA) TABS Take by mouth.       norethindrone (MICRONOR) 0.35 MG tablet Take 1 tablet (0.35 mg) by mouth daily. 84 tablet 4     acetaminophen (TYLENOL) 325 MG tablet Take 1 tablet (325 mg) by mouth every 6 hours as needed for pain. 2 Tablets every 6-8 hours as needed for pain 56 tablet 0     ibuprofen (ADVIL/MOTRIN) 800 MG tablet Take 1 tablet (800 mg) by mouth every 8 hours as needed for other (Cramping). Take with food 21 tablet 0     propranolol (INDERAL) 10 MG tablet Take 1 tablet (10 mg) by mouth 3 times daily as needed (anxiety panic attack) 270 tablet 0     Allergies   Allergen Reactions     Seasonal Allergies      Recent Labs   Lab Test 03/29/24  1122 07/21/23  1428 03/20/23  1622 02/16/22  1457 02/16/22  1457 11/06/20  1019   A1C 5.2  --   --   --  5.0  --    LDL 97  --  110*  --  109* 125*   HDL 36*  --  32*  --  34* 38*   TRIG 33  --  38  --  38 30   ALT 12  --  13  --  21 20   CR 0.81 0.81 0.80   < > 0.89 0.67   GFRESTIMATED >90 >90 >90   < > 85 >90   GFRESTBLACK  --   --   --   --   --  >90   POTASSIUM 4.2 4.4 4.6   < > 4.1 4.1   TSH 0.92  --   --   --  1.33 2.11    < > = values in this interval not displayed.          Review of Systems  Constitutional, neuro, ENT, endocrine, pulmonary, cardiac, gastrointestinal, genitourinary, musculoskeletal, integument and psychiatric systems are negative, except as otherwise noted.     Objective    Exam  /87 (BP Location:  "Left arm, Patient Position: Sitting, Cuff Size: Adult Large)   Pulse 61   Temp 96.9  F (36.1  C) (Temporal)   Resp 16   Ht 1.676 m (5' 6\")   Wt 116.6 kg (257 lb)   LMP 04/08/2025 (Exact Date)   SpO2 100%   BMI 41.48 kg/m     Estimated body mass index is 41.48 kg/m  as calculated from the following:    Height as of this encounter: 1.676 m (5' 6\").    Weight as of this encounter: 116.6 kg (257 lb).    Physical Exam  GENERAL: alert and no distress  EYES: Eyes grossly normal to inspection, PERRL and conjunctivae and sclerae normal  HENT: ear canals and TM's normal, nose and mouth without ulcers or lesions  NECK: no adenopathy, no asymmetry, masses, or scars  RESP: lungs clear to auscultation - no rales, rhonchi or wheezes  CV: regular rate and rhythm, normal S1 S2, no S3 or S4, no murmur, click or rub, no peripheral edema  MS: no gross musculoskeletal defects noted, no edema  SKIN: no suspicious lesions or rashes  NEURO: Normal strength and tone, mentation intact and speech normal  PSYCH: mentation appears normal, affect normal/[id number]        Signed Electronically by: Luís Garcia PA-C    Answers submitted by the patient for this visit:  Patient Health Questionnaire (Submitted on 4/8/2025)  If you checked off any problems, how difficult have these problems made it for you to do your work, take care of things at home, or get along with other people?: Somewhat difficult  PHQ9 TOTAL SCORE: 5  Patient Health Questionnaire (G7) (Submitted on 4/8/2025)  DAYNE 7 TOTAL SCORE: 4    "

## 2025-04-09 LAB — T PALLIDUM AB SER QL: NONREACTIVE

## 2025-04-10 NOTE — RESULT ENCOUNTER NOTE
"Paulina Calixto  Your attached labs are within normal limits with negative STD testing.  Please contact the office with any questions or concerns.    Melisa Zavaleta \"Luís\" CECI Garcia"

## 2025-04-29 PROBLEM — S16.1XXA ACUTE STRAIN OF NECK MUSCLE, INITIAL ENCOUNTER: Status: RESOLVED | Noted: 2024-09-20 | Resolved: 2025-04-29

## 2025-04-29 PROBLEM — G56.21 ULNAR NEURITIS, RIGHT: Status: RESOLVED | Noted: 2024-09-20 | Resolved: 2025-04-29

## 2025-04-29 PROBLEM — G56.22 ULNAR NEURITIS, LEFT: Status: RESOLVED | Noted: 2024-09-20 | Resolved: 2025-04-29

## 2025-06-06 ENCOUNTER — MYC MEDICAL ADVICE (OUTPATIENT)
Dept: FAMILY MEDICINE | Facility: CLINIC | Age: 40
End: 2025-06-06
Payer: COMMERCIAL

## 2025-06-06 DIAGNOSIS — Z11.3 SCREEN FOR STD (SEXUALLY TRANSMITTED DISEASE): ICD-10-CM

## 2025-06-07 ENCOUNTER — RESULTS FOLLOW-UP (OUTPATIENT)
Dept: URGENT CARE | Facility: URGENT CARE | Age: 40
End: 2025-06-07

## 2025-06-07 ENCOUNTER — OFFICE VISIT (OUTPATIENT)
Dept: URGENT CARE | Facility: URGENT CARE | Age: 40
End: 2025-06-07
Payer: COMMERCIAL

## 2025-06-07 VITALS
TEMPERATURE: 99.1 F | HEART RATE: 81 BPM | SYSTOLIC BLOOD PRESSURE: 134 MMHG | HEIGHT: 66 IN | OXYGEN SATURATION: 98 % | RESPIRATION RATE: 16 BRPM | BODY MASS INDEX: 41.95 KG/M2 | WEIGHT: 261 LBS | DIASTOLIC BLOOD PRESSURE: 85 MMHG

## 2025-06-07 DIAGNOSIS — R10.9 FLANK PAIN: Primary | ICD-10-CM

## 2025-06-07 DIAGNOSIS — N39.0 E-COLI UTI: Primary | ICD-10-CM

## 2025-06-07 DIAGNOSIS — B96.20 E-COLI UTI: Primary | ICD-10-CM

## 2025-06-07 DIAGNOSIS — Z11.3 SCREEN FOR STD (SEXUALLY TRANSMITTED DISEASE): ICD-10-CM

## 2025-06-07 LAB
ALBUMIN UR-MCNC: NEGATIVE MG/DL
APPEARANCE UR: ABNORMAL
BACTERIA #/AREA URNS HPF: ABNORMAL /HPF
BILIRUB UR QL STRIP: NEGATIVE
COLOR UR AUTO: YELLOW
GLUCOSE UR STRIP-MCNC: NEGATIVE MG/DL
HGB UR QL STRIP: ABNORMAL
KETONES UR STRIP-MCNC: NEGATIVE MG/DL
LEUKOCYTE ESTERASE UR QL STRIP: ABNORMAL
NITRATE UR QL: NEGATIVE
PH UR STRIP: 5.5 [PH] (ref 5–7)
RBC #/AREA URNS AUTO: ABNORMAL /HPF
SP GR UR STRIP: 1.02 (ref 1–1.03)
UROBILINOGEN UR STRIP-ACNC: 0.2 E.U./DL
WBC #/AREA URNS AUTO: ABNORMAL /HPF
WBC CLUMPS #/AREA URNS HPF: PRESENT /HPF

## 2025-06-07 PROCEDURE — 87591 N.GONORRHOEAE DNA AMP PROB: CPT | Performed by: PHYSICIAN ASSISTANT

## 2025-06-07 PROCEDURE — 3075F SYST BP GE 130 - 139MM HG: CPT | Performed by: PHYSICIAN ASSISTANT

## 2025-06-07 PROCEDURE — 87086 URINE CULTURE/COLONY COUNT: CPT | Performed by: PHYSICIAN ASSISTANT

## 2025-06-07 PROCEDURE — 99214 OFFICE O/P EST MOD 30 MIN: CPT | Performed by: PHYSICIAN ASSISTANT

## 2025-06-07 PROCEDURE — 87491 CHLMYD TRACH DNA AMP PROBE: CPT | Performed by: PHYSICIAN ASSISTANT

## 2025-06-07 PROCEDURE — 87186 SC STD MICRODIL/AGAR DIL: CPT | Performed by: PHYSICIAN ASSISTANT

## 2025-06-07 PROCEDURE — 81001 URINALYSIS AUTO W/SCOPE: CPT | Performed by: PHYSICIAN ASSISTANT

## 2025-06-07 PROCEDURE — 3079F DIAST BP 80-89 MM HG: CPT | Performed by: PHYSICIAN ASSISTANT

## 2025-06-07 ASSESSMENT — ENCOUNTER SYMPTOMS
NAUSEA: 1
FEVER: 0
CHILLS: 0
FLANK PAIN: 1
VOMITING: 0
FREQUENCY: 1
DYSURIA: 0
HEMATURIA: 0
ABDOMINAL PAIN: 0

## 2025-06-07 NOTE — PROGRESS NOTES
Assessment & Plan:        ICD-10-CM    1. Flank pain  R10.9 UA Macroscopic with reflex to Microscopic and Culture - Clinic Collect     UA Microscopic with Reflex to Culture     Urine Culture      2. Screen for STD (sexually transmitted disease)  Z11.3 Chlamydia & Gonorrhea by PCR, [facility] - Clinic Collect            Plan/Clinical Decision Making:    Patient presents with cloudy urine and left flank pain. Pain in left lower back. Pain mild with no CVA tenderness. Currently on period.   UA macro- cloudy, moderate blood, small leukocytes.  Micro: WBC 25-50. Patient did dirty catch. With minimal urinary symptoms and no CVA tenderness will wait to treat until UA done. Unsure of exact etiology of flank pain. Possibly musculoskeletal, no pain with ROM. Consider repeat UA and possible imaging if worsening flank pain.     Patient Instructions   We will wait to treat until the urine culture returns. This will show if you have a bacteria and so what the best antibiotic is to use. This can take two days.     Take Tylenol, ibuprofen for flank pain. If this is not improving in 2-3 days or more severe pain, we ideally get a urine test when you are not having menstrual bleeding. If you have severe pain we can refer you for imaging.       Return if symptoms worsen or fail to improve, for in 3-5 days.     At the end of the encounter, I discussed results, diagnosis, medications. Discussed red flags for immediate return to clinic/ER, as well as indications for follow up if no improvement. Patient understood and agreed to plan. Patient was stable for discharge.        Maria Esther Tineo PA-C on 6/7/2025 at 4:48 PM          Subjective:     HPI:    Durga is a 40 year old female who presents to clinic today for the following health issues:  Chief Complaint   Patient presents with    Urgent Care    Urinary Problem     Cloudy urine     Flank Pain     Pain, worsened today      HPI    Cloudy urine, left flank pain.     Review of Systems  "  Constitutional:  Negative for chills and fever.   Gastrointestinal:  Positive for nausea (mild). Negative for abdominal pain and vomiting.   Genitourinary:  Positive for flank pain (left), frequency and vaginal bleeding (on period.). Negative for dysuria, hematuria, vaginal discharge and vaginal pain.         Patient Active Problem List   Diagnosis    Panic attack    Generalized anxiety disorder    Morbid obesity (H)    History of thyroid nodule    History of sleeve gastrectomy    Current moderate episode of major depressive disorder, unspecified whether recurrent (H)    PCOS (polycystic ovarian syndrome)    Patellofemoral arthralgia of left knee        Past Medical History:   Diagnosis Date    Current moderate episode of major depressive disorder, unspecified whether recurrent (H) 3/20/2023    Hypertension        Social History     Tobacco Use    Smoking status: Never     Passive exposure: Never    Smokeless tobacco: Never   Substance Use Topics    Alcohol use: Yes     Comment: 3/ week             Objective:     Vitals:    06/07/25 1635   BP: 134/85   Pulse: 81   Resp: 16   Temp: 99.1  F (37.3  C)   TempSrc: Temporal   SpO2: 98%   Weight: 118.4 kg (261 lb)   Height: 1.676 m (5' 6\")         Physical Exam  Vitals and nursing note reviewed.   Constitutional:       General: She is not in acute distress.     Appearance: She is not ill-appearing.   HENT:      Head: Normocephalic and atraumatic.      Right Ear: External ear normal.      Left Ear: External ear normal.   Eyes:      Conjunctiva/sclera: Conjunctivae normal.   Abdominal:      General: Abdomen is flat. There is no distension.      Tenderness: There is no abdominal tenderness. There is no right CVA tenderness, left CVA tenderness or guarding.   Neurological:      Mental Status: She is alert.   Psychiatric:         Mood and Affect: Mood normal.         Behavior: Behavior normal.         Thought Content: Thought content normal.         Judgment: Judgment normal. "            Results:  Results for orders placed or performed in visit on 06/07/25   UA Macroscopic with reflex to Microscopic and Culture - Clinic Collect     Status: Abnormal    Specimen: Urine, Midstream   Result Value Ref Range    Color Urine Yellow Colorless, Straw, Light Yellow, Yellow    Appearance Urine Slightly Cloudy (A) Clear    Glucose Urine Negative Negative mg/dL    Bilirubin Urine Negative Negative    Ketones Urine Negative Negative mg/dL    Specific Gravity Urine 1.020 1.003 - 1.035    Blood Urine Moderate (A) Negative    pH Urine 5.5 5.0 - 7.0    Protein Albumin Urine Negative Negative mg/dL    Urobilinogen Urine 0.2 0.2, 1.0 E.U./dL    Nitrite Urine Negative Negative    Leukocyte Esterase Urine Small (A) Negative   UA Microscopic with Reflex to Culture     Status: Abnormal   Result Value Ref Range    Bacteria Urine Many (A) None Seen /HPF    RBC Urine None Seen 0-2 /HPF /HPF    WBC Urine 25-50 (A) 0-5 /HPF /HPF    WBC Clumps Urine Present (A) None Seen /HPF

## 2025-06-07 NOTE — PATIENT INSTRUCTIONS
We will wait to treat until the urine culture returns. This will show if you have a bacteria and so what the best antibiotic is to use. This can take two days.     Take Tylenol, ibuprofen for flank pain. If this is not improving in 2-3 days or more severe pain, we ideally get a urine test when you are not having menstrual bleeding. If you have severe pain we can refer you for imaging.

## 2025-06-07 NOTE — PROGRESS NOTES
Urgent Care Clinic Visit  Chief Complaint   Patient presents with    Urgent Care    Urinary Problem     Cloudy urine     Flank Pain     Pain, worsened today                6/7/2025     4:34 PM   Additional Questions   Roomed by aleksandr knight     Pre-Provider Visit Orders- Urinalysis UA/UC  Patient reports the following symptoms:  cloudy urine   Does the patient report any of the following symptoms: vaginal discharge, vaginal itching, possible yeast infection, has a urinary catheter in place, or unable to void in a specimen cup?  No

## 2025-06-08 LAB
C TRACH DNA SPEC QL PROBE+SIG AMP: NEGATIVE
N GONORRHOEA DNA SPEC QL NAA+PROBE: NEGATIVE
SPECIMEN TYPE: NORMAL

## 2025-06-10 LAB — BACTERIA UR CULT: ABNORMAL

## 2025-06-10 RX ORDER — CEPHALEXIN 500 MG/1
500 CAPSULE ORAL 2 TIMES DAILY
Qty: 10 CAPSULE | Refills: 0 | Status: SHIPPED | OUTPATIENT
Start: 2025-06-10 | End: 2025-06-15

## 2025-06-24 ENCOUNTER — OFFICE VISIT (OUTPATIENT)
Dept: URGENT CARE | Facility: URGENT CARE | Age: 40
End: 2025-06-24
Payer: COMMERCIAL

## 2025-06-24 VITALS
TEMPERATURE: 99.3 F | DIASTOLIC BLOOD PRESSURE: 86 MMHG | HEIGHT: 66 IN | WEIGHT: 261 LBS | RESPIRATION RATE: 18 BRPM | SYSTOLIC BLOOD PRESSURE: 132 MMHG | HEART RATE: 85 BPM | BODY MASS INDEX: 41.95 KG/M2

## 2025-06-24 DIAGNOSIS — N89.8 VAGINAL ITCHING: Primary | ICD-10-CM

## 2025-06-24 LAB
ALBUMIN UR-MCNC: NEGATIVE MG/DL
APPEARANCE UR: CLEAR
BILIRUB UR QL STRIP: NEGATIVE
CLUE CELLS: PRESENT
COLOR UR AUTO: YELLOW
GLUCOSE UR STRIP-MCNC: NEGATIVE MG/DL
HGB UR QL STRIP: NEGATIVE
KETONES UR STRIP-MCNC: NEGATIVE MG/DL
LEUKOCYTE ESTERASE UR QL STRIP: NEGATIVE
NITRATE UR QL: NEGATIVE
PH UR STRIP: 7 [PH] (ref 5–7)
SP GR UR STRIP: 1.01 (ref 1–1.03)
TRICHOMONAS, WET PREP: ABNORMAL
UROBILINOGEN UR STRIP-ACNC: 0.2 E.U./DL
WBC'S/HIGH POWER FIELD, WET PREP: ABNORMAL
YEAST, WET PREP: ABNORMAL

## 2025-06-24 PROCEDURE — 3075F SYST BP GE 130 - 139MM HG: CPT | Performed by: PHYSICIAN ASSISTANT

## 2025-06-24 PROCEDURE — 1125F AMNT PAIN NOTED PAIN PRSNT: CPT | Performed by: PHYSICIAN ASSISTANT

## 2025-06-24 PROCEDURE — 81003 URINALYSIS AUTO W/O SCOPE: CPT | Performed by: PHYSICIAN ASSISTANT

## 2025-06-24 PROCEDURE — 3079F DIAST BP 80-89 MM HG: CPT | Performed by: PHYSICIAN ASSISTANT

## 2025-06-24 PROCEDURE — 87591 N.GONORRHOEAE DNA AMP PROB: CPT | Performed by: PHYSICIAN ASSISTANT

## 2025-06-24 PROCEDURE — 87491 CHLMYD TRACH DNA AMP PROBE: CPT | Performed by: PHYSICIAN ASSISTANT

## 2025-06-24 PROCEDURE — 99214 OFFICE O/P EST MOD 30 MIN: CPT | Performed by: PHYSICIAN ASSISTANT

## 2025-06-24 PROCEDURE — 87210 SMEAR WET MOUNT SALINE/INK: CPT | Performed by: PHYSICIAN ASSISTANT

## 2025-06-24 ASSESSMENT — PAIN SCALES - GENERAL: PAINLEVEL_OUTOF10: MODERATE PAIN (5)

## 2025-06-24 NOTE — PROGRESS NOTES
"Chief Complaint   Patient presents with    Urgent Care    Vaginal Itching     Pt in clinic to have eval for vaginal itching, fatigue throat pain, change in tongue  color and white vaginal  discharge.       Assessment & Plan  Assessment  - Presence of clue cells suggests possible bacterial vaginosis (BV), but absence of typical symptoms such as discharge and odor makes the diagnosis uncertain.  - No evidence of oral thrush based on throat examination.  - Potential influence of a new sexual partner on vaginal frank, contributing to perceived symptoms.  - Watchful waiting approach recommended due to lack of definitive symptoms or test results indicating a need for immediate treatment.    Plan  - Watchful waiting for vaginal symptoms for another 1-2 weeks. If symptoms resolve, no further treatment is needed.  - Follow-up if symptoms persist, either by returning to the emergency department or with an OBGYN.      ICD-10-CM    1. Vaginal itching  N89.8 UA Macroscopic with reflex to Microscopic and Culture - Clinic Collect     Wet prep - Clinic Collect     Chlamydia & Gonorrhea by PCR, [facility] - Clinic Collect          SUBJECTIVE:  History of Present Illness-Durga Flannery, a 40-year-old female, vaginal discomfort.  She was treated for UTI about 3 weeks ago.  Her symptoms seem to improve however she started experiencing some vaginal discomfort.  She reports a change in vaginal sensation but denies any significant vaginal discharge or itching. She was tested for chlamydia and gonorrhea previous visit which were negative  has had one new sexual partner 2 weeks ago. She recalls a past episode of bacterial vaginosis a few years ago but does not currently experience the typical symptoms associated with it, such as odor.     ROS: Pertinent ROS neg other than the symptoms noted above in the HPI.     OBJECTIVE:  /86   Pulse 85   Temp 99.3  F (37.4  C) (Temporal)   Resp 18   Ht 1.67 m (5' 5.75\")   Wt 118.4 kg (261 lb)  "  LMP 06/03/2025   BMI 42.45 kg/m     Physical Exam  - HEENT: Throat examination unremarkable; tongue examination shows no evidence of oral candidiasis.    GENERAL: alert and no distress  EYES: Eyes grossly normal to inspection, PERRL and conjunctivae and sclerae normal  MS: no gross musculoskeletal defects noted, no edema  SKIN: no suspicious lesions or rashes    DIAGNOSTICS    Results  - Clue cells present  - No yeast detected  Results for orders placed or performed in visit on 06/24/25   UA Macroscopic with reflex to Microscopic and Culture - Clinic Collect     Status: Normal    Specimen: Urine, Clean Catch   Result Value Ref Range    Color Urine Yellow Colorless, Straw, Light Yellow, Yellow    Appearance Urine Clear Clear    Glucose Urine Negative Negative mg/dL    Bilirubin Urine Negative Negative    Ketones Urine Negative Negative mg/dL    Specific Gravity Urine 1.015 1.003 - 1.035    Blood Urine Negative Negative    pH Urine 7.0 5.0 - 7.0    Protein Albumin Urine Negative Negative mg/dL    Urobilinogen Urine 0.2 0.2, 1.0 E.U./dL    Nitrite Urine Negative Negative    Leukocyte Esterase Urine Negative Negative    Narrative    Microscopic not indicated   Wet prep - Clinic Collect     Status: Abnormal    Specimen: Vagina; Swab   Result Value Ref Range    Trichomonas Absent Absent    Yeast Absent Absent    Clue Cells Present (A) Absent    WBCs/high power field 1+ (A) None        Leighton Ellis PA-C    Consent was obtained from the patient to use an AI documentation tool in the creation of this note.  Any grammatical or spelling errors are non-intentional. Please contact the author of this note directly if you are in need of any clarification.     Current Outpatient Medications   Medication Sig Dispense Refill    acetaminophen (TYLENOL) 325 MG tablet Take 1 tablet (325 mg) by mouth every 6 hours as needed for pain. 2 Tablets every 6-8 hours as needed for pain 56 tablet 0    ibuprofen (ADVIL/MOTRIN) 800 MG tablet  Take 1 tablet (800 mg) by mouth every 8 hours as needed for other (Cramping). Take with food 21 tablet 0    lisinopril-hydrochlorothiazide (ZESTORETIC) 20-25 MG tablet Take 1 tablet by mouth daily. 90 tablet 1    Multiple Vitamins-Minerals (HAIR SKIN AND NAILS FORMULA) TABS Take by mouth.      norethindrone (MICRONOR) 0.35 MG tablet Take 1 tablet (0.35 mg) by mouth daily. 84 tablet 4    propranolol (INDERAL) 10 MG tablet Take 1 tablet (10 mg) by mouth 3 times daily as needed (anxiety panic attack) 270 tablet 0     No current facility-administered medications for this visit.      Patient Active Problem List   Diagnosis    Panic attack    Generalized anxiety disorder    Morbid obesity (H)    History of thyroid nodule    History of sleeve gastrectomy    Current moderate episode of major depressive disorder, unspecified whether recurrent (H)    PCOS (polycystic ovarian syndrome)    Patellofemoral arthralgia of left knee      Past Medical History:   Diagnosis Date    Current moderate episode of major depressive disorder, unspecified whether recurrent (H) 3/20/2023    Hypertension      Past Surgical History:   Procedure Laterality Date    LAPAROSCOPIC GASTRIC SLEEVE  2015    [facility]    WISDOM TOOTH EXTRACTION       Family History   Problem Relation Age of Onset    Uterine Cancer Mother     Thyroid Disease Mother     Other Cancer Mother     Diabetes Father             Hypertension Father     Asthma Father     Schizophrenia Sister     Depression Sister     Anxiety Disorder Sister     Mental Illness Sister     Cerebrovascular Disease Maternal Grandmother     Cerebrovascular Disease Maternal Grandfather     Breast Cancer No family hx of     Ovarian Cancer No family hx of     Colorectal Cancer No family hx of      Social History     Tobacco Use    Smoking status: Never     Passive exposure: Never    Smokeless tobacco: Never   Substance Use Topics    Alcohol use: Yes     Comment: 3/ week

## 2025-06-26 ENCOUNTER — LAB (OUTPATIENT)
Dept: LAB | Facility: CLINIC | Age: 40
End: 2025-06-26
Payer: COMMERCIAL

## 2025-06-26 DIAGNOSIS — Z11.3 SCREEN FOR STD (SEXUALLY TRANSMITTED DISEASE): ICD-10-CM

## 2025-06-26 LAB — T PALLIDUM AB SER QL: NONREACTIVE

## 2025-06-27 ENCOUNTER — RESULTS FOLLOW-UP (OUTPATIENT)
Dept: FAMILY MEDICINE | Facility: CLINIC | Age: 40
End: 2025-06-27

## 2025-07-02 NOTE — PROGRESS NOTES
PHYSICAL THERAPY EVALUATION  Type of Visit: Evaluation       Fall Risk Screen:  Have you fallen 2 or more times in the past year?: No  Have you fallen and had an injury in the past year?: Yes  Is patient receiving Physical Therapy Services?: Yes    Subjective         Presenting condition or subjective complaint: Intermittent neck and lower base head aches or tightness  Date of onset: 23 (MD soto)    Relevant medical history:     Dates & types of surgery:      Prior diagnostic imaging/testing results: X-ray     Prior therapy history for the same diagnosis, illness or injury: Yes PT    Prior Level of Function  Transfers: Independent  Ambulation: Independent  ADL: Independent  IADL: Driving, Finances, Housekeeping, Laundry, Meal preparation, Work    Living Environment  Social support: Alone   Type of home: Apartment/condo   Stairs to enter the home: Yes 50 Is there a railing: Yes     Ramp: No   Stairs inside the home: No       Help at home: None  Equipment owned:       Employment: Yes Research   Hobbies/Interests: Gym, cycling, [employer]    Patient goals for therapy:      Pain assessment: Location: bilateral cervical spine C4 and proximal toward occpital protuberance /Ratin-6/10     Objective   CERVICAL SPINE EVALUATION  PAIN: Pain is Exacerbated By: sitting, ju jitsu   Pain is Relieved By: rest and stretch    POSTURE: Sitting Posture: Rounded shoulders    ROM:   (Degrees) Left AROM Right AROM    Cervical Flexion Nil loss + stretch     Cervical Extension Min loss + restriction anterior neck     Cervical Side bend Nil loss Nil loss    Cervical Rotation Nil loss Nil loss    Cervical Protrusion Nil loss    Cervical Retraction Min loss    Thoracic Flexion Nil loss    Thoracic Extension Min loss    Thoracic Rotation Nil loss Nil loss    Pain:   End Feel:     MYOTOMES:    Left Right   C1-2 (Neck Flexion) 5 5   C3 (Neck Side Bend)  5 5   C4 (Shrug) 5 5   C5 (Deltoid) 5 5   C6 (Biceps) 5 5   C7 (Triceps) 5 5    C8 (Thumb Ext) 5 5   T1 (Intrinsics) 5 5     DTR S: WNL  CORD SIGNS: WNL  DERMATOMES: WNL  NEURAL TENSION: Lumbar WNL  FLEXIBILITY: Decreased upper trap and levator bilaterally      SPECIAL TESTS:    Left Right   Alar Ligament Negative    Cervical Flexion-Rotation Negative  Negative    Cervical Rot/Lateral Flex Negative  Negative    Compression Negative  Negative    Distraction Negative  Negative    Spurling s Negative  Negative    Thoracic Outlet Screen (Ag, Adson) Negative  Negative    Transverse Ligament Negative  Negative    Vertebral Artery Negative  Negative      PALPATION: TTP along cervical parasinals, levator, upper trap, scalenes   SPINAL SEGMENTAL CONCLUSIONS: stiffness C3-C5        Assessment & Plan   CLINICAL IMPRESSIONS  Medical Diagnosis: Acute strain of neck muscle, initial encounter, Ulnar neuritis, left, Ulnar neuritis, right    Treatment Diagnosis: Acute strain of neck muscle, initial encounter, Ulnar neuritis, left, Ulnar neuritis, right   Impression/Assessment: Patient is a 40 year old female with neck pain complaints.  The following significant findings have been identified: Pain, Decreased ROM/flexibility, Decreased joint mobility, Decreased strength, Impaired balance, and Decreased proprioception. These impairments interfere with their ability to perform self care tasks, recreational activities, household chores, and community mobility as compared to previous level of function.     Clinical Decision Making (Complexity):  Clinical Presentation: Stable/Uncomplicated  Clinical Presentation Rationale: based on medical and personal factors listed in PT evaluation  Clinical Decision Making (Complexity): Low complexity    PLAN OF CARE  Treatment Interventions:  Modalities: Cryotherapy, Cupping, Dry Needling  Interventions: Gait Training, Manual Therapy, Neuromuscular Re-education, Therapeutic Activity, Therapeutic Exercise, Self-Care/Home Management    Long Term Goals     PT Goal 1  Goal  Identifier: Sitting  Goal Description: Patient will sit independently for 2 hours with less than 2/10 pain  Rationale: to maximize safety and independence with performance of ADLs and functional tasks;to maximize safety and independence within the community;to maximize safety and independence with self cares  Target Date: 09/25/25      Frequency of Treatment: 1x per week  Duration of Treatment: 6 weeks    Recommended Referrals to Other Professionals: none   Education Assessment:   Learner/Method: Patient;No Barriers to Learning    Risks and benefits of evaluation/treatment have been explained.   Patient/Family/caregiver agrees with Plan of Care.     Evaluation Time:     PT Eval, Low Complexity Minutes (37110): 20     Signing Clinician: Ceci Mishra PT

## 2025-07-03 ENCOUNTER — THERAPY VISIT (OUTPATIENT)
Dept: PHYSICAL THERAPY | Facility: CLINIC | Age: 40
End: 2025-07-03
Payer: COMMERCIAL

## 2025-07-03 DIAGNOSIS — M54.2 NECK PAIN: Primary | ICD-10-CM

## 2025-07-30 ENCOUNTER — MYC MEDICAL ADVICE (OUTPATIENT)
Dept: FAMILY MEDICINE | Facility: CLINIC | Age: 40
End: 2025-07-30
Payer: COMMERCIAL

## 2025-07-30 NOTE — TELEPHONE ENCOUNTER
Triage,   Please see message below and advise.   Requesting PT referral for neck strain.   Do see PT order from 9/14/24 for neck strain.  Last saw MP on 4/8/25.  Thanks!  Gale SOLO,

## 2025-07-31 ENCOUNTER — THERAPY VISIT (OUTPATIENT)
Dept: PHYSICAL THERAPY | Facility: CLINIC | Age: 40
End: 2025-07-31
Payer: COMMERCIAL

## 2025-07-31 DIAGNOSIS — M54.2 NECK PAIN: Primary | ICD-10-CM
